# Patient Record
Sex: FEMALE | Race: BLACK OR AFRICAN AMERICAN | NOT HISPANIC OR LATINO | Employment: OTHER | URBAN - METROPOLITAN AREA
[De-identification: names, ages, dates, MRNs, and addresses within clinical notes are randomized per-mention and may not be internally consistent; named-entity substitution may affect disease eponyms.]

---

## 2017-06-12 PROBLEM — M48.10 DISH (DIFFUSE IDIOPATHIC SKELETAL HYPEROSTOSIS): Status: ACTIVE | Noted: 2017-06-12

## 2017-07-09 PROBLEM — I10 HYPERTENSION: Status: ACTIVE | Noted: 2017-07-09

## 2017-07-09 PROBLEM — R16.0 LIVER MASS: Status: ACTIVE | Noted: 2017-07-09

## 2017-07-09 PROBLEM — N20.0 NEPHROLITHIASIS: Status: ACTIVE | Noted: 2017-07-09

## 2017-07-17 PROBLEM — N20.0 NEPHROLITHIASIS: Status: ACTIVE | Noted: 2017-07-17

## 2017-07-17 PROBLEM — R16.0 LIVER MASS: Status: ACTIVE | Noted: 2017-07-17

## 2017-07-25 PROBLEM — N10 ACUTE PYELONEPHRITIS: Status: ACTIVE | Noted: 2017-07-25

## 2017-07-25 PROBLEM — R11.2 NAUSEA AND VOMITING: Status: ACTIVE | Noted: 2017-07-25

## 2017-07-25 PROBLEM — E87.5 HYPERKALEMIA: Status: ACTIVE | Noted: 2017-07-25

## 2017-07-26 PROBLEM — R11.0 NAUSEA: Status: ACTIVE | Noted: 2017-07-26

## 2017-07-26 PROBLEM — C78.7 METASTATIC CANCER TO LIVER: Status: ACTIVE | Noted: 2017-07-26

## 2017-07-27 PROBLEM — R11.2 NAUSEA AND VOMITING: Status: RESOLVED | Noted: 2017-07-25 | Resolved: 2017-07-27

## 2017-07-28 PROBLEM — C78.7 METASTATIC CANCER TO LIVER: Status: ACTIVE | Noted: 2017-07-28

## 2017-07-29 ENCOUNTER — NURSE TRIAGE (OUTPATIENT)
Dept: ADMINISTRATIVE | Facility: CLINIC | Age: 67
End: 2017-07-29

## 2017-07-30 NOTE — TELEPHONE ENCOUNTER
"  Answer Assessment - Initial Assessment Questions  1. APPEARANCE of BLOOD: "What color is it?" "Is it passed separately, on the surface of the stool, or mixed in with the stool?"       Small spot on tissue   2. AMOUNT: "How much blood was passed?"       --  3. FREQUENCY: "How many times has blood been passed with the stools?"       Once   4. ONSET: "When was the blood first seen in the stools?" (Days or weeks)       Today   5. DIARRHEA: "Is there also some diarrhea?" If so, ask: "How many diarrhea stools were passed in past 24 hours?"       No   6. CONSTIPATION: "Do you have constipation?" If so, "How bad is it?"      Firm   7. RECURRENT SYMPTOMS: "Have you had blood in your stools before?" If so, ask: "When was the last time?" and "What happened that time?"       No   8. BLOOD THINNERS: "Do you take any blood thinners?" (e.g., Coumadin/warfarin, Pradaxa/dabigatran, aspirin)      ASA  9. OTHER SYMPTOMS: "Do you have any other symptoms?"  (e.g., abdominal pain, vomiting, dizziness, fever)      No   10. PREGNANCY: "Is there any chance you are pregnant?" "When was your last menstrual period?"        No    Protocols used: ST RECTAL BLEEDING-A-AH    "

## 2017-07-31 ENCOUNTER — PATIENT OUTREACH (OUTPATIENT)
Dept: ADMINISTRATIVE | Facility: CLINIC | Age: 67
End: 2017-07-31

## 2017-07-31 NOTE — PATIENT INSTRUCTIONS
"How to Control Nausea and Vomiting     Taken before meals, medicines can help ease nausea.    Nausea is feeling that you need to throw up. Throwing up occurs when your body forces food that is in your stomach out through your mouth. Nausea and vomiting are symptoms that are caused by many things. They can happen when a condition or disease, medicine, medical treatment, or a poisonous substance affects the area in your brain that controls vomiting. Some conditions or diseases can cause nausea, abdominal pain or cramps, and vomiting. The symptoms can be mild and go away by themselves. Other symptoms can be serious. You will need to see your healthcare provider for these.  Nausea and vomiting are common. They can be caused by many things. These include:  · "Stomach flu" (gastroenteritis)  · Food poisoning  · Stomach pain (gastritis)  · Blockages  They can also be caused by a head injury, an infection in the brain or inside the ear, or migraines. Other common causes of nausea and vomiting include:  · Brain tumor  · Brain bruise  · Motion sickness  · Drugs. These include alcohol, pain medicines such as morphine, and cancer medicines.  · Toxins. These are poisonous things like plants or liquids that are swallowed by accident.  · Advanced types of cancer  · Movement problems (psychogenic problems)  · Extra pressure in the fluid that surrounds the brain and spinal cord (elevated intracranial pressure)     Nausea and vomiting are also common side effects of chemotherapy and radiation therapy. Side effects happen when treatment changes some normal cells as well as cancer cells. In this case, the cells lining your stomach and the part of your brain that controls vomiting are affected. Other more serious causes of vomiting may be hard to find early in the illness.     When to seek medical advice  Call your healthcare provider right away if you have the following:  · Nausea or vomiting that lasts 24 hours or more  · Trouble " keeping fluids down   Medicines can help  Nausea or vomiting can often be prevented or controlled with medicines (antiemetics). Your doctor may give you antiemetics before or after treatment if you are getting chemotherapy or other medical treatments that cause nausea or vomiting.  Eating tips  · If you have medicines to control nausea, take them before meals as directed.  · Avoid fatty or greasy foods while nauseated.  · Eat small meals slowly throughout the day.  · Ask someone to sit with you while you eat to keep you from thinking about feeling nauseated.  · Eat foods at room temperature or colder to avoid strong smells.  · Eat dry foods, such as toast, crackers, or pretzels. Also eat cool, light foods, such as applesauce, and bland foods, such as oatmeal or skinned chicken.   Other ways to feel better  · Get a little fresh air. Take a short walk.  · Talk to a friend, listen to music, or watch TV.  · Take a few deep, slow breaths.  · Eat by candlelight or in surroundings that you find relaxing.  · Use a technique, such as guided imagery, to help you relax. Imagine yourself in a beautiful, restful scene. Or daydream about the place youd most like to be.  Date Last Reviewed: 1/6/2016  © 3927-4328 [employer]. [address] [place] [address], [place] [address]. All rights reserved. This information is not intended as a substitute for professional medical care. Always follow your healthcare professional's instructions.

## 2017-08-07 PROBLEM — C7A.8 NEUROENDOCRINE CANCER: Status: ACTIVE | Noted: 2017-08-07

## 2017-08-29 ENCOUNTER — TELEPHONE (OUTPATIENT)
Dept: NEUROLOGY | Facility: HOSPITAL | Age: 67
End: 2017-08-29

## 2017-08-29 DIAGNOSIS — C7B.02 SECONDARY MALIGNANT CARCINOID TUMOR OF LIVER: ICD-10-CM

## 2017-08-29 DIAGNOSIS — C7A.098 MALIGNANT CARCINOID TUMOR OF PANCREAS: Primary | ICD-10-CM

## 2017-08-29 NOTE — TELEPHONE ENCOUNTER
----- Message from Christine Liu sent at 8/28/2017  2:48 PM CDT -----  Contact: Patients Daughter, Taya  EAW/New patient - Who called: Patients daughter Taya    Referred by: [facility]    Why do you need to be seen? Neuroendocrine- pancreas with mets to the liver.    Which doctor are you requesting?    You may contact patient back to schedule at: Taya, the daughter at [phone]    Instructed patient to gather medical records, Cd's and medication list and fax or mail to us asap.

## 2017-08-29 NOTE — TELEPHONE ENCOUNTER
Ordered octreoscan, MRI, tumor markers per protocol. Appointment made with MD, info sent to patient.  Diagnosed with pancreas and mets to liver intermediate grade already taking Sutent.

## 2017-08-29 NOTE — LETTER
August 29, 2017    Kathy Ames  [address] [place] [address]             [facility]  Tumor Program  [address]  [address]  [place] [address]  Phone: [phone]  Fax: [phone] Dear Ms. Ames:    Thank you for your interest in our program. It was my pleasure speaking with you today about your upcoming appointment.     You have a scheduled appointment with Dr Doyle Dorantes DO on Friday, September 15, 2017 at 2:00 PM. Our office is located at :    [facility]  Medical [facility]  Neuroendocrine Tumor Clinic  [address]  [place], [address]    You are scheduled for a consultation only with the physicians unless otherwise planned. Plan to be here for your visit for 2-3 hrs. If flight arrangements are made, plan to make the return flight after 6 pm if possible. [place] [place] 10 minutes from [facility].    In preparation for your appointment, we ask that you gather the information below and fax them to us ASAP. This will enable us to review all pertinent information at the time of your visit so that recommendations can be made and a plan of care developed for you.     Please return forms along with all paper records via fax asap.    Please fax  1. Insurance cards (front and back)-enlarged if possible  2. Drivers licenses  3. Current medicine list  4. Name, address & phone # of your physician for correspondence  5. Authorization for Release of Information-complete and return to clinic  6. Medical Records-send as soon as you have them together (see guidelines below)    Lab Work: If requested, the special lab markers do require special tubes that have to be ordered by the ordering facility. The lab work should be within 3 months.. The labs take 2-3 weeks to get results so please get labs drawn asap. The name and phone # of the send out lab that does the special labs tests is on the order. You can  have the labs drawn at [employer], a local hospital, or your doctors office (whichever works). If these lab tests need to be done, I will attach an Outpatient Order form. If you are doing your lab work at a facility other than [facility], call our office to notify us the date you have them drawn and the location and phone number of the lab for easy follow up.    Scans: Please mail copies of CD's of your last scans to the office asap. I would recommend sending them overnight with a tracking number in case of any problems. If you need updated scans, I will attach an Outpatient Order form.    Tissue Biopsy/Pathology: If you had a tissue biopsy or surgery, we will request to have your slides and/or tissue blocks sent to us to perform some special testing on them. Please provide us with a pathology report asa. This testing will be billed to your insurance company.     Operative or Procedure reports: All surgery or procedure reports related to your neuroendocrine tumor should be sent to us.    Insurance Company: You should contact your insurance company to inquire about your insurance coverage and benefits. Ask about co-payments and deductibles when seeing a specialist. Ask if this visit will be in Network or Out of Network. We may be able to work with you if this is out of network for you.    Lodging: Attached is lodging information from the [employer] [facility] and a list of local hotels. The [facility] is run by the [employer] and is free of charge. If you would like to stay at the hospitalsge, you must call my office and talk to [facility]. You will need to complete the application and send it to my office for a physician signature. We will forward it to the [facility] and they will check availability. If you wish to stay at the [facility], apply EARLY, they fill up quickly. You may contact the [facility] a week later to confirm your reservation and ask any questions regarding the facility. You  May only stay at the [facility] 24 hrs  prior to your appointment and up to 24 hrs after your appointment. (THIS CAN ONLY BE USED IF YOU LIVE MORE THAN 40 MILES FROM OUR FACILITY).    Call me if you have any questions, email is not the best way to communicate with our office.    We are looking forward to meeting and taking care of you. If you have any questions or concerns, please don't hesitate to call.     Sincerely,        Olivia Hall, RN, BSN  Nurse Manager, Neuroendocrine Tumor Program

## 2017-08-31 ENCOUNTER — TELEPHONE (OUTPATIENT)
Dept: NEUROLOGY | Facility: HOSPITAL | Age: 67
End: 2017-08-31

## 2017-08-31 NOTE — TELEPHONE ENCOUNTER
----- Message from Christine Liu sent at 8/31/2017  8:36 AM CDT -----  Contact: Patients daughterTaya  RR/NEW-Patients Taya vogel called about the patients medicaid insurance expiring today. Taya would like to speak with someone about  financial assistance programs, if any. Taya can be reached at [phone].

## 2017-09-06 ENCOUNTER — TELEPHONE (OUTPATIENT)
Dept: NEUROLOGY | Facility: HOSPITAL | Age: 67
End: 2017-09-06

## 2017-09-06 NOTE — TELEPHONE ENCOUNTER
----- Message from Christine Liu sent at 9/5/2017  3:53 PM CDT -----  Contact: Patient  RR/NEW- Patients daughter Taya called to inform us the patient no longer has insurance and she is trying to apply for financial assistance. Patient will also not qualify for Medicare until November. Taya would like all appointments with the doctor and testing cancelled until she can obtain financial assistance. Taya can be reached at  [phone] for any questions

## 2017-09-06 NOTE — TELEPHONE ENCOUNTER
Cancelled all appts which were not approved.  She is out of medicaid and does not qualify for Medicare until November in which she will reschedule.

## 2017-10-31 ENCOUNTER — TELEPHONE (OUTPATIENT)
Dept: NEUROLOGY | Facility: HOSPITAL | Age: 67
End: 2017-10-31

## 2017-10-31 PROBLEM — Z86.2 HISTORY OF IRON DEFICIENCY ANEMIA: Status: ACTIVE | Noted: 2017-10-31

## 2017-10-31 NOTE — TELEPHONE ENCOUNTER
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  Ref from Dr. Nicholas.  Contact pts sister in law as requested.  Scheduled oscan, tumor markers and [facility]. Marlin scheduled with MD>

## 2017-10-31 NOTE — LETTER
October 31, 2017        Bairon Pizano MD  [address]  [place] [address]             [facility] [address]  [place] [address]  Phone: [phone]  Fax: [phone]   Patient: Lala Ames   MR Number: [id number]   YOB: 1950   Date of Visit: 10/31/2017     Dear Dr. Pizano,     We contacted  regarding setting up an appointment for an evaluation at our center.  We scheduled an octreoscan, blood tumor markers and [facility] over the next couple of weeks.  We also scheduled an appointment with Dr. Doyle Dorantes on 11/21/17  for recommendations.  We will forward you a copy of the clinic note after the visit.      Thank you for considering our program and for referring this patient.  If you have any questions, please do not hesitate to contact us.      Sincerely,      Olivia Hall RN

## 2017-11-06 ENCOUNTER — HOSPITAL ENCOUNTER (OUTPATIENT)
Dept: RADIOLOGY | Facility: HOSPITAL | Age: 67
Discharge: HOME | End: 2017-11-06
Attending: INTERNAL MEDICINE
Payer: MEDICARE

## 2017-11-06 DIAGNOSIS — C7B.02 SECONDARY MALIGNANT CARCINOID TUMOR OF LIVER: ICD-10-CM

## 2017-11-06 DIAGNOSIS — C7A.098 MALIGNANT CARCINOID TUMOR OF PANCREAS: ICD-10-CM

## 2017-11-06 PROCEDURE — 78803 RP LOCLZJ TUM SPECT 1 AREA: CPT | Mod: 26,,, | Performed by: RADIOLOGY

## 2017-11-06 PROCEDURE — 78804 RP LOCLZJ TUM WHBDY 2+D IMG: CPT | Mod: 26,,, | Performed by: RADIOLOGY

## 2017-11-06 PROCEDURE — 78803 RP LOCLZJ TUM SPECT 1 AREA: CPT | Mod: TC

## 2017-11-06 PROCEDURE — 74150 CT ABDOMEN W/O CONTRAST: CPT | Mod: 26,,, | Performed by: RADIOLOGY

## 2017-11-07 ENCOUNTER — HOSPITAL ENCOUNTER (OUTPATIENT)
Dept: RADIOLOGY | Facility: HOSPITAL | Age: 67
Discharge: HOME | End: 2017-11-07
Attending: INTERNAL MEDICINE
Payer: MEDICARE

## 2017-11-07 DIAGNOSIS — C7B.02 SECONDARY MALIGNANT CARCINOID TUMOR OF LIVER: ICD-10-CM

## 2017-11-07 DIAGNOSIS — C7A.098 MALIGNANT CARCINOID TUMOR OF PANCREAS: ICD-10-CM

## 2017-11-07 PROCEDURE — A9572 INDIUM IN-111 PENTETREOTIDE: HCPCS

## 2017-11-07 PROCEDURE — 78803 RP LOCLZJ TUM SPECT 1 AREA: CPT | Mod: 26,,, | Performed by: RADIOLOGY

## 2017-11-07 PROCEDURE — 74150 CT ABDOMEN W/O CONTRAST: CPT | Mod: TC

## 2017-11-07 PROCEDURE — 78803 RP LOCLZJ TUM SPECT 1 AREA: CPT | Mod: TC

## 2017-11-21 ENCOUNTER — OFFICE VISIT (OUTPATIENT)
Dept: NEUROLOGY | Facility: HOSPITAL | Age: 67
End: 2017-11-21
Attending: INTERNAL MEDICINE
Payer: MEDICARE

## 2017-11-21 ENCOUNTER — TELEPHONE (OUTPATIENT)
Dept: NEUROLOGY | Facility: HOSPITAL | Age: 67
End: 2017-11-21

## 2017-11-21 VITALS
BODY MASS INDEX: 33.2 KG/M2 | SYSTOLIC BLOOD PRESSURE: 160 MMHG | TEMPERATURE: 99 F | HEIGHT: 59 IN | WEIGHT: 164.69 LBS | DIASTOLIC BLOOD PRESSURE: 75 MMHG | HEART RATE: 74 BPM

## 2017-11-21 DIAGNOSIS — C7B.8 SECONDARY NEUROENDOCRINE TUMOR OF LIVER: Primary | ICD-10-CM

## 2017-11-21 DIAGNOSIS — C7B.8 SECONDARY NEUROENDOCRINE TUMOR OF LIVER: ICD-10-CM

## 2017-11-21 DIAGNOSIS — C7A.8 NEUROENDOCRINE CARCINOMA, UNKNOWN PRIMARY SITE: Primary | ICD-10-CM

## 2017-11-21 DIAGNOSIS — N18.4 CKD (CHRONIC KIDNEY DISEASE) STAGE 4, GFR 15-29 ML/MIN: ICD-10-CM

## 2017-11-21 DIAGNOSIS — R11.0 NAUSEA: ICD-10-CM

## 2017-11-21 PROCEDURE — 99215 OFFICE O/P EST HI 40 MIN: CPT | Mod: ,,, | Performed by: INTERNAL MEDICINE

## 2017-11-21 PROCEDURE — 99214 OFFICE O/P EST MOD 30 MIN: CPT | Performed by: INTERNAL MEDICINE

## 2017-11-21 RX ORDER — PROMETHAZINE HYDROCHLORIDE 12.5 MG/1
12.5 TABLET ORAL EVERY 6 HOURS PRN
Qty: 60 TABLET | Refills: 0 | Status: SHIPPED | OUTPATIENT
Start: 2017-11-21 | End: 2018-02-07 | Stop reason: SDUPTHER

## 2017-11-21 RX ORDER — PROMETHAZINE HYDROCHLORIDE 12.5 MG/1
12.5 SUPPOSITORY RECTAL EVERY 6 HOURS PRN
Qty: 60 SUPPOSITORY | Refills: 0 | Status: SHIPPED | OUTPATIENT
Start: 2017-11-21

## 2017-11-21 NOTE — PROGRESS NOTES
NOLANETS:  [facility]  A collaboration between [facility] and [facility]    PATIENT: Lala Ames  MRN: [id number]  DATE: 11/21/2017      Diagnosis:   1. Neuroendocrine carcinoma, unknown primary site    2. Secondary neuroendocrine tumor of liver    3. Nausea    4. CKD (chronic kidney disease) stage 4, GFR 15-29 ml/min        Chief Complaint: Consult (unknown primary NET with liver mets/ ref by Dr Nicholas)      Oncologic History:      Oncologic History Neuroendocrine  tumor unknown primary   Metastatic disease to liver diagnosed 7/2017     Oncologic Treatment Sandostatin 8/2017 - 10/2017  Sutent 8/2017 x 8 weeks (discontinue due to side effects)    Pathology Well differentiated neuroendocrine tumor, Ki-67 5%           Subjective:    Interval History: Ms. Ames is a 67 y.o. female who is seen as an initial visit for a neuroendocrine tumor of unknown primary.  Her history dates to 7/2017 when she sought medical attention for abdominal pain she does have a history of chronic kidney disease and a noncontrasted CT of the abdomen was performed as well as an ultrasound which had shown  findings consistent with metastatic disease to the liver.  She underwent a liver biopsy with pathology revealing a well-differentiated neuroendocrine tumor with Ki-67 of 5%.  Subsequent MRI was performed which also did not reveal a primary site but did confirm disease in her liver.  She was initially started on Sandostatin in 8/2017 as well as Sutent.  She took Sutent for approximately 8 weeks but experienced worsening nausea, vomiting, decreased appetite and weight loss and this was discontinued.  At the same time a chromogranin level was measured which had increased since her initial diagnosis.      She is referred for further management of her neuroendocrine tumor.  She states that she has ongoing problems with nausea and  vomiting.  She is lost at least 25 pounds since her initial diagnosis because of this.  She states that she has difficulty swallowing.  She complains of some ongoing intermittent abdominal pain as well.  She is also noted some mild shortness of breath.  Despite this, she states that she has good days and bad days and tries to stay as active as possible.  His no other complaints.          Past Medical History:   Past Medical History:   Diagnosis Date    Anticoagulant long-term use     Arthritis     Blood transfusion     Coronary artery disease     Diabetes mellitus     Diabetes mellitus, type 2     Diabetic retinopathy     Disorder of kidney and ureter     H/O coronary angiogram     Hypertension     Liver cancer     Malignant carcinoid tumor of pancreas     MI (myocardial infarction)     Nausea 11/21/2017    Neuroendocrine carcinoma, 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    PDR (proliferative diabetic retinopathy)     Secondary malignant carcinoid tumor of liver     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       Past Surgical HIstory:   Past Surgical History:   Procedure Laterality Date    ABDOMINAL SURGERY      CARDIAC CATHETERIZATION      COLECTOMY      r/t bowel injury from hysterectomy    COLONOSCOPY      CYST REMOVAL      soft tissue on forehead    EYE SURGERY      HERNIA REPAIR      hiatal hernia    HYSTERECTOMY      LIVER BIOPSY  07/2017       Family History:   Family History   Problem Relation Age of Onset    Mental illness Mother     Diabetes Father     Cancer Father      pancreatic    Diabetes Sister     Stomach cancer Sister     Cancer Sister      gastric    Kidney disease Brother     Diabetes Daughter     No Known Problems Daughter     No Known Problems Son     No Known Problems Son     No Known Problems Brother     No Known Problems Brother     No Known Problems Brother     Cancer Maternal Aunt     Cancer Maternal Aunt        Social History:  reports that  "she has never smoked. She has never used smokeless tobacco. She reports that she does not drink alcohol or use drugs.    Allergies:  Review of patient's allergies indicates:   Allergen Reactions    Epinephrine     Diltiazem Rash and Other (See Comments)     Skin peels    Morphine Rash       Medications:  Current Outpatient Prescriptions   Medication Sig Dispense Refill    amlodipine (NORVASC) 10 MG tablet Take 1 tablet (10 mg total) by mouth once daily. 90 tablet 3    aspirin (ECOTRIN) 81 MG EC tablet Take 1 tablet (81 mg total) by mouth once daily.      blood sugar diagnostic (BLOOD GLUCOSE TEST) Strp 1 strip by Misc.(Non-Drug; Combo Route) route 3 (three) times daily as needed. 300 strip 3    ergocalciferol (ERGOCALCIFEROL) 50,000 unit Cap Take 1 capsule (50,000 Units total) by mouth every 7 days. 12 capsule 0    nitroGLYCERIN (NITROSTAT) 0.4 MG SL tablet Place 1 tablet (0.4 mg total) under the tongue every 5 (five) minutes as needed for Chest pain. 30 tablet 11    pen needle, diabetic (NOVOFINE 30) 30 gauge x 1/3" Ndle 1 Units by Misc.(Non-Drug; Combo Route) route 3 (three) times daily before meals. 400 each 2    insulin aspart (NOVOLOG) 100 unit/mL injection Inject 10 Units into the skin 3 (three) times daily before meals. 36 mL 2    insulin detemir (LEVEMIR FLEXPEN) 100 unit/mL (3 mL) SubQ InPn pen Inject 65 Units into the skin every evening. 80 mL 2    promethazine (PHENERGAN) 12.5 MG Supp Place 1 suppository (12.5 mg total) rectally every 6 (six) hours as needed. 60 suppository 0    promethazine (PHENERGAN) 12.5 MG Tab Take 1 tablet (12.5 mg total) by mouth every 6 (six) hours as needed. 60 tablet 0     No current facility-administered medications for this visit.        Review of Systems   Constitutional: Positive for activity change, appetite change and unexpected weight change. Negative for chills and fever.   HENT: Positive for trouble swallowing. Negative for congestion, hearing loss and " "nosebleeds.    Eyes: Negative for visual disturbance.   Respiratory: Positive for shortness of breath. Negative for cough.    Cardiovascular: Negative for chest pain and palpitations.   Gastrointestinal: Positive for abdominal pain and diarrhea. Negative for blood in stool, constipation, nausea and vomiting.   Genitourinary: Negative for dysuria.   Musculoskeletal: Negative for back pain and gait problem.   Skin: Negative for color change and rash.   Neurological: Negative for dizziness, weakness and headaches.   Hematological: Negative for adenopathy. Does not bruise/bleed easily.   Psychiatric/Behavioral: Negative for confusion.       ECOG Performance Status: 1   Objective:      Vitals:   Vitals:    11/21/17 1306   BP: (!) 160/75   Pulse: 74   Temp: 98.6 °F (37 °C)   TempSrc: Oral   Weight: 74.7 kg (164 lb 10.9 oz)   Height: 4' 11" (1.499 m)     BMI: Body mass index is 33.26 kg/m².    Physical Exam   Constitutional: She is oriented to person, place, and time. She appears well-developed and well-nourished. No distress.   HENT:   Head: Normocephalic.   Mouth/Throat: No oropharyngeal exudate.   Eyes: EOM are normal. No scleral icterus.   Neck: Neck supple. No tracheal deviation present. No thyromegaly present.   Cardiovascular: Normal rate and regular rhythm.    Pulmonary/Chest: Effort normal and breath sounds normal. No respiratory distress. She has no wheezes. She has no rales.   Abdominal: Soft. She exhibits no distension and no mass. There is tenderness. There is no rebound and no guarding.   Musculoskeletal: Normal range of motion. She exhibits no edema.   Lymphadenopathy:     She has no cervical adenopathy.   Neurological: She is alert and oriented to person, place, and time. No cranial nerve deficit.   Skin: Skin is warm and dry.   Psychiatric: She has a normal mood and affect.       Laboratory Data:  Lab Visit on 11/07/2017   Component Date Value Ref Range Status    WBC 11/07/2017 9.92  3.90 - 12.70 K/uL " Final    RBC 11/07/2017 3.99* 4.00 - 5.40 M/uL Final    Hemoglobin 11/07/2017 11.7* 12.0 - 16.0 g/dL Final    Hematocrit 11/07/2017 35.5* 37.0 - 48.5 % Final    MCV 11/07/2017 89  82 - 98 fL Final    MCH 11/07/2017 29.3  27.0 - 31.0 pg Final    MCHC 11/07/2017 33.0  32.0 - 36.0 g/dL Final    RDW 11/07/2017 15.5* 11.5 - 14.5 % Final    Platelets 11/07/2017 245  150 - 350 K/uL Final    MPV 11/07/2017 11.7  9.2 - 12.9 fL Final    Gran # 11/07/2017 7.0  1.8 - 7.7 K/uL Final    Lymph # 11/07/2017 2.0  1.0 - 4.8 K/uL Final    Mono # 11/07/2017 0.7  0.3 - 1.0 K/uL Final    Eos # 11/07/2017 0.1  0.0 - 0.5 K/uL Final    Baso # 11/07/2017 0.02  0.00 - 0.20 K/uL Final    Gran% 11/07/2017 70.9  38.0 - 73.0 % Final    Lymph% 11/07/2017 20.5  18.0 - 48.0 % Final    Mono% 11/07/2017 7.1  4.0 - 15.0 % Final    Eosinophil% 11/07/2017 1.1  0.0 - 8.0 % Final    Basophil% 11/07/2017 0.2  0.0 - 1.9 % Final    Differential Method 11/07/2017 Automated   Final   Lab Visit on 10/31/2017   Component Date Value Ref Range Status    Hemoglobin A1C 10/31/2017 8.9* 4.5 - 6.2 % Final    Comment: According to ADA guidelines, hemoglobin A1C <7.0% represents  optimal control in non-pregnant diabetic patients.  Different  metrics may apply to specific populations.   Standards of Medical Care in Diabetes - 2016.  For the purpose of screening for the presence of diabetes:  <5.7%     Consistent with the absence of diabetes  5.7-6.4%  Consistent with increasing risk for diabetes   (prediabetes)  >or=6.5%  Consistent with diabetes  Currently no consensus exists for use of hemoglobin A1C  for diagnosis of diabetes for children.      Estimated Avg Glucose 10/31/2017 209* 68 - 131 mg/dL Final   Lab Visit on 10/26/2017   Component Date Value Ref Range Status    Glucose 10/26/2017 217* 70 - 110 mg/dL Final    Sodium 10/26/2017 138  136 - 145 mmol/L Final    Potassium 10/26/2017 5.5* 3.5 - 5.1 mmol/L Final    Chloride 10/26/2017 106  95  - 110 mmol/L Final    CO2 10/26/2017 27  23 - 29 mmol/L Final    BUN, Bld 10/26/2017 23  8 - 23 mg/dL Final    Calcium 10/26/2017 9.2  8.7 - 10.5 mg/dL Final    Creatinine 10/26/2017 1.8* 0.5 - 1.4 mg/dL Final    Albumin 10/26/2017 3.7  3.5 - 5.2 g/dL Final    Phosphorus 10/26/2017 3.3  2.7 - 4.5 mg/dL Final    eGFR if  10/26/2017 33.8* >60 mL/min/1.73 m^2 Final    eGFR if non  10/26/2017 29.3* >60 mL/min/1.73 m^2 Final    Comment: Calculation used to obtain the estimated glomerular filtration  rate (eGFR) is the CKD-EPI equation. Since race is unknown   in our information system, the eGFR values for   -American and Non--American patients are given   for each creatinine result.      Anion Gap 10/26/2017 5* 8 - 16 mmol/L Final    WBC 10/26/2017 8.06  3.90 - 12.70 K/uL Final    RBC 10/26/2017 3.75* 4.00 - 5.40 M/uL Final    Hemoglobin 10/26/2017 11.2* 12.0 - 16.0 g/dL Final    Hematocrit 10/26/2017 34.5* 37.0 - 48.5 % Final    MCV 10/26/2017 92  82 - 98 fL Final    MCH 10/26/2017 29.9  27.0 - 31.0 pg Final    MCHC 10/26/2017 32.5  32.0 - 36.0 g/dL Final    RDW 10/26/2017 17.0* 11.5 - 14.5 % Final    Platelets 10/26/2017 222  150 - 350 K/uL Final    MPV 10/26/2017 11.4  9.2 - 12.9 fL Final    Gran # 10/26/2017 5.7  1.8 - 7.7 K/uL Final    Lymph # 10/26/2017 1.7  1.0 - 4.8 K/uL Final    Mono # 10/26/2017 0.6  0.3 - 1.0 K/uL Final    Eos # 10/26/2017 0.1  0.0 - 0.5 K/uL Final    Baso # 10/26/2017 0.02  0.00 - 0.20 K/uL Final    nRBC 10/26/2017 0  0 /100 WBC Final    Gran% 10/26/2017 70.7  38.0 - 73.0 % Final    Lymph% 10/26/2017 21.2  18.0 - 48.0 % Final    Mono% 10/26/2017 6.8  4.0 - 15.0 % Final    Eosinophil% 10/26/2017 1.1  0.0 - 8.0 % Final    Basophil% 10/26/2017 0.2  0.0 - 1.9 % Final    Differential Method 10/26/2017 Automated   Final    PTH, Intact 10/26/2017 397.4* 12.0 - 65.0 pg/mL Final    Protein, Urine Random 10/26/2017 365* 0 -  15 mg/dL Final    Comment: The random urine reference ranges provided were established   for 24 hour urine collections.  No reference ranges exist for  random urine specimens.  Correlate clinically.      Creatinine, Random Ur 10/26/2017 86.2  15.0 - 325.0 mg/dL Final    Comment: The random urine reference ranges provided were established   for 24 hour urine collections.  No reference ranges exist for  random urine specimens.  Correlate clinically.      Prot/Creat Ratio, Ur 10/26/2017 4.23* 0.00 - 0.20 Final    Vit D, 25-Hydroxy 10/26/2017 12* 30 - 96 ng/mL Final    Comment: Vitamin D deficiency.........<10 ng/mL                              Vitamin D insufficiency......10-29 ng/mL       Vitamin D sufficiency........> or equal to 30 ng/mL  Vitamin D toxicity............>100 ng/mL      Cholesterol 10/26/2017 243* 120 - 199 mg/dL Final    Comment: The National Cholesterol Education Program (NCEP) has set the  following guidelines (reference ranges) for Cholesterol:  Optimal.....................<200 mg/dL  Borderline High.............200-239 mg/dL  High........................> or = 240 mg/dL      Triglycerides 10/26/2017 171* 30 - 150 mg/dL Final    Comment: The National Cholesterol Education Program (NCEP) has set the  following guidelines (reference values) for triglycerides:  Normal......................<150 mg/dL  Borderline High.............150-199 mg/dL  High........................200-499 mg/dL      HDL 10/26/2017 58  40 - 75 mg/dL Final    Comment: The National Cholesterol Education Program (NCEP) has set the  following guidelines (reference values) for HDL Cholesterol:  Low...............<40 mg/dL  Optimal...........>60 mg/dL      LDL Cholesterol 10/26/2017 150.8  63.0 - 159.0 mg/dL Final    Comment: The National Cholesterol Education Program (NCEP) has set the  following guidelines (reference values) for LDL Cholesterol:  Optimal.......................<130 mg/dL  Borderline High...............130-159  mg/dL  High..........................160-189 mg/dL  Very High.....................>190 mg/dL      HDL/Chol Ratio 10/26/2017 23.9  20.0 - 50.0 % Final    Total Cholesterol/HDL Ratio 10/26/2017 4.2  2.0 - 5.0 Final    Non-HDL Cholesterol 10/26/2017 185  mg/dL Final    Comment: Risk category and Non-HDL cholesterol goals:  Coronary heart disease (CHD)or equivalent (10-year risk of CHD >20%):  Non-HDL cholesterol goal     <130 mg/dL  Two or more CHD risk factors and 10-year risk of CHD <= 20%:  Non-HDL cholesterol goal     <160 mg/dL  0 to 1 CHD risk factor:  Non-HDL cholesterol goal     <190 mg/dL      Sodium 10/26/2017 138  136 - 145 mmol/L Final    Potassium 10/26/2017 5.5* 3.5 - 5.1 mmol/L Final    Chloride 10/26/2017 106  95 - 110 mmol/L Final    CO2 10/26/2017 27  23 - 29 mmol/L Final    Glucose 10/26/2017 217* 70 - 110 mg/dL Final    BUN, Bld 10/26/2017 23  8 - 23 mg/dL Final    Creatinine 10/26/2017 1.8* 0.5 - 1.4 mg/dL Final    Calcium 10/26/2017 9.2  8.7 - 10.5 mg/dL Final    Total Protein 10/26/2017 6.7  6.0 - 8.4 g/dL Final    Albumin 10/26/2017 3.7  3.5 - 5.2 g/dL Final    Total Bilirubin 10/26/2017 0.8  0.1 - 1.0 mg/dL Final    Comment: For infants and newborns, interpretation of results should be based  on gestational age, weight and in agreement with clinical  observations.  Premature Infant recommended reference ranges:  Up to 24 hours.............<8.0 mg/dL  Up to 48 hours............<12.0 mg/dL  3-5 days..................<15.0 mg/dL  6-29 days.................<15.0 mg/dL      Alkaline Phosphatase 10/26/2017 102  55 - 135 U/L Final    AST 10/26/2017 23  10 - 40 U/L Final    ALT 10/26/2017 14  10 - 44 U/L Final    Anion Gap 10/26/2017 5* 8 - 16 mmol/L Final    eGFR if  10/26/2017 33.8* >60 mL/min/1.73 m^2 Final    eGFR if non  10/26/2017 29.3* >60 mL/min/1.73 m^2 Final    Comment: Calculation used to obtain the estimated glomerular filtration  rate  (eGFR) is the CKD-EPI equation. Since race is unknown   in our information system, the eGFR values for   -American and Non--American patients are given   for each creatinine result.      Chromogranin A 10/30/2017 1659* 0 - 95 ng/mL Final    Comment: INTERPRETIVE INFORMATION:  Chromogranin A  This test is performed using the [facility] OMD-XSCIM-EW kit.   Results obtained with different methods or kits cannot be   used interchangeably.  See Compliance Statement D: Yi De/CS  Performed by [facility],                              [address] [place] [id number] [phone]                    [email or web address], Clarence Wood MD - Lab. Director      Hemoglobin A1C 10/26/2017 8.7* 4.5 - 6.2 % Final    Comment: According to ADA guidelines, hemoglobin A1C <7.0% represents  optimal control in non-pregnant diabetic patients.  Different  metrics may apply to specific populations.   Standards of Medical Care in Diabetes - 2016.  For the purpose of screening for the presence of diabetes:  <5.7%     Consistent with the absence of diabetes  5.7-6.4%  Consistent with increasing risk for diabetes   (prediabetes)  >or=6.5%  Consistent with diabetes  Currently no consensus exists for use of hemoglobin A1C  for diagnosis of diabetes for children.      Estimated Avg Glucose 10/26/2017 203* 68 - 131 mg/dL Final       Supplemental Diagnosis  Supplemental (2):  This case was sent to Lela Chopra MD at [facility] for consultation ([id number]). Her  consultation diagnosis is as follows:  This addendum is issued for the results of ancillary studies.  All immunostains performed and interpreted at [facility].  KI67: Positive, 5%, consistent with intermediate grade tumor. Please correlate with clinical and imaging findings for  primary tumor site.  The stain controls reacted appropriately.  Supplemental (1):  This case was sent to Lela Chopra MD at [facility] for consultation ([id number]).  Her  consultation diagnosis is as follows:  OUTSIDE SLIDE REVIEW  ORIGINAL BIOPSY DATE: 7/19/2017  LIVER MASS (NEEDLE BIOPSY):  -Positive for metastatic well-differentiated neuroendocrine tumor  -No necrosis  -No significant mitotic activity  -Pending Ki-67  -Stains in microscopic section  (Electronically Signed: 2017-07-24 10:18:12 )  Diagnosed by: Dakota Javier II M.D.  FINAL PATHOLOGIC DIAGNOSIS  Liver, biopsy:  -This case is being seen in consultation at [facility]. The consultant's opinion will be reported in  an addendum.      Imaging:   Octreotide scan 11/7/17  OCTREOSCAN    History: 66 year old  female  with a history of carcinoid    Comparison:  MRI and CT from July 2017     Technique:  Following IV administration of 6 mCi of 111In-octreotide, delayed whole body images were acquired at 4 and 24 hours.      SPECT-CT images over the abdomen/thorax were acquired at 24 hours.  .    Findings:  Physiologic activity is present in the liver, spleen, kidneys, bladder, colon, and gallbladder (as well as the thyroid).      No abnormal foci of tracer uptake are seen.   Impression         There is no scintigraphic evidence of somoatostatin receptor positive tumor.   The patient's known hepatic lesions do not show increased uptake of the tracer.              Assessment:       1. Neuroendocrine carcinoma, unknown primary site    2. Secondary neuroendocrine tumor of liver    3. Nausea    4. CKD (chronic kidney disease) stage 4, GFR 15-29 ml/min           Plan:     I've had a long conversation with Mrs. Carpenter and multiple family members today concerning her disease.  She has a intermediate grade neuroendocrine tumor of unknown primary with metastatic disease to the liver.  She has an octreotide scan which did not reveal uptake and therefore would hold off on any further somatostatin analog therapy.  She has been on Sutent in the past and had to discontinue because of side effects and also the fact that her  chromogranin became markedly elevated while on this treatment, however, there has not been any repeat imaging since her initial MRI.  Part of the problem with imaging is her chronic kidney disease where she cannot have contrast.  We did not note a primary tumor on initial imaging.  At this point I will send off gene expression profiling to determine site of origin.  I will also check an MRI of the abdomen and a CT of the chest both without contrast.  Following this I will discuss her case in our multidisciplinary neuroendocrine tumor board and get surgical input as well as input from our interventional radiologist concerning treatment.  I will also send her to see GI given her difficulty swallowing and her persistent nausea. I have written her for Phenergan by mouth and also suppositories as Zofran was not beneficial.  I will plan to see her back in one month.  We're waiting on neuroendocrine specific tumor markers to result.  All questions were answered and she is agreeable with this plan.      Doyle Dorantes DO, FACP  Hematology & Oncology, [facility]  [address], [address]  [place]  [address]  ph. [phone]; [phone]  fax. [phone]

## 2017-11-21 NOTE — TELEPHONE ENCOUNTER
Clinic appt scheduled with pt on Wednesday, November 29, 2017 at 10am.  Pt given clinic address and telephone number.  Pt acknowledged understanding.

## 2017-11-21 NOTE — PATIENT INSTRUCTIONS
Will send your biopsy to [facility]    MRI and CT scan on 11/24    Have an appointment Dr. Murica

## 2017-11-21 NOTE — LETTER
November 21, 2017      Bairon Pizano MD  [address]  [place] [address]           [facility] [address]  [place] [address]  Phone: [phone]  Fax: [phone]          Patient: Lala Ames   MR Number: [id number]   YOB: 1950   Date of Visit: 11/21/2017       Dear Dr. Bairon Pizano:    Thank you for referring Lala Ames to me for evaluation. Attached you will find relevant portions of my assessment and plan of care.    If you have questions, please do not hesitate to call me. I look forward to following Lala Ames along with you.    Sincerely,    Doyle Dorantes, , FAC    Enclosure  CC:  No Recipients    If you would like to receive this communication electronically, please contact [email or web address] or [phone] to request more information on [employer] Link access.    For providers and/or their staff who would like to refer a patient to [facility], please contact us through our one-stop-shop provider referral line, [facility], at [phone].    If you feel you have received this communication in error or would no longer like to receive these types of communications, please e-mail [email or web address]

## 2017-11-21 NOTE — TELEPHONE ENCOUNTER
"----- Message from Bridget Ruffin RN sent at 11/21/2017  3:31 PM CST -----  Dr. Dorantes would like this patient to be seen by Dr. uMrcia for     "see GI given her difficulty swallowing and her persistent nausea"  "

## 2017-11-22 ENCOUNTER — TELEPHONE (OUTPATIENT)
Dept: NEUROLOGY | Facility: HOSPITAL | Age: 67
End: 2017-11-22

## 2017-11-22 NOTE — TELEPHONE ENCOUNTER
----- Message from Magalie Valdez, RT sent at 11/22/2017 10:51 AM CST -----  Pt's GFR is too low to give her contrast for her MRI scan on Fri Nov 25th  Please let me know how to proceed  Thanks  Magalie

## 2017-11-24 ENCOUNTER — HOSPITAL ENCOUNTER (OUTPATIENT)
Dept: RADIOLOGY | Facility: HOSPITAL | Age: 67
Discharge: HOME | End: 2017-11-24
Attending: INTERNAL MEDICINE
Payer: MEDICARE

## 2017-11-24 DIAGNOSIS — C7B.8 SECONDARY NEUROENDOCRINE TUMOR OF LIVER: ICD-10-CM

## 2017-11-24 PROCEDURE — 74181 MRI ABDOMEN W/O CONTRAST: CPT | Mod: TC

## 2017-11-24 PROCEDURE — 71250 CT THORAX DX C-: CPT | Mod: TC

## 2017-11-24 PROCEDURE — 74181 MRI ABDOMEN W/O CONTRAST: CPT | Mod: 26,,, | Performed by: RADIOLOGY

## 2017-11-24 PROCEDURE — 71250 CT THORAX DX C-: CPT | Mod: 26,,, | Performed by: RADIOLOGY

## 2017-11-29 ENCOUNTER — OFFICE VISIT (OUTPATIENT)
Dept: NEUROLOGY | Facility: HOSPITAL | Age: 67
End: 2017-11-29
Attending: INTERNAL MEDICINE
Payer: MEDICARE

## 2017-11-29 ENCOUNTER — TELEPHONE (OUTPATIENT)
Dept: NEUROLOGY | Facility: HOSPITAL | Age: 67
End: 2017-11-29

## 2017-11-29 VITALS
WEIGHT: 162.06 LBS | SYSTOLIC BLOOD PRESSURE: 176 MMHG | BODY MASS INDEX: 32.67 KG/M2 | DIASTOLIC BLOOD PRESSURE: 97 MMHG | HEIGHT: 59 IN | HEART RATE: 77 BPM | RESPIRATION RATE: 20 BRPM | TEMPERATURE: 98 F

## 2017-11-29 DIAGNOSIS — R13.10 DYSPHAGIA, UNSPECIFIED TYPE: Primary | ICD-10-CM

## 2017-11-29 PROCEDURE — 99215 OFFICE O/P EST HI 40 MIN: CPT | Performed by: INTERNAL MEDICINE

## 2017-11-29 RX ORDER — MEGESTROL ACETATE 40 MG/ML
400 SUSPENSION ORAL 2 TIMES DAILY
Qty: 280 ML | Refills: 0 | Status: SHIPPED | OUTPATIENT
Start: 2017-11-29 | End: 2018-02-07

## 2017-11-29 NOTE — PATIENT INSTRUCTIONS
You are scheduled for an EGD on __Thursday, December 14, 2017_______________________________    You should eat light meals the day before the procedure and nothing to eat or drink after midnight the night before your procedure.    You will need to be at the 1st floor admission desk at the hospital on _endoscopy staff will contact with arrival time__________      Pt to contact scheduling at [phone] to scheduled Barium Esophagram

## 2017-11-29 NOTE — TELEPHONE ENCOUNTER
----- Message from Shannan Tang LPN sent at 11/29/2017 12:11 PM CST -----  EGD on 12/14/17.  CPT 57940, [id number].    Thanks

## 2017-11-29 NOTE — PROGRESS NOTES
"[facility]    CC: dysphagia    HPI 67 y.o. 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 She denies any previous history of stroke. She has no complaints of regurgitation or vomiting.     She has been on Sandostatin and Sunitinib since 8/2017, but discontinued Sunitinib for nausea.     Past Medical History:   Hypertension   Type II DM   CKD   CAD   Metastatic NET   Previous hysterectomy complicated by colon injury resulting in partial colectomy     Social History: no DEON abuse   Family History: negative for IBD or CRC     Review of Systems  General ROS: negative for chills, fever; positive for weight loss   Psychological ROS: negative for hallucination; positive for depression   Ophthalmic ROS: negative for blurry vision, photophobia or eye pain  ENT ROS: negative for epistaxis, sore throat or rhinorrhea  Respiratory ROS: no cough, shortness of breath, or wheezing  Cardiovascular ROS: no chest pain or dyspnea on exertion  Gastrointestinal ROS: positive dysphagia and diarrhea   Genito-Urinary ROS: no dysuria, trouble voiding, or hematuria  Musculoskeletal ROS: negative for gait disturbance or muscular weakness  Neurological ROS: no syncope or seizures; no ataxia  Dermatological ROS: negative for pruritis, rash and jaundice    Physical Examination  BP (!) 176/97 (BP Location: Left arm)   Pulse 77   Temp 98.4 °F (36.9 °C) (Oral)   Resp 20   Ht 4' 11" (1.499 m)   Wt 73.5 kg (162 lb 0.6 oz)   LMP 08/19/1998 (Exact Date)   BMI 32.73 kg/m²   General appearance: alert, cooperative, no distress  HENT: Normocephalic, atraumatic, neck symmetrical, no nasal discharge   Eyes: conjunctivae/corneas clear, PERRL, EOM's intact  Lungs: clear to auscultation bilaterally, no dullness to percussion bilaterally  Heart: regular rate and " rhythm without rub; no displacement of the PMI   Abdomen: soft, non-tender; bowel sounds normoactive; no organomegaly  Extremities: extremities symmetric; no clubbing, cyanosis, or edema  Integument: Skin color, texture, turgor normal; no rashes; hair distrubution normal  Neurologic: Alert and oriented X 3, normal strength, normal coordination and gait  Psychiatric: no pressured speech; normal affect; no evidence of impaired cognition     Labs:  Hb 11.7   Plt 245  Ferritin 100    Additional history obtained from family   Previous medical records reviewed       Assessment: 67 y.o. female with metastatic NET complains of a 3 month history of recurrent dysphagia for most liquids and pills associated with anorexia and weight loss. These symptoms are most consistent with a motility disorder including weakness of the skeletal muscle of pharynx or the proximal esophagus as part of generalized weakness overall.     Plan:   Barium swallow study in consultation with ST to evaluate for OP dysphagia as well as evidence of motility disorder   Increase home supplements of Ensure from 1-2 cans per day to 4 cans per day in light of weight loss which is progressive   Trial of megace 400mg bid x 14 days for loss of appetite   EGD on Dec 14th   Note patient is not on her daily PPI due to recent carcinoid diagnosis   Patient of Dr. Jose E Murcia MD   [address], [address]   [place] [address] [phone]

## 2017-12-04 ENCOUNTER — CONFERENCE (OUTPATIENT)
Dept: NEUROLOGY | Facility: HOSPITAL | Age: 67
End: 2017-12-04

## 2017-12-04 DIAGNOSIS — R13.10 DYSPHAGIA, UNSPECIFIED TYPE: Primary | ICD-10-CM

## 2017-12-04 NOTE — TELEPHONE ENCOUNTER
[facility]      NEUROENDOCRINE TUMOR MULTIDISCIPLINARY TUMOR BOARD  _____________________________________________________________________    PRESENTER:   Doyle Dorantes DO    REASON FOR PRESENTATION:  Treatment Plan, Scan Review, Surgical Evaluation and Liver Directed Therapy, no IV contrast due to kidney disease    ATTENDEES:   Surgery:              MD NOHELIA Burnham MD T. Ramcharan, MD  Interventional Radiology - Florencio Madden MD  Pathology - Liss Tavares MD  Oncology - Doyle Dorantes DO, Rafael Neal MD  Gastroenterology - Not present   Nursing  Research    PATIENT STATUS:  Established Patient    TUMOR SITE (Primary & Mets):      PATIENT SUMMARY:  Past Medical History:   Diagnosis Date    Anticoagulant long-term use     Arthritis     Blood transfusion     Coronary artery disease     Diabetes mellitus     Diabetes mellitus, type 2     Diabetic retinopathy     Disorder of kidney and ureter     H/O coronary angiogram     Hypertension     Liver cancer     Malignant carcinoid tumor of pancreas     MI (myocardial infarction)     Nausea 11/21/2017    Neuroendocrine carcinoma, 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    PDR (proliferative diabetic retinopathy)     Secondary malignant carcinoid tumor of liver     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       Past Surgical History:   Procedure Laterality Date    ABDOMINAL SURGERY      CARDIAC CATHETERIZATION      COLECTOMY      r/t bowel injury from hysterectomy    COLONOSCOPY      CYST REMOVAL      soft tissue on forehead    EYE SURGERY      HERNIA REPAIR      hiatal hernia    HYSTERECTOMY      LIVER BIOPSY  07/2017     ________________________________________________________________    DISCUSSION:  Radiology review: O scan was negative. I wonder if Ga68 would be different and worth getting. She as multiple new pulmonary nodules which are too small to characterize or biopsy and  indeterminate. She needs follow up. Her liver disease is worse when compared to MRI 7/2017 with multiple, too many to count, new lesions. If TACE she would need bilobar treatment, staged. Given her burden of disease and aggressiveness (given multiple new lesions), I'd be concerned about liver failure. I presume that given her renal function she's not a candidate for either PRRT or chemo. Not sure she'd be a candidate for surgery either, she's probably going to die from her liver compromise, and don't think resection of larger lesions would be of benefit. Lesions are best seen in DWI sequence. Assuming good ECOG, I'd consider TACE in someone that wants to be super aggressive after a discussion in clinic. Hospice is another consideration, severe side effects on sutent,       BOARD RECOMMENDATIONS:     NO PRRT, MIBG due to kidney failure  Appointment soon with Dr Dorantes to discuss  Could consider dose reduced captem versus afinitor

## 2017-12-05 ENCOUNTER — OFFICE VISIT (OUTPATIENT)
Dept: NEUROLOGY | Facility: HOSPITAL | Age: 67
End: 2017-12-05
Attending: INTERNAL MEDICINE
Payer: MEDICARE

## 2017-12-05 VITALS
HEART RATE: 86 BPM | HEIGHT: 59 IN | TEMPERATURE: 100 F | BODY MASS INDEX: 32.09 KG/M2 | SYSTOLIC BLOOD PRESSURE: 155 MMHG | DIASTOLIC BLOOD PRESSURE: 82 MMHG | WEIGHT: 159.19 LBS

## 2017-12-05 DIAGNOSIS — C78.00 MALIGNANT NEOPLASM METASTATIC TO LUNG, UNSPECIFIED LATERALITY: ICD-10-CM

## 2017-12-05 DIAGNOSIS — C7A.8 NEUROENDOCRINE CARCINOMA, UNKNOWN PRIMARY SITE: Primary | ICD-10-CM

## 2017-12-05 DIAGNOSIS — C7B.8 SECONDARY NEUROENDOCRINE TUMOR OF LIVER: ICD-10-CM

## 2017-12-05 DIAGNOSIS — N18.4 CKD (CHRONIC KIDNEY DISEASE) STAGE 4, GFR 15-29 ML/MIN: ICD-10-CM

## 2017-12-05 PROCEDURE — 99215 OFFICE O/P EST HI 40 MIN: CPT | Mod: ,,, | Performed by: INTERNAL MEDICINE

## 2017-12-05 PROCEDURE — 99214 OFFICE O/P EST MOD 30 MIN: CPT | Performed by: INTERNAL MEDICINE

## 2017-12-05 NOTE — LETTER
December 5, 2017      Bairon Pizano MD  [address]  [place] [address]           [facility] [address]  [place] [address]  Phone: [phone]  Fax: [phone]          Patient: Lala Ames   MR Number: [id number]   YOB: 1950   Date of Visit: 12/5/2017       Dear Dr. Bairon Pizano:    Thank you for referring Lala Ames to me for evaluation. Attached you will find relevant portions of my assessment and plan of care.    If you have questions, please do not hesitate to call me. I look forward to following Lala Ames along with you.    Sincerely,    Doyle Dorantes, , FAC    Enclosure  CC:  No Recipients    If you would like to receive this communication electronically, please contact [email or web address] or [phone] to request more information on [employer] Link access.    For providers and/or their staff who would like to refer a patient to [facility], please contact us through our one-stop-shop provider referral line, [facility], at [phone].    If you feel you have received this communication in error or would no longer like to receive these types of communications, please e-mail [email or web address]

## 2017-12-05 NOTE — PROGRESS NOTES
NOLANETS:  [facility]  A collaboration between [facility] and [facility]    PATIENT: Lala Ames  MRN: [id number]  DATE: 12/5/2017      Diagnosis:   1. Neuroendocrine carcinoma, unknown primary site    2. Secondary neuroendocrine tumor of liver    3. CKD (chronic kidney disease) stage 4, GFR 15-29 ml/min    4. Malignant neoplasm metastatic to lung, unspecified laterality        Chief Complaint: Follow-up (duscyss treatnebt iotuib)      Oncologic History:      Oncologic History Neuroendocrine  tumor unknown primary   Metastatic disease to liver diagnosed 7/2017   Metastatic disease to lung diagnosed 11/2017     Oncologic Treatment Sandostatin 8/2017 - 10/2017  Sutent 8/2017 x 8 weeks (discontinue due to side effects)    Pathology Well differentiated neuroendocrine tumor, Ki-67 5%           Subjective:    Interval History: Ms. Ames is a 67 y.o. female who is seen in follow-up for a neuroendocrine tumor of unknown primary.  She states that she has ongoing difficulty swallowing and planning on having an EGD next week.  She has noted some ongoing abdominal pain and shortness of breath.  No other new complaints.    Her history dates to 7/2017 when she sought medical attention for abdominal pain she does have a history of chronic kidney disease and a noncontrasted CT of the abdomen was performed as well as an ultrasound which had shown  findings consistent with metastatic disease to the liver.  She underwent a liver biopsy with pathology revealing a well-differentiated neuroendocrine tumor with Ki-67 of 5%.  Subsequent MRI was performed which also did not reveal a primary site but did confirm disease in her liver.  She was initially started on Sandostatin in 8/2017 as well as Sutent.  She took Sutent for approximately 8 weeks but experienced worsening nausea, vomiting, decreased appetite and weight loss and  this was discontinued.  At the same time a chromogranin level was measured which had increased since her initial diagnosis.  MRI had revealed substantial disease in the liver and CT of the chest had also revealed the presence of lung metastases.          Past Medical History:   Past Medical History:   Diagnosis Date    Anticoagulant long-term use     Arthritis     Blood transfusion     Coronary artery disease     Diabetes mellitus     Diabetes mellitus, type 2     Diabetic retinopathy     Disorder of kidney and ureter     H/O coronary angiogram     Hypertension     Liver cancer     Lung metastases 12/5/2017    Malignant carcinoid tumor of pancreas     MI (myocardial infarction)     Nausea 11/21/2017    Neuroendocrine carcinoma, 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    PDR (proliferative diabetic retinopathy)     Secondary malignant carcinoid tumor of liver     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       Past Surgical HIstory:   Past Surgical History:   Procedure Laterality Date    ABDOMINAL SURGERY      CARDIAC CATHETERIZATION      COLECTOMY      r/t bowel injury from hysterectomy    COLONOSCOPY      CYST REMOVAL      soft tissue on forehead    EYE SURGERY      HERNIA REPAIR      hiatal hernia    HYSTERECTOMY      LIVER BIOPSY  07/2017       Family History:   Family History   Problem Relation Age of Onset    Mental illness Mother     Diabetes Father     Cancer Father      pancreatic    Diabetes Sister     Stomach cancer Sister     Cancer Sister      gastric    Kidney disease Brother     Diabetes Daughter     No Known Problems Daughter     No Known Problems Son     No Known Problems Son     No Known Problems Brother     No Known Problems Brother     No Known Problems Brother     Cancer Maternal Aunt     Cancer Maternal Aunt        Social History:  reports that she has never smoked. She has never used smokeless tobacco. She reports that she does not drink alcohol or  "use drugs.    Allergies:  Review of patient's allergies indicates:   Allergen Reactions    Epinephrine     Diltiazem Rash and Other (See Comments)     Skin peels    Morphine Rash       Medications:  Current Outpatient Prescriptions   Medication Sig Dispense Refill    amlodipine (NORVASC) 10 MG tablet Take 1 tablet (10 mg total) by mouth once daily. 90 tablet 3    aspirin (ECOTRIN) 81 MG EC tablet Take 1 tablet (81 mg total) by mouth once daily.      blood sugar diagnostic (BLOOD GLUCOSE TEST) Strp 1 strip by Misc.(Non-Drug; Combo Route) route 3 (three) times daily as needed. 300 strip 3    ergocalciferol (ERGOCALCIFEROL) 50,000 unit Cap Take 1 capsule (50,000 Units total) by mouth every 7 days. 12 capsule 0    megestrol (MEGACE) 400 mg/10 mL (40 mg/mL) Susp Take 10 mLs (400 mg total) by mouth 2 (two) times daily. 280 mL 0    nitroGLYCERIN (NITROSTAT) 0.4 MG SL tablet Place 1 tablet (0.4 mg total) under the tongue every 5 (five) minutes as needed for Chest pain. 30 tablet 11    pen needle, diabetic (NOVOFINE 30) 30 gauge x 1/3" Ndle 1 Units by Misc.(Non-Drug; Combo Route) route 3 (three) times daily before meals. 400 each 2    promethazine (PHENERGAN) 12.5 MG Supp Place 1 suppository (12.5 mg total) rectally every 6 (six) hours as needed. 60 suppository 0    promethazine (PHENERGAN) 12.5 MG Tab Take 1 tablet (12.5 mg total) by mouth every 6 (six) hours as needed. 60 tablet 0     No current facility-administered medications for this visit.        Review of Systems   Constitutional: Positive for activity change, appetite change and unexpected weight change. Negative for chills and fever.   HENT: Positive for trouble swallowing. Negative for congestion, hearing loss and nosebleeds.    Eyes: Negative for visual disturbance.   Respiratory: Positive for shortness of breath. Negative for cough.    Cardiovascular: Negative for chest pain and palpitations.   Gastrointestinal: Positive for abdominal pain and " "diarrhea. Negative for blood in stool, constipation, nausea and vomiting.   Genitourinary: Negative for dysuria.   Musculoskeletal: Negative for back pain and gait problem.   Skin: Negative for color change and rash.   Neurological: Negative for dizziness, weakness and headaches.   Hematological: Negative for adenopathy. Does not bruise/bleed easily.   Psychiatric/Behavioral: Negative for confusion.       ECOG Performance Status: 1   Objective:      Vitals:   Vitals:    12/05/17 1439   BP: (!) 155/82   Pulse: 86   Temp: (!) 100.4 °F (38 °C)   TempSrc: Oral   Weight: 72.2 kg (159 lb 2.8 oz)   Height: 4' 11" (1.499 m)     BMI: Body mass index is 32.15 kg/m².    Physical Exam   Constitutional: She is oriented to person, place, and time. She appears well-developed and well-nourished. No distress.   HENT:   Head: Normocephalic.   Mouth/Throat: No oropharyngeal exudate.   Eyes: EOM are normal. No scleral icterus.   Neck: Neck supple. No tracheal deviation present. No thyromegaly present.   Cardiovascular: Normal rate and regular rhythm.    Pulmonary/Chest: Effort normal and breath sounds normal. No respiratory distress. She has no wheezes. She has no rales.   Abdominal: Soft. She exhibits no distension and no mass. There is tenderness. There is no rebound and no guarding.   Musculoskeletal: Normal range of motion. She exhibits no edema.   Lymphadenopathy:     She has no cervical adenopathy.   Neurological: She is alert and oriented to person, place, and time. No cranial nerve deficit.   Skin: Skin is warm and dry.   Psychiatric: She has a normal mood and affect.       Laboratory Data:  Lab Visit on 11/07/2017   Component Date Value Ref Range Status    WBC 11/07/2017 9.92  3.90 - 12.70 K/uL Final    RBC 11/07/2017 3.99* 4.00 - 5.40 M/uL Final    Hemoglobin 11/07/2017 11.7* 12.0 - 16.0 g/dL Final    Hematocrit 11/07/2017 35.5* 37.0 - 48.5 % Final    MCV 11/07/2017 89  82 - 98 fL Final    MCH 11/07/2017 29.3  27.0 - " 31.0 pg Final    MCHC 11/07/2017 33.0  32.0 - 36.0 g/dL Final    RDW 11/07/2017 15.5* 11.5 - 14.5 % Final    Platelets 11/07/2017 245  150 - 350 K/uL Final    MPV 11/07/2017 11.7  9.2 - 12.9 fL Final    Gran # 11/07/2017 7.0  1.8 - 7.7 K/uL Final    Lymph # 11/07/2017 2.0  1.0 - 4.8 K/uL Final    Mono # 11/07/2017 0.7  0.3 - 1.0 K/uL Final    Eos # 11/07/2017 0.1  0.0 - 0.5 K/uL Final    Baso # 11/07/2017 0.02  0.00 - 0.20 K/uL Final    Gran% 11/07/2017 70.9  38.0 - 73.0 % Final    Lymph% 11/07/2017 20.5  18.0 - 48.0 % Final    Mono% 11/07/2017 7.1  4.0 - 15.0 % Final    Eosinophil% 11/07/2017 1.1  0.0 - 8.0 % Final    Basophil% 11/07/2017 0.2  0.0 - 1.9 % Final    Differential Method 11/07/2017 Automated   Final       Supplemental Diagnosis  Supplemental (2):  This case was sent to Lela Chopra MD at [facility] for consultation ([id number]). Her  consultation diagnosis is as follows:  This addendum is issued for the results of ancillary studies.  All immunostains performed and interpreted at [facility].  KI67: Positive, 5%, consistent with intermediate grade tumor. Please correlate with clinical and imaging findings for  primary tumor site.  The stain controls reacted appropriately.  Supplemental (1):  This case was sent to Lela Chopra MD at [facility] for consultation ([id number]). Her  consultation diagnosis is as follows:  OUTSIDE SLIDE REVIEW  ORIGINAL BIOPSY DATE: 7/19/2017  LIVER MASS (NEEDLE BIOPSY):  -Positive for metastatic well-differentiated neuroendocrine tumor  -No necrosis  -No significant mitotic activity  -Pending Ki-67  -Stains in microscopic section  (Electronically Signed: 2017-07-24 10:18:12 )  Diagnosed by: Dakota Javier II M.D.  FINAL PATHOLOGIC DIAGNOSIS  Liver, biopsy:  -This case is being seen in consultation at [facility]. The consultant's opinion will be reported in  an addendum.      Imaging:     MRI 11/24/ 17  Comparison: CT  abdomen pelvis 7/19/17, MRI abdomen 7/10/17    Clinical information: Biopsy of segment VI lesion on 7/13/17 resulting in intermediate grade neuroendocrine tumor, primary site unknown.  Chronic kidney disease.    Technique: Multiplanar multisequence MR images of the abdomen without intravenous contrast.    Findings: Visualized lung bases and heart are unremarkable.  The liver is enlarged measuring approximately 22 cm.  There is no intrahepatic or extrahepatic biliary ductal dilatation.  Portal vein flow void appears patent.  The liver is very heterogeneous and contains multiple areas of signal abnormality, the largest of which is located spanning segments V and VI which measures approximately 7.1 x 13.4 cm and was recently biopsied and shown to be neuroendocrine tumor.  Compared to 7/10/17 this appears to have increased in size particularly in the craniocaudad dimension (see coronal SE1, IM 14).  There are numerous scattered areas of signal abnormality throughout all lobes of the liver mostly measuring 1-2 cm likely also representing metastatic lesions.    The gallbladder shows layering sludge.  The pancreas, adrenal glands, spleen, and visualized bowel show no abnormalities.  Subcentimeter cysts noted in the kidneys.  No hydronephrosis.    Bone marrow signal and vascular structures show no significant abnormalities.   Impression       Multifocal lesions throughout in keeping with metastatic disease in patient with biopsy-proven large intermediate grade neuroendocrine tumor spanning segments V and VI.  This mass has shown interval growth compared to 7/10/17.                  Assessment:       1. Neuroendocrine carcinoma, unknown primary site    2. Secondary neuroendocrine tumor of liver    3. CKD (chronic kidney disease) stage 4, GFR 15-29 ml/min    4. Malignant neoplasm metastatic to lung, unspecified laterality           Plan:     We have discussed her case today in our multidisciplinary neuroendocrine tumor board  and reviewed her images.  She has very advanced disease in the liver and now also in the lung.  She understands that her disease is incurable and that any treatment is directed towards palliation and potential prolongation of life.  Because of her chronic kidney disease radionuclide therapy is not an option.  Additionally because of her very advanced liver disease the use of liver directed therapy may not be the best option as this could send her into liver failure and hasten death.  She may be a candidate for systemic therapy with a close eye on her liver and kidney function and have recommended initiation of Afinitor.  I discussed the rationale, alternatives and potential side effects of treatment with her.  I've given her written information on this.  She is unsure if she wants to proceed with therapy.  I have told her if we do not plan on doing therapy and I would recommend symptomatic treatment alone and would recommend hospice at that point.  I have spoken over the phone with family members concerning this and her family members wanted her to get therapy, however, she is still unsure.  She is going to discuss this further with family members and let us know if she wants to proceed with therapy or symptomatic care alone.  Multiple questions were answered and I will plan to see back for further discussion once she decides on how she wants to proceed.    Doyle Dorantes DO, [facility]  Hematology & Oncology, [facility]  [address], [address]  [place]  [address]  ph. [phone]; [phone]  fax. [phone]    40 minutes were spent in coordination of patient's care, record review and counseling.  More than 50% of the time was face-to-face.

## 2017-12-05 NOTE — PATIENT INSTRUCTIONS
Call me at [phone] to let us know if you want to start Afinitor      Everolimus tablets  What is this medicine?  EVEROLIMUS (marianne DAVID li mus) decreases the activity of your immune system. Afinitor is used to treat certain types of cancer. Zortress is used for kidney and liver transplant rejection prophylaxis.  How should I use this medicine?  Take this medicine by mouth with a full glass of water. Follow the directions on the prescription label. You can take this medicine with or without food, but always take Zortress the same way. Do not cut, crush, or chew this medicine. Do not take with grapefruit juice. Take your medicine at regular intervals. Do not take it more often than directed. Do not stop taking except on your doctor's advice.  Talk to your pediatrician regarding the use of this medicine in children. Special care may be needed.  What side effects may I notice from receiving this medicine?  Side effects that you should report to your doctor or health care professional as soon as possible:  · allergic reactions like skin rash, itching or hives, swelling of the face, lips, or tongue  · breathing problems  · chest pain  · cough  · dark urine  · fever or chills, sore throat  · increased hunger or thirst  · increased urination  · nausea, vomiting  · stomach pain  · swelling of ankles, feet, hands  · trouble passing urine or change in the amount of urine  · unusual bleeding or bruising  · unusually weak or tired  Side effects that usually do not require medical attention (Report these to your doctor or health care professional if they continue or are bothersome.):  · constipation  · diarrhea  · dizziness  · dry mouth or mouth sores  · headache  · nausea, vomiting  What may interact with this medicine?  This medicine may interact with the following medications:  · antiviral medicines for HIV or AIDS  · aprepitant  · carbamazepine  · certain medicines for cholesterol like  simvastatin  · clarithromycin  · cyclosporine  · dexamethasone  · diltiazem  · erythromycin  · fluconazole  · grapefruit juice  · itraconazole  · ketoconazole  · live vaccines  · nefazodone  · nicardipine  · phenobarbital  · phenytoin  · rifabutin  · rifampin  · telithromycin  · verapamil  · voriconazole  What if I miss a dose?  If you miss a dose, take it as soon as you can. If it is almost time for your next dose, take only that dose. Do not take double or extra doses.  Where should I keep my medicine?  Keep out of the reach of children.  Store at room temperature between 15 and 30 degrees C (59 and 86 degrees F). Throw away any unused medicine after the expiration date.  What should I tell my health care provider before I take this medicine?  They need to know if you have any of these conditions:  · diabetes  · heart disease  · high cholesterol  · immune system problems  · infection (especially a virus infection such as chickenpox, cold sores, or herpes)  · kidney disease  · liver disease  · low blood counts, like low white cell, platelet, or red cell counts  · rare hereditary problems of galactose intolerance, the Damon lactase deficiency, or glucose-galactose malabsorption  · an unusual or allergic reaction to everolimus, other medicines, foods, dyes, or preservatives  · pregnant or trying to get pregnant  · breast-feeding  What should I watch for while using this medicine?  This drug may make you feel generally unwell. This is not uncommon, as chemotherapy can affect healthy cells as well as cancer cells. Report any side effects. Continue your course of treatment even though you feel ill unless your doctor tells you to stop. Visit your doctor or health care professional for regular check-ups. You may need regular tests to monitor possible side effects of the drug.  This medicine may affect blood sugar levels. If you have diabetes, check with your doctor or health care professional before you change your diet or  the dose of your diabetic medicine.  Do not become pregnant while taking this medicine or for 8 weeks after stopping it. Women should inform their doctor if they wish to become pregnant or think they might be pregnant. There is a potential for serious side effects to an unborn child. Talk to your health care professional or pharmacist for more information. Do not breast-feed an infant while taking this medicine or for 2 weeks after stopping it.  This medicine has caused ovarian failure in some women. This medicine may interfere with the ability to have a child. You should talk with your doctor or health care professional if you are concerned about your fertility.  This medicine has caused reduced sperm counts in some men. This may interfere with the ability to father a child. You should talk to your doctor or health care professional if you are concerned about your fertility.  Call your doctor or health care professional for advice if you get a fever, chills or sore throat, or other symptoms of a cold or flu. Do not treat yourself. This drug decreases your body's ability to fight infections. Try to avoid being around people who are sick.  This medicine may increase your risk to bruise or bleed. Call your doctor or health care professional if you notice any unusual bleeding.  If you have had a kidney transplant, immediately tell your doctor if your incision site is red, warm, or painful. Also, tell your doctor if your incision site opens up or swells or if contains blood, fluid, or pus.  Keep out of the sun. If you cannot avoid being in the sun, wear protective clothing and use sunscreen. Do not use sun lamps or tanning beds/booths.  NOTE:This sheet is a summary. It may not cover all possible information. If you have questions about this medicine, talk to your doctor, pharmacist, or health care provider. Copyright© 2017 [employer]

## 2017-12-05 NOTE — TELEPHONE ENCOUNTER
Spoke to patient.  Appointment made in 2 weeks, she will try to come today for tumor board recommendations.

## 2017-12-06 ENCOUNTER — TELEPHONE (OUTPATIENT)
Dept: NEUROLOGY | Facility: HOSPITAL | Age: 67
End: 2017-12-06

## 2017-12-06 NOTE — TELEPHONE ENCOUNTER
Spoke to patient she would like to start the Afinitor.  Her daughter asked about something for Kidneys. Will ask Dr. Dorantes.

## 2017-12-06 NOTE — TELEPHONE ENCOUNTER
----- Message from Annabella Gagnon sent at 12/6/2017  8:46 AM CST -----  Contact: Patient  RR- Patient called to speak to nurse regarding medication. Call back number [phone]

## 2017-12-07 DIAGNOSIS — C7B.8 SECONDARY NEUROENDOCRINE TUMOR OF LIVER: ICD-10-CM

## 2017-12-07 DIAGNOSIS — C78.00 MALIGNANT NEOPLASM METASTATIC TO LUNG, UNSPECIFIED LATERALITY: ICD-10-CM

## 2017-12-07 DIAGNOSIS — C7A.8 NEUROENDOCRINE CARCINOMA, UNKNOWN PRIMARY SITE: Primary | ICD-10-CM

## 2017-12-07 RX ORDER — EVEROLIMUS 10 MG/1
10 TABLET ORAL DAILY
Qty: 30 TABLET | Refills: 6 | Status: SHIPPED | OUTPATIENT
Start: 2017-12-07 | End: 2018-05-31 | Stop reason: ALTCHOICE

## 2017-12-07 RX ORDER — DEXAMETHASONE 0.5 MG/5ML
1 SOLUTION ORAL 4 TIMES DAILY
Qty: 500 ML | Refills: 3 | Status: ON HOLD | OUTPATIENT
Start: 2017-12-07 | End: 2017-12-14 | Stop reason: HOSPADM

## 2017-12-08 ENCOUNTER — TELEPHONE (OUTPATIENT)
Dept: PHARMACY | Facility: CLINIC | Age: 67
End: 2017-12-08

## 2017-12-08 NOTE — TELEPHONE ENCOUNTER
DOCUMENTATION ONLY   PA was submitted to the insurance  12/11/2017  PA approved until 12/10/2018  Copay 8.25

## 2017-12-11 ENCOUNTER — TELEPHONE (OUTPATIENT)
Dept: PHARMACY | Facility: CLINIC | Age: 67
End: 2017-12-11

## 2017-12-11 ENCOUNTER — TELEPHONE (OUTPATIENT)
Dept: NEUROLOGY | Facility: HOSPITAL | Age: 67
End: 2017-12-11

## 2017-12-11 NOTE — TELEPHONE ENCOUNTER
----- Message from Yeni Mercer sent at 12/11/2017 12:06 PM CST -----  Contact: Patient  GI:  Patient is confused as to why she is still having testing done, is it necessary?  She is concerned about the amount she may have to pay.  Patient can be reached at [phone].  Thanks  abd

## 2017-12-11 NOTE — TELEPHONE ENCOUNTER
Spoke to patient and let her know that the medication would be $8.25.  She would like someone from the pharmacy to call her to discuss ways for her to get the medication. Message sent to the pharmacy.

## 2017-12-11 NOTE — TELEPHONE ENCOUNTER
----- Message from Annabella Gagnon sent at 12/11/2017 10:59 AM CST -----  Contact: patient  RR- Patient called to let us know the medication has been approved thru her insurance. Patient would like toknow how much is the prescription. Call back number [phone]

## 2017-12-11 NOTE — TELEPHONE ENCOUNTER
Initial Afinitor consult completed on *** . Medications will be shipped on *** to arrive at patient's home on *** via [employer]. $*** copay. Patient plans to start Afinitor ***. Address confirmed. Confirmed 2 patient identifiers - name and . Therapy Appropriate.     Patient was counseled on the administration directions for Afinitor:  - Take 1 tablet (10mg) by mouth once daily with or without food, at the same time of the day.    - Do not chew, crush, or break the tablets.   - If possible, patient was instructed tip the tablets from the RX bottle to the cap, and take directly from the cap to the mouth.  Patient may handle the medication with their hands if they wear with a latex or nitrile glove and wash their hands before and after handling the tablets.    Patient was counseled on the following possible side effects, which include, but are not limited to: Hypertension, nausea, vomiting, diarrhea, decreased appetite, skin rash, hand foot syndrome, swelling, fatigue, headache, hyperglycemia, mouth sores, increased risk for infection and bleeding. Patient given hydrocortisone cream for dermatitis and Eucerin for hand-foot syndrome.     DDIs: Medication list reviewed, ***    Patient was given 2 patient education handouts on how to handle oral chemotherapy and specific recommendations- do's and don'ts. Instructed the patient that if they have any remaining oral chemotherapy, not to flush down the toilet or throw away in the trash; the patient or caregiver should return the unused oral chemotherapy to either the clinic or to myself in the Pharmacy where the oral chemotherapy can be disposed of properly.     Patient confirmed understanding. Patient did not have additional questions.  Patient plans to start Afinitor on ***. Consultation included: indication; goals of treatment; administration; storage and handling; side effects; how to handle side effects; the importance of compliance; how to handle missed doses; the  importance of laboratory monitoring; the importance of keeping all follow up appointments.  Patient understands to report any medication changes to [facility] and provider. All questions answered and addressed to patients satisfaction. I will f/u with patient in 1 week from start, [facility] to contact patient in 3 weeks for refills.

## 2017-12-12 ENCOUNTER — HOSPITAL ENCOUNTER (OUTPATIENT)
Dept: RADIOLOGY | Facility: HOSPITAL | Age: 67
Discharge: HOME | End: 2017-12-12
Attending: INTERNAL MEDICINE
Payer: MEDICARE

## 2017-12-12 ENCOUNTER — CLINICAL SUPPORT (OUTPATIENT)
Dept: SPEECH THERAPY | Facility: HOSPITAL | Age: 67
End: 2017-12-12
Attending: INTERNAL MEDICINE
Payer: MEDICARE

## 2017-12-12 DIAGNOSIS — R13.10 DYSPHAGIA, UNSPECIFIED TYPE: ICD-10-CM

## 2017-12-12 PROCEDURE — G8996 SWALLOW CURRENT STATUS: HCPCS | Mod: CH

## 2017-12-12 PROCEDURE — G8997 SWALLOW GOAL STATUS: HCPCS | Mod: CH

## 2017-12-12 PROCEDURE — 74230 X-RAY XM SWLNG FUNCJ C+: CPT | Mod: 26,,, | Performed by: RADIOLOGY

## 2017-12-12 PROCEDURE — 92611 MOTION FLUOROSCOPY/SWALLOW: CPT

## 2017-12-12 PROCEDURE — 74220 X-RAY XM ESOPHAGUS 1CNTRST: CPT | Mod: TC

## 2017-12-12 PROCEDURE — 74230 X-RAY XM SWLNG FUNCJ C+: CPT | Mod: TC

## 2017-12-12 PROCEDURE — G8998 SWALLOW D/C STATUS: HCPCS | Mod: CH

## 2017-12-12 NOTE — PROGRESS NOTES
Modified Barium Swallow    Patient Name:  Lala Ames   MRN:  [id number]      Recommendations:           Diet recommendations:    regular diet and thin liquids     Aspiration Precautions: 1 bite/sip at a time, Alternating bites/sips, HOB to 90 degrees, Meds whole 1 at a time, Remain upright 30 minutes post meal, Small bites/sips and Standard aspiration precautions  Pt may continue with regular diet and thin liquids, boost or liquid supplements for increased caloric support  General Precautions: Standard,    Communication strategies:  none    Referral     Reason for Referral  Patient was referred for a Modified Barium Swallow Study to assess the efficiency of his/her swallow function, rule out aspiration and make recommendations regarding safe dietary consistencies, effective compensatory strategies, and safe eating environment.     Diagnosis: dysphagia, r/o aspiration  Interval History: Ms. Ames is a 67 y.o. female who is seen in follow-up for a neuroendocrine tumor of unknown primary.  She states that she has ongoing difficulty swallowing and planning on having an EGD next week.  She has noted some ongoing abdominal pain and shortness of breath.  No other new complaints.     Her history dates to 7/2017 when she sought medical attention for abdominal pain she does have a history of chronic kidney disease and a noncontrasted CT of the abdomen was performed as well as an ultrasound which had shown  findings consistent with metastatic disease to the liver.  She underwent a liver biopsy with pathology revealing a well-differentiated neuroendocrine tumor with Ki-67 of 5%.  Subsequent MRI was performed which also did not reveal a primary site but did confirm disease in her liver.  She was initially started on Sandostatin in 8/2017 as well as Sutent.  She took Sutent for approximately 8 weeks but experienced worsening nausea, vomiting, decreased appetite and weight loss and this was discontinued.  At the  "same time a chromogranin level was measured which had increased since her initial diagnosis.  MRI had revealed substantial disease in the liver and CT of the chest had also revealed the presence of lung metastases.       History:     Past Medical History:   Diagnosis Date    Anticoagulant long-term use     Arthritis     Blood transfusion     Coronary artery disease     Diabetes mellitus     Diabetes mellitus, type 2     Diabetic retinopathy     Disorder of kidney and ureter     H/O coronary angiogram     Hypertension     Liver cancer     Lung metastases 12/5/2017    Malignant carcinoid tumor of pancreas     MI (myocardial infarction)     Nausea 11/21/2017    Neuroendocrine carcinoma, 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    PDR (proliferative diabetic retinopathy)     Secondary malignant carcinoid tumor of liver     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       Objective:     Current Respiratory Status:      Alert/communicative  Pt is oriented x4, able to follow directions and answer questions.      Cooperative: yes    Follows Directions: yes    Visualization  · Patient was seen in the lateral view    Oral Peripheral Examination  · No facial droop, symmetrical smile, dentition in place, functional lingual and labial movement noted.     Consistencies Administered:  5cc, 10cc, 20cc, and self regulated sips of thin liquid barium via cup, straw; tsp bites of barium pudding; tsp bites of diced peaches (drained) coated in barium powder; 1/2"  lalo cracker with barium paste frosting, and 1 barium tablet.  MBSS completed in lateral view.     Oral Preparation/Oral Phase  · WFL- Pt with adequate bolus acceptance, containment, control and timely A-P transfer across consistencies     Pharyngeal Phase  Pt demonstrated timely pharyngeal trigger of the swallow, adequate base of tongue retraction noted, timely laryngeal lift/excursion, adequate glottic closure noted and good sensation of material in " pharynx when swallow triggered.   No oral or pharyngeal residuals present during or post swallow. No evidence of aspiration or penetration noted with any consistencies.   Pt with throat clear noted post swallow and fluoro was turned on but no evidence of stasis noted.      Cervical Esophageal Phase  No evidence of backflow from upper cervical esophagus into the pharynx.   Esophagram completed by Radiologist, see report for those details.       Impressions  Pt demonstrated adequate/timely oral and pharyngeal phase of the swallow, no evidence of aspiration or penetration noted with any consistencies.      Prognosis/Plan/Education  Fair at this time. Pt educated on results of MBS and swallow precautions. She verbalized understanding of swallow precautions.     Swallow Precautions:  1. UPRIGHT for all meals and for all PO solid intake   2. Regular diet Tray/ THIN Liquids  3. Cup swallows, single straw sips   4. Alternate sips/bites  5. Eat slowly, try 5-6 small meals during the day  6. Whole meds with water, one at a time  7. Recommend boost or liquid supplements for increased caloric support    Plan:    1. SLP to send report via [employer] system to MD  2. No further ST needs.          Time Tracking:   SLP Treatment Date:  12/12/2017      Speech Start Time:    11:05  Speech Stop Time:      11:22  Speech Total Time (min):    17 minutes       VERONICA Collado, KATALINA-SLP  12/12/2017

## 2017-12-14 ENCOUNTER — SURGERY (OUTPATIENT)
Age: 67
End: 2017-12-14

## 2017-12-14 ENCOUNTER — ANESTHESIA EVENT (OUTPATIENT)
Dept: ENDOSCOPY | Facility: HOSPITAL | Age: 67
End: 2017-12-14
Payer: MEDICARE

## 2017-12-14 ENCOUNTER — HOSPITAL ENCOUNTER (OUTPATIENT)
Facility: HOSPITAL | Age: 67
Discharge: HOME | End: 2017-12-14
Attending: INTERNAL MEDICINE | Admitting: INTERNAL MEDICINE
Payer: MEDICARE

## 2017-12-14 ENCOUNTER — ANESTHESIA (OUTPATIENT)
Dept: ENDOSCOPY | Facility: HOSPITAL | Age: 67
End: 2017-12-14
Payer: MEDICARE

## 2017-12-14 DIAGNOSIS — K12.1 STOMATITIS: Primary | ICD-10-CM

## 2017-12-14 DIAGNOSIS — R13.10 DYSPHAGIA, UNSPECIFIED TYPE: Primary | ICD-10-CM

## 2017-12-14 DIAGNOSIS — K29.70 GASTRITIS, PRESENCE OF BLEEDING UNSPECIFIED, UNSPECIFIED CHRONICITY, UNSPECIFIED GASTRITIS TYPE: ICD-10-CM

## 2017-12-14 DIAGNOSIS — R13.10 DYSPHAGIA: ICD-10-CM

## 2017-12-14 LAB
GLUCOSE SERPL-MCNC: 153 MG/DL (ref 70–110)
POCT GLUCOSE: 156 MG/DL (ref 70–110)

## 2017-12-14 PROCEDURE — 88305 TISSUE EXAM BY PATHOLOGIST: CPT | Mod: 26,,, | Performed by: PATHOLOGY

## 2017-12-14 PROCEDURE — 25000003 PHARM REV CODE 250: Performed by: INTERNAL MEDICINE

## 2017-12-14 PROCEDURE — 27201012 HC FORCEPS, HOT/COLD, DISP: Performed by: INTERNAL MEDICINE

## 2017-12-14 PROCEDURE — 37000008 HC ANESTHESIA 1ST 15 MINUTES: Performed by: INTERNAL MEDICINE

## 2017-12-14 PROCEDURE — 43239 EGD BIOPSY SINGLE/MULTIPLE: CPT | Performed by: INTERNAL MEDICINE

## 2017-12-14 PROCEDURE — 37000009 HC ANESTHESIA EA ADD 15 MINS: Performed by: INTERNAL MEDICINE

## 2017-12-14 PROCEDURE — 88305 TISSUE EXAM BY PATHOLOGIST: CPT | Performed by: PATHOLOGY

## 2017-12-14 PROCEDURE — 63600175 PHARM REV CODE 636 W HCPCS: Performed by: INTERNAL MEDICINE

## 2017-12-14 PROCEDURE — 63600175 PHARM REV CODE 636 W HCPCS: Performed by: NURSE ANESTHETIST, CERTIFIED REGISTERED

## 2017-12-14 RX ORDER — DEXAMETHASONE 0.5 MG/5ML
1 SOLUTION ORAL 4 TIMES DAILY
Qty: 500 ML | Refills: 3 | Status: SHIPPED | OUTPATIENT
Start: 2017-12-14 | End: 2017-12-24

## 2017-12-14 RX ORDER — PROPOFOL 10 MG/ML
VIAL (ML) INTRAVENOUS CONTINUOUS PRN
Status: DISCONTINUED | OUTPATIENT
Start: 2017-12-14 | End: 2017-12-14

## 2017-12-14 RX ORDER — PROPOFOL 10 MG/ML
VIAL (ML) INTRAVENOUS
Status: DISCONTINUED | OUTPATIENT
Start: 2017-12-14 | End: 2017-12-14

## 2017-12-14 RX ORDER — FENTANYL CITRATE 50 UG/ML
INJECTION, SOLUTION INTRAMUSCULAR; INTRAVENOUS
Status: DISCONTINUED | OUTPATIENT
Start: 2017-12-14 | End: 2017-12-14

## 2017-12-14 RX ORDER — ONDANSETRON 2 MG/ML
8 INJECTION INTRAMUSCULAR; INTRAVENOUS ONCE
Status: COMPLETED | OUTPATIENT
Start: 2017-12-14 | End: 2017-12-14

## 2017-12-14 RX ORDER — SODIUM CHLORIDE 9 MG/ML
INJECTION, SOLUTION INTRAVENOUS CONTINUOUS
Status: DISCONTINUED | OUTPATIENT
Start: 2017-12-14 | End: 2017-12-14 | Stop reason: HOSPADM

## 2017-12-14 RX ORDER — LIDOCAINE HCL/PF 100 MG/5ML
SYRINGE (ML) INTRAVENOUS
Status: DISCONTINUED | OUTPATIENT
Start: 2017-12-14 | End: 2017-12-14

## 2017-12-14 RX ADMIN — OCTREOTIDE ACETATE 500 MCG/HR: 500 INJECTION, SOLUTION INTRAVENOUS; SUBCUTANEOUS at 07:12

## 2017-12-14 RX ADMIN — PROPOFOL 100 MCG/KG/MIN: 10 INJECTION, EMULSION INTRAVENOUS at 09:12

## 2017-12-14 RX ADMIN — PROPOFOL 20 MG: 10 INJECTION, EMULSION INTRAVENOUS at 09:12

## 2017-12-14 RX ADMIN — PROPOFOL 40 MG: 10 INJECTION, EMULSION INTRAVENOUS at 09:12

## 2017-12-14 RX ADMIN — FENTANYL CITRATE 100 MCG: 50 INJECTION, SOLUTION INTRAMUSCULAR; INTRAVENOUS at 09:12

## 2017-12-14 RX ADMIN — SODIUM CHLORIDE: 0.9 INJECTION, SOLUTION INTRAVENOUS at 07:12

## 2017-12-14 RX ADMIN — LIDOCAINE HYDROCHLORIDE 75 MG: 20 INJECTION, SOLUTION INTRAVENOUS at 09:12

## 2017-12-14 RX ADMIN — ONDANSETRON 8 MG: 2 INJECTION INTRAMUSCULAR; INTRAVENOUS at 07:12

## 2017-12-14 NOTE — H&P
"[facility]     CC: dysphagia     HPI 67 y.o. 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 She denies any previous history of stroke. She has no complaints of regurgitation or vomiting.      She has been on Sandostatin and Sunitinib since 8/2017, but discontinued Sunitinib for nausea.      Past Medical History:   Hypertension   Type II DM   CKD   CAD   Metastatic NET   Previous hysterectomy complicated by colon injury resulting in partial colectomy      Social History: no DEON abuse   Family History: negative for IBD or CRC      Review of Systems  General ROS: negative for chills, fever; positive for weight loss   Psychological ROS: negative for hallucination; positive for depression   Ophthalmic ROS: negative for blurry vision, photophobia or eye pain  ENT ROS: negative for epistaxis, sore throat or rhinorrhea  Respiratory ROS: no cough, shortness of breath, or wheezing  Cardiovascular ROS: no chest pain or dyspnea on exertion  Gastrointestinal ROS: positive dysphagia and diarrhea   Genito-Urinary ROS: no dysuria, trouble voiding, or hematuria  Musculoskeletal ROS: negative for gait disturbance or muscular weakness  Neurological ROS: no syncope or seizures; no ataxia  Dermatological ROS: negative for pruritis, rash and jaundice     Physical Examination  BP (!) 176/97 (BP Location: Left arm)   Pulse 77   Temp 98.4 °F (36.9 °C) (Oral)   Resp 20   Ht 4' 11" (1.499 m)   Wt 73.5 kg (162 lb 0.6 oz)   LMP 08/19/1998 (Exact Date)   BMI 32.73 kg/m²   General appearance: alert, cooperative, no distress  HENT: Normocephalic, atraumatic, neck symmetrical, no nasal discharge   Eyes: conjunctivae/corneas clear, PERRL, EOM's intact  Lungs: clear to auscultation bilaterally, no dullness to percussion bilaterally  Heart: regular rate " and rhythm without rub; no displacement of the PMI   Abdomen: soft, non-tender; bowel sounds normoactive; no organomegaly  Extremities: extremities symmetric; no clubbing, cyanosis, or edema  Integument: Skin color, texture, turgor normal; no rashes; hair distrubution normal  Neurologic: Alert and oriented X 3, normal strength, normal coordination and gait  Psychiatric: no pressured speech; normal affect; no evidence of impaired cognition      Labs:  Hb 11.7   Plt 245  Ferritin 100     Additional history obtained from family   Previous medical records reviewed         Assessment: 67 y.o. female with metastatic NET complains of a 3 month history of recurrent dysphagia for most liquids and pills associated with anorexia and weight loss. These symptoms are most consistent with a motility disorder including weakness of the skeletal muscle of pharynx or the proximal esophagus as part of generalized weakness overall.      Plan:   Barium swallow study in consultation with ST showed mild esophageal dysmotility with tertiary contractions and stasis of contrast in the thoracic esophagus.    Trial of megace 400mg bid x 14 days for loss of appetite     EGD today    Note patient is not on her daily PPI due to recent carcinoid diagnosis     Patient of Dr. Jose E Murcia MD   [address], [address]   [place] [address] [phone]

## 2017-12-14 NOTE — ANESTHESIA POSTPROCEDURE EVALUATION
"Anesthesia Post Evaluation    Patient: Lala Ames    Procedure(s) Performed: Procedure(s) (LRB):  EGD. Needs sandostatin (N/A)    Final Anesthesia Type: MAC  Patient location during evaluation: GI PACU  Patient participation: Yes- Able to Participate  Level of consciousness: awake and alert  Post-procedure vital signs: reviewed and stable  Pain management: adequate  Airway patency: patent  PONV status at discharge: No PONV  Anesthetic complications: no      Cardiovascular status: blood pressure returned to baseline and hemodynamically stable  Respiratory status: unassisted, spontaneous ventilation and room air  Hydration status: euvolemic  Follow-up not needed.        Visit Vitals  BP (!) 112/52   Pulse 75   Temp 37.2 °C (99 °F) (Oral)   Resp 12   Ht 4' 11" (1.499 m)   Wt 71.7 kg (158 lb)   LMP 08/19/1998 (Exact Date)   SpO2 99%   Breastfeeding? No   BMI 31.91 kg/m²       Pain/Del Score: Pain Assessment Performed: Yes (12/14/2017  7:07 AM)  Presence of Pain: complains of pain/discomfort (12/14/2017  7:07 AM)      "

## 2017-12-14 NOTE — OR NURSING
Pt tolerating arleth gtt, no complaints, warm blankets applied and patient positioned for comfort

## 2017-12-14 NOTE — TELEPHONE ENCOUNTER
Initial Afinitor consult completed on  . Medications will be shipped on 17 to arrive at patient's home on 17 via [employer]. $8.25 copay. Patient plans to start Afinitor 17. Address confirmed. Confirmed 2 patient identifiers - name and . Therapy Appropriate.     Patient was counseled on the administration directions for Afinitor:  - Take 1 tablet (10mg) by mouth once daily with or without food, at the same time of the day.    - Do not chew, crush, or break the tablets. If possible, patient was instructed tip the tablets from the RX bottle to the cap, and take directly from the cap to the mouth.  Patient may handle the medication with their hands if they wear with a latex or nitrile glove and wash their hands before and after handling the tablets.    Patient was counseled on the following possible side effects, which include, but are not limited to: Hypertension (patient has a way to check BP at home and will do so daily), nausea/vomiting (discussed use of anti-nausea meds, eating smaller meals more frequently and use of ginger ale, etc), diarrhea, decreased appetite (discussed importance of eating even when patient may not feel like it and discussed meal replacement shakes if food not appealing), skin rash, hand foot syndrome (discussed how to manage and to inform MD if any blistering or peeling skin), swelling, fatigue, headache, hyperglycemia, mouth sores (discussed use of dexamethasone mouthwash), increased risk for infection and bleeding. Patient given hydrocortisone cream and Eucerin for dermatitis.     DDIs: Medication list reviewed, increased serum concentration of amlodipine when given with Afinitor - patient to monitor BP daily.  Norvasc may enhance the adverse effects of Afinitor - provided patient with a thorough side effect profile.      Patient was given 2 patient education handouts on how to handle oral chemotherapy and specific recommendations- do's and don'ts. Instructed the  patient that if they have any remaining oral chemotherapy, not to flush down the toilet or throw away in the trash; the patient or caregiver should return the unused oral chemotherapy to either the clinic or to myself in the Pharmacy where the oral chemotherapy can be disposed of properly.     Patient confirmed understanding. Patient did not have additional questions.  Patient plans to start Afinitor on 12/19/17. Consultation included: indication; goals of treatment; administration; storage and handling; side effects; how to handle side effects; the importance of compliance; how to handle missed doses; the importance of laboratory monitoring; the importance of keeping all follow up appointments.  Patient understands to report any medication changes to [facility] and provider. All questions answered and addressed to patients satisfaction. I will f/u with patient in 1 week from start, [facility] to contact patient in 3 weeks for refills.

## 2017-12-14 NOTE — TRANSFER OF CARE
"Anesthesia Transfer of Care Note    Patient: Lala Ames    Procedure(s) Performed: Procedure(s) (LRB):  EGD. Needs sandostatin (N/A)    Patient location: GI    Anesthesia Type: MAC    Transport from OR: Transported from OR on room air with adequate spontaneous ventilation    Post pain: adequate analgesia    Post assessment: no apparent anesthetic complications    Post vital signs: stable    Level of consciousness: awake    Nausea/Vomiting: no nausea/vomiting    Complications: none    Transfer of care protocol was followed      Last vitals:   Visit Vitals  BP (!) 194/86   Pulse 84   Temp 37.2 °C (99 °F) (Oral)   Resp 16   Ht 4' 11" (1.499 m)   Wt 71.7 kg (158 lb)   LMP 08/19/1998 (Exact Date)   SpO2 100%   Breastfeeding? No   BMI 31.91 kg/m²     "

## 2017-12-14 NOTE — ANESTHESIA PREPROCEDURE EVALUATION
12/14/2017  Lala Ames is a 67 y.o., female.    Pre-op Assessment    I have reviewed the Patient Summary Reports.     I have reviewed the Nursing Notes.   I have reviewed the Medications.     Review of Systems  Anesthesia Hx:  No problems with previous Anesthesia  History of prior surgery of interest to airway management or planning: Previous anesthesia: General 2011 with general anesthesia.  Denies Family Hx of Anesthesia complications.   Denies Personal Hx of Anesthesia complications.   Social:  Non-Smoker, Alcohol Use    Hematology/Oncology:  Hematology Normal   Oncology Normal   Hematology Comments: Sickle Cell Trait    EENT/Dental:   Cataract to left eye, tx with Laser and injections   Cardiovascular:   Exercise tolerance: good Hypertension, poorly controlled Past MI (last 2013, has not needed NTG) CAD   ECG has been reviewed.   ECG 9/20/17  Normal sinus rhythm  Leftward axis  Nonspecific T wave abnormality  When compared with ECG of 25-JUL-2017 10:32,  T wave inversion more evident in Lateral leads    TTE 7/25/17  CONCLUSIONS     1 - Normal left ventricular systolic function (EF 55-60%).     2 - Normal right ventricular systolic function .     3 - Normal left ventricular diastolic function.    Pulmonary:  Pulmonary Normal    Renal/:   Chronic Renal Disease, CRI    Hepatic/GI:   Liver Disease, (carcinoid)    Neurological:   Neuropathy in feet  Peripheral Neuropathy    Endocrine:   Diabetes, poorly controlled, type 2        Physical Exam  General:  Obesity    Airway/Jaw/Neck:  Airway Findings: Mouth Opening: Normal Tongue: Normal  General Airway Assessment: Adult  Mallampati: II  TM Distance: 4 - 6 cm  Jaw/Neck Findings:  Neck ROM: Normal ROM      Dental:  Dental Findings: In tact   Chest/Lungs:  Chest/Lungs Findings: Normal Respiratory Rate, Clear to auscultation      Heart/Vascular:  Heart Findings: Rate: Normal  Rhythm: Regular Rhythm     Abdomen:  Abdomen Findings:  Normal, Soft, Nontender       Mental Status:  Mental Status Findings:  Cooperative, Alert and Oriented         Anesthesia Plan  Type of Anesthesia, risks & benefits discussed:  Anesthesia Type:  MAC  Patient's Preference:   Intra-op Monitoring Plan: standard ASA monitors  Intra-op Monitoring Plan Comments:   Post Op Pain Control Plan: multimodal analgesia  Post Op Pain Control Plan Comments:   Induction:   IV  Beta Blocker:  Patient is not currently on a Beta-Blocker (No further documentation required).       Informed Consent: Patient understands risks and agrees with Anesthesia plan.  Questions answered. Anesthesia consent signed with patient.  ASA Score: 3     Day of Surgery Review of History & Physical: I have interviewed and examined the patient. I have reviewed the patient's H&P dated: 5-25-15. There are no significant changes.          Ready For Surgery From Anesthesia Perspective.

## 2017-12-14 NOTE — DISCHARGE INSTRUCTIONS
Post EGD Discharge Instruction    Lala Ames  12/14/2017  Tadeo Murcia MD    RESTRICTIONS ON ACTIVITY:    -DO NOT drive a car, operate machinery or make critical decisions, or do activities that require coordination or balance for 24 hours.  -Following Day: Return to full activities including work.  -Diet: Eat and drink normally unless instructed otherwise.    TREATMENT FOR COMMON SIDE EFFECTS:  *Sore Throat - treat with throat lozenges, gargle with warm salt water.  *Mild abdominal pain & bloating- rest and take liquids only.    SYMPTOMS TO WATCH FOR AND REPORT TO YOUR PHYSICIAN:  1. Chills or fever occurring 24 hours after procedure.  2. Pain in chest.  3. SEVERE abdominal pain or bloating.  4. Rectal bleeding which could be maroon or black.    If you have any questions or problems, please call your Physician:    Tadeo Murcia MD     If a complication or emergency situation arises and you are unable to reach your Physician - GO TO THE EMERGENCY ROOM.

## 2017-12-15 VITALS
HEIGHT: 59 IN | TEMPERATURE: 99 F | WEIGHT: 158 LBS | OXYGEN SATURATION: 99 % | DIASTOLIC BLOOD PRESSURE: 60 MMHG | HEART RATE: 72 BPM | SYSTOLIC BLOOD PRESSURE: 132 MMHG | RESPIRATION RATE: 18 BRPM | BODY MASS INDEX: 31.85 KG/M2

## 2017-12-20 ENCOUNTER — TELEPHONE (OUTPATIENT)
Dept: NEUROLOGY | Facility: HOSPITAL | Age: 67
End: 2017-12-20

## 2017-12-20 DIAGNOSIS — A04.8 H. PYLORI INFECTION: Primary | ICD-10-CM

## 2017-12-20 RX ORDER — CLARITHROMYCIN 500 MG/1
500 TABLET, FILM COATED ORAL 2 TIMES DAILY
Qty: 28 TABLET | Refills: 0 | Status: SHIPPED | OUTPATIENT
Start: 2017-12-20 | End: 2018-01-03

## 2017-12-20 RX ORDER — AMOXICILLIN 500 MG/1
1000 TABLET, FILM COATED ORAL EVERY 12 HOURS
Qty: 56 TABLET | Refills: 0 | Status: SHIPPED | OUTPATIENT
Start: 2017-12-20 | End: 2018-01-04 | Stop reason: SDUPTHER

## 2017-12-20 RX ORDER — OMEPRAZOLE 20 MG/1
20 CAPSULE, DELAYED RELEASE ORAL 2 TIMES DAILY
Qty: 28 CAPSULE | Refills: 0 | Status: SHIPPED | OUTPATIENT
Start: 2017-12-20 | End: 2018-01-04 | Stop reason: SDUPTHER

## 2017-12-20 NOTE — TREATMENT PLAN
[facility] Gastroenterology Note    I have reviewed the patient's pathology results and discussed them with her.  Gastric biopsies were positive for H.  Pylori.  I have sent prescriptions for 14 days of triple therapy to her pharmacy.

## 2017-12-21 NOTE — TELEPHONE ENCOUNTER
"Patient states she spoke to someone yesterday and told them all of the medications she is on and they told her it was OK to take her triple therapy for H.Pylori and afinitor.  Per [facility] and Dr. Dorantes, the afinitor should be held until the triple therapy is complete. I see a note that she spoke to Dr. Murcia but ptArnold Sheffield that is not who she talked to that she was a lady and she told them EVERYTHING she was taking and they told her she could take it all together.     Patient was very upset because she states that she was not told of this and feels like she was given "bad information"  I apologized and told her that I was just trying to help.  She agreed to hold the Afinitor and call us when she started it to make an appt.     "

## 2017-12-26 ENCOUNTER — TELEPHONE (OUTPATIENT)
Dept: NEUROLOGY | Facility: HOSPITAL | Age: 67
End: 2017-12-26

## 2017-12-26 NOTE — TELEPHONE ENCOUNTER
Pt having nausea and vomiting.  Pt started taking new medications on Thursday, 12/21/17, and starting vomiting on Sunday.  Pt stop taking medication due to n/v. Pt has Phenergan previously prescribed, but it also causes n/v.  Pt  Pt would like to have other medications prescribed.  Pt instructed to stay hydrated. Pt notified she will be contacted with Dr. Twin moya.

## 2017-12-26 NOTE — TELEPHONE ENCOUNTER
----- Message from Annabella Gagnon sent at 12/26/2017  8:33 AM CST -----  Contact: Patient   GI- Patient is having side effects from the meds. Patient is throwing up and nausea. Call back number [phone]

## 2017-12-28 ENCOUNTER — TELEPHONE (OUTPATIENT)
Dept: NEUROLOGY | Facility: HOSPITAL | Age: 67
End: 2017-12-28

## 2017-12-28 NOTE — TELEPHONE ENCOUNTER
----- Message from Annabella Gagnon sent at 12/28/2017  3:32 PM CST -----  Contact: Patient  GI- Patient was calling back because she does not know what to do because she can no longer take the meds that Dr Murcia prescribed. She would like direction. Patients call back number is [phone]

## 2017-12-28 NOTE — TELEPHONE ENCOUNTER
Pt n/w with triple therapy meds prescribed for positive H. Pylori.  Pt notified Dr. Murcia will be notified of status of therapy and she will be contacted.  Dr. Dorantes notified pt has not started Affinitor and has scheduled follow up appt on tomorrow, 12/29/17.  Per Dr. Dorantes follow up appt rescheduled to 1/29/18 at 11am.  This gives pt time for 14 day revised triple therapy and 2 weeks Affinitor treatments.  Pt acknowledged understanding.

## 2018-01-04 DIAGNOSIS — A04.8 H. PYLORI INFECTION: ICD-10-CM

## 2018-01-04 RX ORDER — AMOXICILLIN 500 MG/1
1000 TABLET, FILM COATED ORAL EVERY 12 HOURS
Qty: 56 TABLET | Refills: 0 | Status: SHIPPED | OUTPATIENT
Start: 2018-01-04 | End: 2018-01-11 | Stop reason: SINTOL

## 2018-01-04 RX ORDER — LEVOFLOXACIN 500 MG/1
500 TABLET, FILM COATED ORAL DAILY
Qty: 14 TABLET | Refills: 0 | Status: SHIPPED | OUTPATIENT
Start: 2018-01-04 | End: 2018-01-11 | Stop reason: SINTOL

## 2018-01-04 RX ORDER — OMEPRAZOLE 20 MG/1
20 CAPSULE, DELAYED RELEASE ORAL 2 TIMES DAILY
Qty: 28 CAPSULE | Refills: 0 | Status: SHIPPED | OUTPATIENT
Start: 2018-01-04 | End: 2018-03-22

## 2018-01-04 NOTE — PROGRESS NOTES
Patient unable to tolerate standard triple therapy regimen for treatment of H.pylori. Prescribed alternative Levaquin based triple therapy for treatment. Discussed plan over telephone with patient and she verbalized understanding.

## 2018-01-09 ENCOUNTER — TELEPHONE (OUTPATIENT)
Dept: NEUROLOGY | Facility: HOSPITAL | Age: 68
End: 2018-01-09

## 2018-01-09 NOTE — TELEPHONE ENCOUNTER
"Pt unable to swallow medications prescribed on last week.  Per pt "I swallow them and it comes right up".  Pt has tried placing pills on back of tongue, placing in applesauce.  Pt is not having success keeping anything down, just a little water. Pt instructed to hydrate as much as possible, but stated "I can't keep anything down, little ensure".  Pt to be notified with Dr. Murcia recommendations. Pt feels like stomach is upset.  Pt instructed to go to ER for hydration.   "

## 2018-01-09 NOTE — TELEPHONE ENCOUNTER
----- Message from Yeni Mercer sent at 1/9/2018 11:25 AM CST -----  Contact: Patient  GI:  Patient states she cannot take her medication, the pill gets stuck in her throat.  She would like a call back at [phone].  Thank you  abd

## 2018-01-11 ENCOUNTER — TELEPHONE (OUTPATIENT)
Dept: NEUROLOGY | Facility: HOSPITAL | Age: 68
End: 2018-01-11

## 2018-01-11 ENCOUNTER — HOSPITAL ENCOUNTER (EMERGENCY)
Facility: HOSPITAL | Age: 68
Discharge: HOME | End: 2018-01-12
Attending: EMERGENCY MEDICINE
Payer: MEDICARE

## 2018-01-11 VITALS
HEIGHT: 59 IN | WEIGHT: 155 LBS | DIASTOLIC BLOOD PRESSURE: 72 MMHG | RESPIRATION RATE: 18 BRPM | TEMPERATURE: 98 F | OXYGEN SATURATION: 97 % | BODY MASS INDEX: 31.25 KG/M2 | SYSTOLIC BLOOD PRESSURE: 133 MMHG | HEART RATE: 89 BPM

## 2018-01-11 DIAGNOSIS — C78.7 METASTATIC CANCER TO LIVER: Primary | ICD-10-CM

## 2018-01-11 DIAGNOSIS — K22.4 MOTILITY DISORDER, ESOPHAGEAL: ICD-10-CM

## 2018-01-11 DIAGNOSIS — E86.0 DEHYDRATION, MODERATE: ICD-10-CM

## 2018-01-11 DIAGNOSIS — R11.10 VOMITING: Primary | ICD-10-CM

## 2018-01-11 LAB
ALBUMIN SERPL BCP-MCNC: 3.3 G/DL
ALP SERPL-CCNC: 154 U/L
ALT SERPL W/O P-5'-P-CCNC: 28 U/L
ANION GAP SERPL CALC-SCNC: 9 MMOL/L
AST SERPL-CCNC: 42 U/L
BACTERIA #/AREA URNS HPF: NORMAL /HPF
BASOPHILS # BLD AUTO: 0.03 K/UL
BASOPHILS NFR BLD: 0.3 %
BILIRUB SERPL-MCNC: 0.5 MG/DL
BILIRUB UR QL STRIP: NEGATIVE
BUN SERPL-MCNC: 26 MG/DL
CALCIUM SERPL-MCNC: 9.6 MG/DL
CHLORIDE SERPL-SCNC: 110 MMOL/L
CLARITY UR: CLEAR
CO2 SERPL-SCNC: 20 MMOL/L
COLOR UR: YELLOW
CREAT SERPL-MCNC: 1.6 MG/DL
DIFFERENTIAL METHOD: ABNORMAL
EOSINOPHIL # BLD AUTO: 0.1 K/UL
EOSINOPHIL NFR BLD: 1.3 %
ERYTHROCYTE [DISTWIDTH] IN BLOOD BY AUTOMATED COUNT: 12.4 %
EST. GFR  (AFRICAN AMERICAN): 39 ML/MIN/1.73 M^2
EST. GFR  (NON AFRICAN AMERICAN): 34 ML/MIN/1.73 M^2
GLUCOSE SERPL-MCNC: 93 MG/DL
GLUCOSE UR QL STRIP: NEGATIVE
HCT VFR BLD AUTO: 35.5 %
HGB BLD-MCNC: 11.6 G/DL
HGB UR QL STRIP: NEGATIVE
HYALINE CASTS #/AREA URNS LPF: 0 /LPF
KETONES UR QL STRIP: NEGATIVE
LACTATE SERPL-SCNC: 0.6 MMOL/L
LEUKOCYTE ESTERASE UR QL STRIP: NEGATIVE
LYMPHOCYTES # BLD AUTO: 1.8 K/UL
LYMPHOCYTES NFR BLD: 17.8 %
MCH RBC QN AUTO: 28.7 PG
MCHC RBC AUTO-ENTMCNC: 32.7 G/DL
MCV RBC AUTO: 88 FL
MICROSCOPIC COMMENT: NORMAL
MONOCYTES # BLD AUTO: 0.7 K/UL
MONOCYTES NFR BLD: 6.9 %
NEUTROPHILS # BLD AUTO: 7.4 K/UL
NEUTROPHILS NFR BLD: 73.4 %
NITRITE UR QL STRIP: NEGATIVE
PH UR STRIP: 6 [PH] (ref 5–8)
PLATELET # BLD AUTO: 227 K/UL
PMV BLD AUTO: 12.2 FL
POTASSIUM SERPL-SCNC: 4.7 MMOL/L
PROT SERPL-MCNC: 7.2 G/DL
PROT UR QL STRIP: ABNORMAL
RBC # BLD AUTO: 4.04 M/UL
RBC #/AREA URNS HPF: 0 /HPF (ref 0–4)
SODIUM SERPL-SCNC: 139 MMOL/L
SP GR UR STRIP: 1.02 (ref 1–1.03)
URN SPEC COLLECT METH UR: ABNORMAL
UROBILINOGEN UR STRIP-ACNC: NEGATIVE EU/DL
WBC # BLD AUTO: 10.02 K/UL
WBC #/AREA URNS HPF: 1 /HPF (ref 0–5)

## 2018-01-11 PROCEDURE — 85025 COMPLETE CBC W/AUTO DIFF WBC: CPT

## 2018-01-11 PROCEDURE — 80053 COMPREHEN METABOLIC PANEL: CPT

## 2018-01-11 PROCEDURE — 83605 ASSAY OF LACTIC ACID: CPT

## 2018-01-11 PROCEDURE — 25000003 PHARM REV CODE 250: Performed by: EMERGENCY MEDICINE

## 2018-01-11 PROCEDURE — 96361 HYDRATE IV INFUSION ADD-ON: CPT

## 2018-01-11 PROCEDURE — 81000 URINALYSIS NONAUTO W/SCOPE: CPT

## 2018-01-11 PROCEDURE — 96374 THER/PROPH/DIAG INJ IV PUSH: CPT

## 2018-01-11 PROCEDURE — 63600175 PHARM REV CODE 636 W HCPCS: Performed by: EMERGENCY MEDICINE

## 2018-01-11 PROCEDURE — 99284 EMERGENCY DEPT VISIT MOD MDM: CPT | Mod: 25

## 2018-01-11 RX ORDER — AMOXICILLIN 400 MG/5ML
400 POWDER, FOR SUSPENSION ORAL 3 TIMES DAILY
Qty: 150 ML | Refills: 0 | Status: SHIPPED | OUTPATIENT
Start: 2018-01-11 | End: 2018-01-21

## 2018-01-11 RX ORDER — ONDANSETRON 4 MG/1
4 TABLET, FILM COATED ORAL EVERY 6 HOURS PRN
Qty: 20 TABLET | Refills: 2 | Status: SHIPPED | OUTPATIENT
Start: 2018-01-11

## 2018-01-11 RX ORDER — METOCLOPRAMIDE HYDROCHLORIDE 5 MG/ML
10 INJECTION INTRAMUSCULAR; INTRAVENOUS
Status: COMPLETED | OUTPATIENT
Start: 2018-01-11 | End: 2018-01-11

## 2018-01-11 RX ORDER — TRAMADOL HYDROCHLORIDE AND ACETAMINOPHEN 37.5; 325 MG/1; MG/1
1 TABLET, FILM COATED ORAL EVERY 4 HOURS PRN
Qty: 18 TABLET | Refills: 0 | Status: SHIPPED | OUTPATIENT
Start: 2018-01-11 | End: 2018-01-21

## 2018-01-11 RX ORDER — ONDANSETRON 4 MG/1
8 TABLET, ORALLY DISINTEGRATING ORAL
Status: DISCONTINUED | OUTPATIENT
Start: 2018-01-11 | End: 2018-01-11

## 2018-01-11 RX ORDER — METOCLOPRAMIDE 10 MG/1
10 TABLET ORAL
Qty: 30 TABLET | Refills: 2 | Status: SHIPPED | OUTPATIENT
Start: 2018-01-11 | End: 2018-04-23

## 2018-01-11 RX ORDER — CLARITHROMYCIN 250 MG/5ML
250 FOR SUSPENSION ORAL 2 TIMES DAILY
Qty: 140 ML | Refills: 2 | Status: SHIPPED | OUTPATIENT
Start: 2018-01-11 | End: 2018-01-25

## 2018-01-11 RX ORDER — ONDANSETRON 4 MG/1
4 TABLET, ORALLY DISINTEGRATING ORAL
Status: COMPLETED | OUTPATIENT
Start: 2018-01-11 | End: 2018-01-11

## 2018-01-11 RX ADMIN — METOCLOPRAMIDE 10 MG: 5 INJECTION, SOLUTION INTRAMUSCULAR; INTRAVENOUS at 09:01

## 2018-01-11 RX ADMIN — SODIUM CHLORIDE, PRESERVATIVE FREE 1000 ML: 5 INJECTION INTRAVENOUS at 08:01

## 2018-01-11 RX ADMIN — ONDANSETRON 4 MG: 4 TABLET, ORALLY DISINTEGRATING ORAL at 09:01

## 2018-01-11 RX ADMIN — SODIUM CHLORIDE, SODIUM LACTATE, POTASSIUM CHLORIDE, AND CALCIUM CHLORIDE 500 ML: 600; 310; 30; 20 INJECTION, SOLUTION INTRAVENOUS at 09:01

## 2018-01-11 NOTE — TELEPHONE ENCOUNTER
----- Message from Mylene Rizvi RN sent at 1/11/2018  3:57 PM CST -----  Contact: patients daughterTaya   Dr Dorantes,  Just an FYI, I phoned this patient to see how she was doing and she is not doing well at all, very weak and dehydrated and unable to take any medication for the H. Pylori that Dr Murcia prescribed for her and in pain.  I instructed her to go to the ED at [place] to be checked out.  She said that she will and I called her daughter to tell her as well.  Thanks,  Lurdes    ----- Message -----  From: Christine Liu  Sent: 1/11/2018   3:02 PM  To: [facility]- Patients daughterTaya called stating the patient is in pain and would like pain medication called into [facility] in [place]  [phone]/ Taya can be reached at [phone].

## 2018-01-11 NOTE — TELEPHONE ENCOUNTER
Taya, daughter, states pt is in pain and requesting pain medications.  Taya notified Dr. Dorantes will be notified of request.  Pt also instructed to go to Emergency Department for pain.  Taya acknowledged understanding.

## 2018-01-11 NOTE — TELEPHONE ENCOUNTER
Phoned patient and discussed her pain and other issues.  See message below forwardde to Dr Dorantes to inform him of being advised to go to the ED for a workup.

## 2018-01-11 NOTE — TELEPHONE ENCOUNTER
----- Message from Christine Liu sent at 1/11/2018  3:02 PM CST -----  Contact: patients Taya vogel  RR- Patients daughterTaya called stating the patient is in pain and would like pain medication called into [facility] in [place]  [phone]/ Taya can be reached at [phone].

## 2018-01-11 NOTE — TELEPHONE ENCOUNTER
----- Message from Christine Liu sent at 1/11/2018  3:02 PM CST -----  Contact: patients Taya vogel  RR- Patients daughterTaya called stating the patient is in pain and would like pain medication called into [facility] in [place]  [phone]/ Taya can be reached at [phone].

## 2018-01-12 NOTE — PROVIDER PROGRESS NOTES - EMERGENCY DEPT.
Encounter Date: 1/11/2018    ED Physician Progress Notes           I initially evaluated this patient and ordered a workup while in triage.  The patient will receive a full evaluation in the ED when space is available.  All results from tests ordered in triage will not be followed by the intake team, including myself. All results will be followed by the ED Main or [place] provider.    This is a 67 y.o. female who presents with decreased PO intake, unable to take antibiotics for peptic ulcer due to the pills getting stuck.  Daughter is concerned the patient may be dehydrated.  On exam, patient has mildly dry mucous membranes, mildly tired/week, but otherwise appears in no distress.  No significant abdominal tenderness on exam.    Workup ordered includes cbc, cmp, lactate, UA. NS 1L bolus ordered.    Care turned over to Dr. Davison pending labs and disposition.

## 2018-01-12 NOTE — ED PROVIDER NOTES
Encounter Date: 1/11/2018       History     Chief Complaint   Patient presents with    Emesis     States vomits constantly since being diagnosed with carcinoid in May, 2017. Today, unable to hold meds down. When questioned further, patient states that she has been able to tolerate some Ensure. Presents awake, alert, oriented with c/o abdominal and chest pain.      HPI  Review of patient's allergies indicates:   Allergen Reactions    Epinephrine     Diltiazem Rash and Other (See Comments)     Skin peels    Morphine Rash     Past Medical History:   Diagnosis Date    Anticoagulant long-term use     Arthritis     Blood transfusion     Coronary artery disease     Diabetes mellitus     Diabetes mellitus, type 2     Diabetic retinopathy     Disorder of kidney and ureter     H/O coronary angiogram     Hypertension     Liver cancer     Lung metastases 12/5/2017    Malignant carcinoid tumor of pancreas     MI (myocardial infarction)     Nausea 11/21/2017    Neuroendocrine carcinoma, 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    PDR (proliferative diabetic retinopathy)     Secondary malignant carcinoid tumor of liver     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     Past Surgical History:   Procedure Laterality Date    ABDOMINAL SURGERY      CARDIAC CATHETERIZATION      COLECTOMY      r/t bowel injury from hysterectomy    COLONOSCOPY      CYST REMOVAL      soft tissue on forehead    EYE SURGERY      HERNIA REPAIR      hiatal hernia    HYSTERECTOMY      LIVER BIOPSY  07/2017     Family History   Problem Relation Age of Onset    Mental illness Mother     Diabetes Father     Cancer Father      pancreatic    Diabetes Sister     Stomach cancer Sister     Cancer Sister      gastric    Kidney disease Brother     Diabetes Daughter     No Known Problems Daughter     No Known Problems Son     No Known Problems Son     No Known Problems Brother     No Known Problems Brother     No Known  "Problems Brother     Cancer Maternal Aunt     Cancer Maternal Aunt      Social History   Substance Use Topics    Smoking status: Never Smoker    Smokeless tobacco: Never Used    Alcohol use No      Comment: Occasionally     Review of Systems    Physical Exam     Initial Vitals [01/11/18 1835]   BP Pulse Resp Temp SpO2   (!) 196/91 88 16 97.6 °F (36.4 °C) 100 %      MAP       126         Physical Exam    ED Course   Procedures  Labs Reviewed   CBC W/ AUTO DIFFERENTIAL - Abnormal; Notable for the following:        Result Value    Hemoglobin 11.6 (*)     Hematocrit 35.5 (*)     Gran% 73.4 (*)     Lymph% 17.8 (*)     All other components within normal limits   URINALYSIS - Abnormal; Notable for the following:     Protein, UA 2+ (*)     All other components within normal limits   COMPREHENSIVE METABOLIC PANEL - Abnormal; Notable for the following:     CO2 20 (*)     BUN, Bld 26 (*)     Creatinine 1.6 (*)     Albumin 3.3 (*)     Alkaline Phosphatase 154 (*)     AST 42 (*)     eGFR if  39 (*)     eGFR if non  34 (*)     All other components within normal limits   LACTIC ACID, PLASMA   URINALYSIS MICROSCOPIC             Medical Decision Making:   Differential Diagnosis:   Bowel obstruction; esophageal obstruction; esophageal motility disorder.  ED Management:  The patient was hydrated here in the department.  Radiographs show no evidence of disruption with a nonobstructive gas pattern.  Antiemetics as well as bowel motility agent (regular) was administered in the Department: After these interventions, the patient said that she felt "much better".                       ED Course     Antiemetics as well as bowel motility agent (regular) was administered in the Department: After these interventions, the patient said that she felt "much better".    I discussed the case with Dr. Tirado, neuroendocrine service, as per the protocol for this facility.  I recommended a re-consideration of the " form of the Abx regimen to allow for liquid      Clinical Impression:   The primary encounter diagnosis was Vomiting. Diagnoses of Motility disorder, esophageal and Dehydration, moderate were also pertinent to this visit.                           Buck Davison MD  01/11/18 [id number]

## 2018-01-12 NOTE — ED NOTES
Pt presents to ED c/o abd pain X a few weeks. Pt reports nausea, denies vomiting and diarrhea. Pt reports having a heaviness in chest X 1 month. Reports pain to chest is intermittent. Pt reports hx of carcinoid. Pt states she has been able to drink ensure and eat oranges at home.

## 2018-01-15 ENCOUNTER — TELEPHONE (OUTPATIENT)
Dept: PHARMACY | Facility: CLINIC | Age: 68
End: 2018-01-15

## 2018-01-15 NOTE — TELEPHONE ENCOUNTER
Phone answered by patient's .  He was on the way home and would have patient call [facility] for follow up of Afinitor.

## 2018-01-15 NOTE — TELEPHONE ENCOUNTER
Spoke with patient regarding her Afinitor.  She states that she has not started the medication yet as she is being treated for H. Pylori with Amox/Clarithromycin/Prilosec.  She was instructed to complete course, then get re-evaluated prior to initiating Afinitor.  Next MD appointment is scheduled for 1/30/18.  Will follow up after this appointment to determine therapy status.

## 2018-01-30 ENCOUNTER — OFFICE VISIT (OUTPATIENT)
Dept: NEUROLOGY | Facility: HOSPITAL | Age: 68
End: 2018-01-30
Attending: INTERNAL MEDICINE
Payer: MEDICARE

## 2018-01-30 VITALS
WEIGHT: 155.31 LBS | DIASTOLIC BLOOD PRESSURE: 81 MMHG | TEMPERATURE: 99 F | SYSTOLIC BLOOD PRESSURE: 159 MMHG | HEART RATE: 87 BPM | HEIGHT: 59 IN | BODY MASS INDEX: 31.31 KG/M2

## 2018-01-30 DIAGNOSIS — C78.00 MALIGNANT NEOPLASM METASTATIC TO LUNG, UNSPECIFIED LATERALITY: ICD-10-CM

## 2018-01-30 DIAGNOSIS — C7B.8 SECONDARY NEUROENDOCRINE TUMOR OF LIVER: ICD-10-CM

## 2018-01-30 DIAGNOSIS — C7A.8 NEUROENDOCRINE CARCINOMA, UNKNOWN PRIMARY SITE: Primary | ICD-10-CM

## 2018-01-30 PROCEDURE — 99213 OFFICE O/P EST LOW 20 MIN: CPT | Performed by: INTERNAL MEDICINE

## 2018-01-30 PROCEDURE — 99214 OFFICE O/P EST MOD 30 MIN: CPT | Mod: ,,, | Performed by: INTERNAL MEDICINE

## 2018-01-30 NOTE — PROGRESS NOTES
NOLANETS:  [facility]  A collaboration between [facility] and [facility]    PATIENT: Lala Ames  MRN: [id number]  DATE: 1/30/2018      Diagnosis:   1. Neuroendocrine carcinoma, unknown primary site    2. Secondary neuroendocrine tumor of liver    3. Malignant neoplasm metastatic to lung, unspecified laterality        Chief Complaint: Follow-up (pt follow up on recent new medication used)      Oncologic History:      Oncologic History Neuroendocrine  tumor unknown primary   Metastatic disease to liver diagnosed 7/2017   Metastatic disease to lung diagnosed 11/2017     Oncologic Treatment Sandostatin 8/2017 - 10/2017  Sutent 8/2017 x 8 weeks (discontinue due to side effects)    Pathology Well differentiated neuroendocrine tumor, Ki-67 5%           Subjective:    Interval History: Ms. Ames is a 67 y.o. female who is seen in follow-up for a neuroendocrine tumor of unknown primary.  Since I had seen her last she is met with Dr. Murcia and underwent endoscopy.  This did reveal H. pylori and she was placed on treatment, however, has been unable to tolerate treatment.  She still has ongoing difficulty swallowing along with nausea and vomiting.  She has not started on Afinitor because of this.    Her history dates to 7/2017 when she sought medical attention for abdominal pain she does have a history of chronic kidney disease and a noncontrasted CT of the abdomen was performed as well as an ultrasound which had shown  findings consistent with metastatic disease to the liver.  She underwent a liver biopsy with pathology revealing a well-differentiated neuroendocrine tumor with Ki-67 of 5%.  Subsequent MRI was performed which also did not reveal a primary site but did confirm disease in her liver.  She was initially started on Sandostatin in 8/2017 as well as Sutent.  She took Sutent for approximately 8 weeks but  experienced worsening nausea, vomiting, decreased appetite and weight loss and this was discontinued.  At the same time a chromogranin level was measured which had increased since her initial diagnosis.  MRI had revealed substantial disease in the liver and CT of the chest had also revealed the presence of lung metastases.          Past Medical History:   Past Medical History:   Diagnosis Date    Anticoagulant long-term use     Arthritis     Blood transfusion     Coronary artery disease     Diabetes mellitus     Diabetes mellitus, type 2     Diabetic retinopathy     Disorder of kidney and ureter     H/O coronary angiogram     Hypertension     Liver cancer     Lung metastases 12/5/2017    Malignant carcinoid tumor of pancreas     MI (myocardial infarction)     Nausea 11/21/2017    Neuroendocrine carcinoma, 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    PDR (proliferative diabetic retinopathy)     Secondary malignant carcinoid tumor of liver     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       Past Surgical HIstory:   Past Surgical History:   Procedure Laterality Date    ABDOMINAL SURGERY      CARDIAC CATHETERIZATION      COLECTOMY      r/t bowel injury from hysterectomy    COLONOSCOPY      CYST REMOVAL      soft tissue on forehead    EYE SURGERY      HERNIA REPAIR      hiatal hernia    HYSTERECTOMY      LIVER BIOPSY  07/2017       Family History:   Family History   Problem Relation Age of Onset    Mental illness Mother     Diabetes Father     Cancer Father      pancreatic    Diabetes Sister     Stomach cancer Sister     Cancer Sister      gastric    Kidney disease Brother     Diabetes Daughter     No Known Problems Daughter     No Known Problems Son     No Known Problems Son     No Known Problems Brother     No Known Problems Brother     No Known Problems Brother     Cancer Maternal Aunt     Cancer Maternal Aunt        Social History:  reports that she has never smoked. She has  "never used smokeless tobacco. She reports that she does not drink alcohol or use drugs.    Allergies:  Review of patient's allergies indicates:   Allergen Reactions    Epinephrine     Diltiazem Rash and Other (See Comments)     Skin peels    Morphine Rash       Medications:  Current Outpatient Prescriptions   Medication Sig Dispense Refill    amlodipine (NORVASC) 10 MG tablet Take 1 tablet (10 mg total) by mouth once daily. 90 tablet 3    aspirin (ECOTRIN) 81 MG EC tablet Take 1 tablet (81 mg total) by mouth once daily.      blood sugar diagnostic (BLOOD GLUCOSE TEST) Strp 1 strip by Misc.(Non-Drug; Combo Route) route 3 (three) times daily as needed. 300 strip 3    ergocalciferol (ERGOCALCIFEROL) 50,000 unit Cap Take 1 capsule (50,000 Units total) by mouth every 7 days. 12 capsule 0    nitroGLYCERIN (NITROSTAT) 0.4 MG SL tablet Place 1 tablet (0.4 mg total) under the tongue every 5 (five) minutes as needed for Chest pain. 30 tablet 11    ondansetron (ZOFRAN) 4 MG tablet Take 1 tablet (4 mg total) by mouth every 6 (six) hours as needed for Nausea. 20 tablet 2    pen needle, diabetic (NOVOFINE 30) 30 gauge x 1/3" Ndle 1 Units by Misc.(Non-Drug; Combo Route) route 3 (three) times daily before meals. 400 each 2    promethazine (PHENERGAN) 12.5 MG Supp Place 1 suppository (12.5 mg total) rectally every 6 (six) hours as needed. 60 suppository 0    promethazine (PHENERGAN) 12.5 MG Tab Take 1 tablet (12.5 mg total) by mouth every 6 (six) hours as needed. 60 tablet 0    everolimus 10 mg Tab Take 1 tablet (10 mg total) by mouth once daily. 30 tablet 6    megestrol (MEGACE) 400 mg/10 mL (40 mg/mL) Susp Take 10 mLs (400 mg total) by mouth 2 (two) times daily. 280 mL 0    metoclopramide HCl (REGLAN) 10 MG tablet Take 1 tablet (10 mg total) by mouth 3 (three) times daily before meals. 30 tablet 2    omeprazole (PRILOSEC) 20 MG capsule Take 1 capsule (20 mg total) by mouth 2 (two) times daily. 28 capsule 0     No " "current facility-administered medications for this visit.        Review of Systems   Constitutional: Positive for activity change, appetite change and unexpected weight change. Negative for chills and fever.   HENT: Positive for trouble swallowing. Negative for congestion, hearing loss and nosebleeds.    Eyes: Negative for visual disturbance.   Respiratory: Positive for shortness of breath. Negative for cough.    Cardiovascular: Negative for chest pain and palpitations.   Gastrointestinal: Positive for abdominal pain, diarrhea, nausea and vomiting. Negative for blood in stool and constipation.   Genitourinary: Negative for dysuria.   Musculoskeletal: Negative for back pain and gait problem.   Skin: Negative for color change and rash.   Neurological: Negative for dizziness, weakness and headaches.   Hematological: Negative for adenopathy. Does not bruise/bleed easily.   Psychiatric/Behavioral: Negative for confusion.       ECOG Performance Status: 1   Objective:      Vitals:   Vitals:    01/30/18 1043   BP: (!) 159/81   Pulse: 87   Temp: 99.1 °F (37.3 °C)   TempSrc: Oral   Weight: 70.5 kg (155 lb 5 oz)   Height: 4' 11" (1.499 m)     BMI: Body mass index is 31.37 kg/m².    Physical Exam   Constitutional: She is oriented to person, place, and time. She appears well-developed and well-nourished. No distress.   HENT:   Head: Normocephalic.   Mouth/Throat: No oropharyngeal exudate.   Eyes: EOM are normal. No scleral icterus.   Neck: Neck supple. No tracheal deviation present. No thyromegaly present.   Cardiovascular: Normal rate and regular rhythm.    Pulmonary/Chest: Effort normal and breath sounds normal. No respiratory distress. She has no wheezes. She has no rales.   Abdominal: Soft. She exhibits no distension and no mass. There is tenderness. There is no rebound and no guarding.   Musculoskeletal: Normal range of motion. She exhibits no edema.   Lymphadenopathy:     She has no cervical adenopathy.   Neurological: She " is alert and oriented to person, place, and time. No cranial nerve deficit.   Skin: Skin is warm and dry.   Psychiatric: She has a normal mood and affect.       Laboratory Data:  Admission on 01/11/2018, Discharged on 01/12/2018   Component Date Value Ref Range Status    WBC 01/11/2018 10.02  3.90 - 12.70 K/uL Final    RBC 01/11/2018 4.04  4.00 - 5.40 M/uL Final    Hemoglobin 01/11/2018 11.6* 12.0 - 16.0 g/dL Final    Hematocrit 01/11/2018 35.5* 37.0 - 48.5 % Final    MCV 01/11/2018 88  82 - 98 fL Final    MCH 01/11/2018 28.7  27.0 - 31.0 pg Final    MCHC 01/11/2018 32.7  32.0 - 36.0 g/dL Final    RDW 01/11/2018 12.4  11.5 - 14.5 % Final    Platelets 01/11/2018 227  150 - 350 K/uL Final    MPV 01/11/2018 12.2  9.2 - 12.9 fL Final    Gran # (ANC) 01/11/2018 7.4  1.8 - 7.7 K/uL Final    Lymph # 01/11/2018 1.8  1.0 - 4.8 K/uL Final    Mono # 01/11/2018 0.7  0.3 - 1.0 K/uL Final    Eos # 01/11/2018 0.1  0.0 - 0.5 K/uL Final    Baso # 01/11/2018 0.03  0.00 - 0.20 K/uL Final    Gran% 01/11/2018 73.4* 38.0 - 73.0 % Final    Lymph% 01/11/2018 17.8* 18.0 - 48.0 % Final    Mono% 01/11/2018 6.9  4.0 - 15.0 % Final    Eosinophil% 01/11/2018 1.3  0.0 - 8.0 % Final    Basophil% 01/11/2018 0.3  0.0 - 1.9 % Final    Differential Method 01/11/2018 Automated   Final    Specimen UA 01/11/2018 Urine, Clean Catch   Final    Color, UA 01/11/2018 Yellow  Yellow, Straw, Yeni Final    Appearance, UA 01/11/2018 Clear  Clear Final    pH, UA 01/11/2018 6.0  5.0 - 8.0 Final    Specific Gravity, UA 01/11/2018 1.020  1.005 - 1.030 Final    Protein, UA 01/11/2018 2+* Negative Final    Comment: Recommend a 24 hour urine protein or a urine   protein/creatinine ratio if globulin induced proteinuria is  clinically suspected.      Glucose, UA 01/11/2018 Negative  Negative Final    Ketones, UA 01/11/2018 Negative  Negative Final    Bilirubin (UA) 01/11/2018 Negative  Negative Final    Occult Blood UA 01/11/2018 Negative   Negative Final    Nitrite, UA 01/11/2018 Negative  Negative Final    Urobilinogen, UA 01/11/2018 Negative  <2.0 EU/dL Final    Leukocytes, UA 01/11/2018 Negative  Negative Final    Lactate (Lactic Acid) 01/11/2018 0.6  0.5 - 2.2 mmol/L Final    Comment: Falsely low lactic acid results can be found in samples   containing >=13.0 mg/dL total bilirubin and/or >=3.5 mg/dL   direct bilirubin.      Sodium 01/11/2018 139  136 - 145 mmol/L Final    Potassium 01/11/2018 4.7  3.5 - 5.1 mmol/L Final    Chloride 01/11/2018 110  95 - 110 mmol/L Final    CO2 01/11/2018 20* 23 - 29 mmol/L Final    Glucose 01/11/2018 93  70 - 110 mg/dL Final    BUN, Bld 01/11/2018 26* 8 - 23 mg/dL Final    Creatinine 01/11/2018 1.6* 0.5 - 1.4 mg/dL Final    Calcium 01/11/2018 9.6  8.7 - 10.5 mg/dL Final    Total Protein 01/11/2018 7.2  6.0 - 8.4 g/dL Final    Albumin 01/11/2018 3.3* 3.5 - 5.2 g/dL Final    Total Bilirubin 01/11/2018 0.5  0.1 - 1.0 mg/dL Final    Comment: For infants and newborns, interpretation of results should be based  on gestational age, weight and in agreement with clinical  observations.  Premature Infant recommended reference ranges:  Up to 24 hours.............<8.0 mg/dL  Up to 48 hours............<12.0 mg/dL  3-5 days..................<15.0 mg/dL  6-29 days.................<15.0 mg/dL      Alkaline Phosphatase 01/11/2018 154* 55 - 135 U/L Final    AST 01/11/2018 42* 10 - 40 U/L Final    ALT 01/11/2018 28  10 - 44 U/L Final    Anion Gap 01/11/2018 9  8 - 16 mmol/L Final    eGFR if  01/11/2018 39* >60 mL/min/1.73 m^2 Final    eGFR if non  01/11/2018 34* >60 mL/min/1.73 m^2 Final    Comment: Calculation used to obtain the estimated glomerular filtration  rate (eGFR) is the CKD-EPI equation.       RBC, UA 01/11/2018 0  0 - 4 /hpf Final    WBC, UA 01/11/2018 1  0 - 5 /hpf Final    Bacteria, UA 01/11/2018 Occasional  None-Occ /hpf Final    Hyaline Casts, UA 01/11/2018 0   0-1/lpf /lpf Final    Microscopic Comment 01/11/2018 SEE COMMENT   Final    Comment: Other formed elements not mentioned in the report are not   present in the microscopic examination.          Supplemental Diagnosis  Supplemental (2):  This case was sent to Lela Chopra MD at [facility] for consultation ([id number]). Her  consultation diagnosis is as follows:  This addendum is issued for the results of ancillary studies.  All immunostains performed and interpreted at [facility].  KI67: Positive, 5%, consistent with intermediate grade tumor. Please correlate with clinical and imaging findings for  primary tumor site.  The stain controls reacted appropriately.  Supplemental (1):  This case was sent to Lela Chopra MD at [facility] for consultation ([id number]). Her  consultation diagnosis is as follows:  OUTSIDE SLIDE REVIEW  ORIGINAL BIOPSY DATE: 7/19/2017  LIVER MASS (NEEDLE BIOPSY):  -Positive for metastatic well-differentiated neuroendocrine tumor  -No necrosis  -No significant mitotic activity  -Pending Ki-67  -Stains in microscopic section  (Electronically Signed: 2017-07-24 10:18:12 )  Diagnosed by: Dakota Javier II M.D.  FINAL PATHOLOGIC DIAGNOSIS  Liver, biopsy:  -This case is being seen in consultation at [facility]. The consultant's opinion will be reported in  an addendum.      Imaging:     MRI 11/24/ 17  Comparison: CT abdomen pelvis 7/19/17, MRI abdomen 7/10/17    Clinical information: Biopsy of segment VI lesion on 7/13/17 resulting in intermediate grade neuroendocrine tumor, primary site unknown.  Chronic kidney disease.    Technique: Multiplanar multisequence MR images of the abdomen without intravenous contrast.    Findings: Visualized lung bases and heart are unremarkable.  The liver is enlarged measuring approximately 22 cm.  There is no intrahepatic or extrahepatic biliary ductal dilatation.  Portal vein flow void appears patent.  The liver is very  heterogeneous and contains multiple areas of signal abnormality, the largest of which is located spanning segments V and VI which measures approximately 7.1 x 13.4 cm and was recently biopsied and shown to be neuroendocrine tumor.  Compared to 7/10/17 this appears to have increased in size particularly in the craniocaudad dimension (see coronal SE1, IM 14).  There are numerous scattered areas of signal abnormality throughout all lobes of the liver mostly measuring 1-2 cm likely also representing metastatic lesions.    The gallbladder shows layering sludge.  The pancreas, adrenal glands, spleen, and visualized bowel show no abnormalities.  Subcentimeter cysts noted in the kidneys.  No hydronephrosis.    Bone marrow signal and vascular structures show no significant abnormalities.   Impression       Multifocal lesions throughout in keeping with metastatic disease in patient with biopsy-proven large intermediate grade neuroendocrine tumor spanning segments V and VI.  This mass has shown interval growth compared to 7/10/17.                  Assessment:       1. Neuroendocrine carcinoma, unknown primary site    2. Secondary neuroendocrine tumor of liver    3. Malignant neoplasm metastatic to lung, unspecified laterality           Plan:     Mrs. Ames has yet to start on treatment for her neuroendocrine tumor that she's been dealing with H. pylori treatment and complications.  She will need to follow back up with Dr. Murcia for further management prior to starting on Afinitor.  I will plan to see her back after she's been on treatment for a month to assess tolerability.    Doyle Dorantes DO, FACP  Hematology & Oncology, [facility]  [address], [address]  [place]  [address]  ph. [phone]; [phone]  fax. [phone]    25 minutes were spent in coordination of patient's care, record review and counseling.  More than 50% of the time was face-to-face.

## 2018-02-02 ENCOUNTER — TELEPHONE (OUTPATIENT)
Dept: NEUROLOGY | Facility: HOSPITAL | Age: 68
End: 2018-02-02

## 2018-02-02 NOTE — TELEPHONE ENCOUNTER
----- Message from Nidia Thomson PharmD sent at 2/2/2018 11:36 AM CST -----  Dr. Dorantes and Staff:    Just to confirm, it appears Ms. Ames still has not started Afinitor and will probably not do so until after the next appointment on 3/7/18 due to further treatment for H.pylori?    Regards,  Nidia Thomson, Pharm D.   [facility]  [phone]

## 2018-02-02 NOTE — TELEPHONE ENCOUNTER
"Call made to pt to request if she completed antibiotic therapy for H. Pylori.  Pt stated "I couldn't swallow the pill, it was too big".  Pt reminded of follow up appt with Dr. Murcia on Wednesday, February 7, 2018, at 1030am.  Pt acknowledged date and time of follow up appt.  "

## 2018-02-07 ENCOUNTER — OFFICE VISIT (OUTPATIENT)
Dept: NEUROLOGY | Facility: HOSPITAL | Age: 68
End: 2018-02-07
Attending: INTERNAL MEDICINE
Payer: MEDICARE

## 2018-02-07 VITALS
WEIGHT: 151.25 LBS | RESPIRATION RATE: 16 BRPM | DIASTOLIC BLOOD PRESSURE: 84 MMHG | SYSTOLIC BLOOD PRESSURE: 180 MMHG | BODY MASS INDEX: 30.49 KG/M2 | HEIGHT: 59 IN | HEART RATE: 90 BPM

## 2018-02-07 DIAGNOSIS — R13.10 DYSPHAGIA, UNSPECIFIED TYPE: Primary | ICD-10-CM

## 2018-02-07 PROCEDURE — 99214 OFFICE O/P EST MOD 30 MIN: CPT | Performed by: INTERNAL MEDICINE

## 2018-02-07 RX ORDER — PREDNISONE 20 MG/1
20 TABLET ORAL DAILY
Qty: 7 TABLET | Refills: 0 | Status: SHIPPED | OUTPATIENT
Start: 2018-02-07 | End: 2018-02-07

## 2018-02-07 RX ORDER — AMOXICILLIN 400 MG/5ML
400 POWDER, FOR SUSPENSION ORAL 2 TIMES DAILY
Qty: 70 ML | Refills: 0 | Status: SHIPPED | OUTPATIENT
Start: 2018-02-07 | End: 2018-02-07

## 2018-02-07 NOTE — PATIENT INSTRUCTIONS
Continue liquid reglan    Stop megace  New appetite stimulant to be prescribed    Antiobiotics to be sent to pharmacy.

## 2018-02-07 NOTE — PROGRESS NOTES
"[facility]    CC: dysphagia     HPI 67 y.o. 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 She denies any previous history of stroke. She has no complaints of regurgitation or vomiting.      She has been on Sandostatin and Sunitinib since 8/2017, but discontinued Sunitinib for nausea.      Past Medical History:   Hypertension   Type II DM   CKD   CAD   Metastatic NET   Previous hysterectomy complicated by colon injury resulting in partial colectomy     Review of Systems  General ROS: negative for chills, fever; positive weight loss   Cardiovascular ROS: no chest pain or dyspnea on exertion  Gastrointestinal ROS: positive abdominal bloating and nausea     Physical Examination  BP (!) 180/84 (BP Location: Left arm)   Pulse 90   Resp 16   Ht 4' 11" (1.499 m)   Wt 68.6 kg (151 lb 3.8 oz)   LMP 08/19/1998 (Exact Date)   BMI 30.55 kg/m²   General appearance: alert, cooperative, no distress  HENT: Normocephalic, atraumatic, neck symmetrical, no nasal discharge   Lungs: clear to auscultation bilaterally, no dullness to percussion bilaterally  Heart: regular rate and rhythm without rub; no displacement of the PMI   Abdomen: soft, non-tender; bowel sounds normoactive; no organomegaly  Extremities: extremities symmetric; no clubbing, cyanosis, or edema  Neurologic: Alert and oriented X 3, normal strength, normal coordination and gait    Assessment:   Metastatic NET of unknown primary site   History of difficulty eating manifested primarily as gagging with foods/liquids/pills and poor appetite but not with food sticking in the esophagus and not with frequent vomiting. Evaluation for a swallowing disorder by ST is negative and so is endoscopic exam by EGD. Her symptoms are unlikely to be related to the finding of H pylor infection by " EGD (likely only an incidental finding). She has been intolerant of most medications and can only tolerate liquid forms. She is currently drinking 3-4 cans of ensure per day which has stabilized her weight. She does not want a PEG tube     Plan:  Stop megace  Trial of prednisone 20mg x 7 days as an appetite stimulant accepting that this therapy may result in a rise in blood glucose during the trial   Proceed with high dose Amoxicillin therapy as a rescue therapy for H pylori as she has been intolerant of two previous regimens     Continue ensure 3-4 cans per day. If she is able to maintain her weight with supplements, I do foresee any additional interventions which may benefit her from a GI standpoint other than a PEG tube         Tadeo Murcia MD   [address], [address]   [place] [address] [phone]

## 2018-02-14 ENCOUNTER — TELEPHONE (OUTPATIENT)
Dept: NEUROLOGY | Facility: HOSPITAL | Age: 68
End: 2018-02-14

## 2018-02-14 NOTE — TELEPHONE ENCOUNTER
----- Message from Alejandro Mustafa sent at 2/12/2018  1:17 PM CST -----  Contact: Pt      ----- Message -----  From: April Almazan  Sent: 2/12/2018   1:08 PM  To: Alejandro Mustafa    Pt calling to move appt from 3/7 to any day after 3/20      Pt call back number [phone]

## 2018-02-14 NOTE — TELEPHONE ENCOUNTER
Phoned patient to clarify appointments.  Made and appointment with Dr Murcia for Monday, February 26, at 2 PM, will cancel the appointment with Dr Dorantes until after she sees Dr Murcia about the H pylori and starts the afinitor for 4 weeks.  She will call us the day she starts the afinitor so we can make her an appointment with Dr Dorantes.

## 2018-03-07 ENCOUNTER — TELEPHONE (OUTPATIENT)
Dept: PHARMACY | Facility: CLINIC | Age: 68
End: 2018-03-07

## 2018-03-07 ENCOUNTER — TELEPHONE (OUTPATIENT)
Dept: NEUROLOGY | Facility: HOSPITAL | Age: 68
End: 2018-03-07

## 2018-03-07 NOTE — TELEPHONE ENCOUNTER
----- Message from Huma Quintanilla sent at 3/7/2018 10:16 AM CST -----  Contact: self  Patient states need to speak with nurse.  Pt need follow up appointment.   Please call pt at [phone]

## 2018-03-07 NOTE — TELEPHONE ENCOUNTER
Patient rescheduled H Pylori follow up for 3/21 with Dr Murcia.  Afinitor will not be initiated until after that follow up.  Patient states she has not yet decided if she wants to start Afinitor.  Agreed to follow up with patient last week of March to determine Afinitor status

## 2018-04-09 ENCOUNTER — TELEPHONE (OUTPATIENT)
Dept: NEUROLOGY | Facility: HOSPITAL | Age: 68
End: 2018-04-09

## 2018-04-09 NOTE — TELEPHONE ENCOUNTER
----- Message from Ovidio Mcguire sent at 4/9/2018  9:08 AM CDT -----  Contact: Pt   Will like a call from office regarding her having severe diarrhea     Contact::[phone]

## 2018-04-09 NOTE — TELEPHONE ENCOUNTER
Pt has diarrhea and feels drive to appt is uncomfortable.  Pt wants to notify Dr. Dorantes she does not want to take Affinitor.  Pt not sure if appt with Dr. Dorantes is needed due to not taking Affinitor.  Pt to be notified with Dr. Dorantes' recommendations.

## 2018-04-12 ENCOUNTER — TELEPHONE (OUTPATIENT)
Dept: NEUROLOGY | Facility: HOSPITAL | Age: 68
End: 2018-04-12

## 2018-04-12 NOTE — TELEPHONE ENCOUNTER
----- Message from Annabella Gagnon sent at 4/12/2018  3:35 PM CDT -----  Contact: Patient  RR- Patient needs to speak to nurse. Call back number is [phone]

## 2018-04-19 ENCOUNTER — TELEPHONE (OUTPATIENT)
Dept: HEMATOLOGY/ONCOLOGY | Facility: CLINIC | Age: 68
End: 2018-04-19

## 2018-04-19 DIAGNOSIS — C7A.8 NEUROENDOCRINE CARCINOMA, UNKNOWN PRIMARY SITE: Primary | ICD-10-CM

## 2018-04-23 ENCOUNTER — OFFICE VISIT (OUTPATIENT)
Dept: NEUROLOGY | Facility: HOSPITAL | Age: 68
End: 2018-04-23
Attending: INTERNAL MEDICINE
Payer: MEDICARE

## 2018-04-23 VITALS
BODY MASS INDEX: 29.27 KG/M2 | HEIGHT: 59 IN | HEART RATE: 89 BPM | DIASTOLIC BLOOD PRESSURE: 90 MMHG | TEMPERATURE: 98 F | SYSTOLIC BLOOD PRESSURE: 187 MMHG | WEIGHT: 145.19 LBS

## 2018-04-23 DIAGNOSIS — R13.10 DYSPHAGIA, UNSPECIFIED TYPE: Primary | ICD-10-CM

## 2018-04-23 PROCEDURE — 99214 OFFICE O/P EST MOD 30 MIN: CPT | Performed by: INTERNAL MEDICINE

## 2018-04-23 NOTE — PROGRESS NOTES
[facility]    CC: dysphagia    HPI 67 y.o. 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 Patient reports weight stable over the past month, but continues to avoid meat, rice and bread. She no longer drinks ensure as she is tired of drinking them. She did not complete steroids, reglan or amoxicillin due to feeling nauseated or gagging when swallowing. She is concerned about a chronic cough as well, but she denies fevers and chills. No black stool or hematochezia. Produces mucous emesis occasionally without blood.    She has been on Sandostatin and Sunitinib since 8/2017, but discontinued Sunitinib for nausea. 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    Additional history obtained from family member in exam room.    Past Medical History:   Diagnosis Date    Anticoagulant long-term use     Arthritis     Blood transfusion     Coronary artery disease     Diabetes mellitus     Diabetes mellitus, type 2     Diabetic retinopathy     Disorder of kidney and ureter     H/O coronary angiogram     Hypertension     Liver cancer     Lung metastases 12/5/2017    Malignant carcinoid tumor of pancreas     MI (myocardial infarction)     Nausea 11/21/2017    Neuroendocrine carcinoma, 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    PDR (proliferative diabetic retinopathy)     Secondary malignant carcinoid tumor of liver     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         Review of Systems  General ROS: negative for chills, fever or weight loss  Cardiovascular ROS: no chest pain or dyspnea on exertion  Gastrointestinal ROS: right sided abdominal pain, no change in bowel habits, or black/ bloody stools    Physical Examination  BP (!) 187/90   Pulse 89   Temp 98.1 °F (36.7 °C)  "(Oral)   Ht 4' 11" (1.499 m)   Wt 65.9 kg (145 lb 2.8 oz)   LMP 08/19/1998 (Exact Date)   BMI 29.32 kg/m²   General appearance: alert, cooperative, no distress  HENT: Normocephalic, atraumatic, neck symmetrical, no nasal discharge   Lungs: clear to auscultation bilaterally, no dullness to percussion bilaterally  Heart: regular rate and rhythm without rub; no displacement of the PMI   Abdomen: soft, epigastric tenderness; bowel sounds normoactive; no organomegaly  Extremities: extremities symmetric; no clubbing, cyanosis, or edema  Neurologic: Alert and oriented X 3, normal strength, normal coordination and gait  Psych: flat affect    Labs:  Reviewed    Imaging:  MBS 11/17 -- normal  Esophagram 11/17 -- mild thoracic esophageal dysmotility    Endoscopy  EGD 12/17-- moderate gastritis, positive for H Pylori. Normal esophagus and duodenum.    Reviewed medical records.    Assessment:   67 yoF with pmh Metastatic NET of unknown primary site presents for difficulty eating manifested primarily as gagging with foods/liquids/pills and poor appetite but not with food sticking in the esophagus and not with frequent vomiting. Evaluation for a swallowing disorder by ST and esophagram is negative and so is endoscopic exam by EGD. Her symptoms are unlikely to be related to the finding of H pylor infection by EGD (likely only an incidental finding). She has been intolerant of most medications. Weight is relatively stable for past 2 months and she does not want a PEG tube.    Plan:  -continue PO as tolerated  -if weight decreases, would recommend PEG placement as next step but no need to follow up with me unless she is asking about PEG placement       Tadeo Murcia MD   [address] [facility], [address]   [place] [address] [phone]    "

## 2018-04-30 ENCOUNTER — TELEPHONE (OUTPATIENT)
Dept: NEUROLOGY | Facility: HOSPITAL | Age: 68
End: 2018-04-30

## 2018-04-30 DIAGNOSIS — C78.00 MALIGNANT NEOPLASM METASTATIC TO LUNG, UNSPECIFIED LATERALITY: Primary | ICD-10-CM

## 2018-04-30 NOTE — TELEPHONE ENCOUNTER
Spoke with Nii at radiology at [facility].  I explained that it should be up to the radiologist if she gets contrast for the CT.     I then let her know we needed a non contrasted CT of the chest.  She will see if it can be merged into today's CT and they can add the scan.  She will call me if she needs anything else.

## 2018-04-30 NOTE — TELEPHONE ENCOUNTER
----- Message from Toño Ford sent at 4/30/2018  8:50 AM CDT -----  Contact: Nii with [facility]            Name of Who is Calling: Nii      What is the request in detail: Nii called is requesting a call back from Colette Chan the clinic reply by [facility]: no      What [facility]: [phone]

## 2018-04-30 NOTE — TELEPHONE ENCOUNTER
----- Message from April Almazan sent at 4/30/2018  8:03 AM CDT -----  Contact: Nii with [facility]   Nii calling regarding CT with contrast. She states GFR is 38.7. Pt is there waiting to find out if she can have contrast or not.      Nii call back number [phone]

## 2018-04-30 NOTE — TELEPHONE ENCOUNTER
Spoke to Nii with radiology at [facility].  The CT of the chest was completed.

## 2018-05-01 ENCOUNTER — TELEPHONE (OUTPATIENT)
Dept: PHARMACY | Facility: CLINIC | Age: 68
End: 2018-05-01

## 2018-05-01 NOTE — TELEPHONE ENCOUNTER
Patient confirms that she still has Afinitor on hand.  Advised patient to turn medication into provider or pharmacy for proper disposal.

## 2018-05-02 ENCOUNTER — OFFICE VISIT (OUTPATIENT)
Dept: HEMATOLOGY/ONCOLOGY | Facility: CLINIC | Age: 68
End: 2018-05-02
Payer: MEDICARE

## 2018-05-02 VITALS
SYSTOLIC BLOOD PRESSURE: 181 MMHG | WEIGHT: 141.75 LBS | OXYGEN SATURATION: 99 % | HEART RATE: 81 BPM | HEIGHT: 59 IN | DIASTOLIC BLOOD PRESSURE: 86 MMHG | BODY MASS INDEX: 28.58 KG/M2 | RESPIRATION RATE: 16 BRPM | TEMPERATURE: 98 F

## 2018-05-02 DIAGNOSIS — C78.00 MALIGNANT NEOPLASM METASTATIC TO LUNG, UNSPECIFIED LATERALITY: ICD-10-CM

## 2018-05-02 DIAGNOSIS — C7A.8 NEUROENDOCRINE CARCINOMA, UNKNOWN PRIMARY SITE: Primary | ICD-10-CM

## 2018-05-02 DIAGNOSIS — C7B.8 SECONDARY NEUROENDOCRINE TUMOR OF LIVER: ICD-10-CM

## 2018-05-02 PROCEDURE — 99999 PR PBB SHADOW E&M-EST. PATIENT-LVL IV: CPT | Mod: PBBFAC,,, | Performed by: INTERNAL MEDICINE

## 2018-05-02 PROCEDURE — 99215 OFFICE O/P EST HI 40 MIN: CPT | Mod: S$GLB,,, | Performed by: INTERNAL MEDICINE

## 2018-05-02 PROCEDURE — 3079F DIAST BP 80-89 MM HG: CPT | Mod: CPTII,S$GLB,, | Performed by: INTERNAL MEDICINE

## 2018-05-02 PROCEDURE — 3077F SYST BP >= 140 MM HG: CPT | Mod: CPTII,S$GLB,, | Performed by: INTERNAL MEDICINE

## 2018-05-03 ENCOUNTER — TELEPHONE (OUTPATIENT)
Dept: HEMATOLOGY/ONCOLOGY | Facility: CLINIC | Age: 68
End: 2018-05-03

## 2018-05-03 NOTE — LETTER
[facility]  [address]  [facility] [address]  Phone: [phone] May 3, 2018    Lala Ames  [facility] [facility] [address]      To Whom It May Concern:    Laal Ames is unable to participate in jury duty due to the following diagnosis.   1.  Neuroendocrine carcinoma, unknown primary site    2. Secondary neuroendocrine tumor of liver    3. Malignant neoplasm metastatic to lung, unspecified laterality    She will possibly be receiving chemotherapy.     If you have any questions or concerns, please feel free to call my office.    Sincerely,        Crispin Donnelly NP

## 2018-05-03 NOTE — TELEPHONE ENCOUNTER
----- Message from Ovidio Mcguire sent at 5/3/2018 12:34 PM CDT -----  Contact: Hoda- Daughter  Hoda is requesting a letter from dr turner concerning pt diagnoses for jury duty     Contact::[phone]

## 2018-05-03 NOTE — TELEPHONE ENCOUNTER
Patient's daughter asked for a letter excusing her mother from jury duty.  She was seen yesterday, will ask Dr. Dorantes or BOB Donnelly for a letter.      Also let the her know that I was making sure, before we schedule a 2 day MIBG scan, that with her kidney function she would be a candidate.  She understood and agreed.

## 2018-05-07 NOTE — TELEPHONE ENCOUNTER
[facility]      NEUROENDOCRINE TUMOR MULTIDISCIPLINARY TUMOR BOARD  _____________________________________________________________________    PRESENTER:   Doyle Dorantes DO    REASON FOR PRESENTATION:  Discuss radioembolization       ATTENDEES:   Surgery:              MD NOHELIA Burnham MD T. Ramcharan, MD  Interventional Radiology - Florencio Madden MD  Pathology - Liss Tavares MD  Oncology - Doyle Dorantes DO, Rafael Neal MD  Gastroenterology - Not present   Nursing  Research    PATIENT STATUS:  Established Patient    TUMOR SITE (Primary & Mets):      PATIENT SUMMARY:  Past Medical History:   Diagnosis Date    Anticoagulant long-term use     Arthritis     Blood transfusion     Coronary artery disease     Diabetes mellitus     Diabetes mellitus, type 2     Diabetic retinopathy     Disorder of kidney and ureter     H/O coronary angiogram     Hypertension     Liver cancer     Lung metastases 12/5/2017    Malignant carcinoid tumor of pancreas     MI (myocardial infarction)     Nausea 11/21/2017    Neuroendocrine carcinoma, 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    PDR (proliferative diabetic retinopathy)     Secondary malignant carcinoid tumor of liver     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       Past Surgical History:   Procedure Laterality Date    ABDOMINAL SURGERY      CARDIAC CATHETERIZATION      COLECTOMY      r/t bowel injury from hysterectomy    COLONOSCOPY      CYST REMOVAL      soft tissue on forehead    EYE SURGERY      HERNIA REPAIR      hiatal hernia    HYSTERECTOMY      LIVER BIOPSY  07/2017     ________________________________________________________________    DISCUSSION:  Pt has extensive metastatic disease in the liver. He appears to have significantly progressed in the liver since 7/2017 and also since 11/2017. He also has CKD with borderline GFR. We can either TACE or Y90. The results from Y90 may  take longer to show and she will require more contrast/procedures. My recommendation would be to move forward with TACE (bilobar treatments  4-6 weeks apart) as we will be able to  response quicker and would require less contrast/procedures. Can he also get chemo?    Previously on Sutent at [facility]  Patient decided against Afinitor/oral chemo  Not a candidate for PRRT   Can consider MIBG if can be done in patients with CKD-- Borderline GFR      BOARD RECOMMENDATIONS:   TACE   Consider MIBG scan and therapy pending recommendation from Dr. Rowan

## 2018-05-08 ENCOUNTER — CONFERENCE (OUTPATIENT)
Dept: NEUROLOGY | Facility: HOSPITAL | Age: 68
End: 2018-05-08

## 2018-05-10 NOTE — TELEPHONE ENCOUNTER
Patient would like to speak to Dr. Madden to discuss TACE.  She wants to hear more about it.  She is pretty adamant that she does not what a chemoembo, but I told her there was also bland embolizations that can be considered.  I told her we weren't sure if she would be a candidate for MIBG at this point, she does not wish to proceed with the scan if the treatment was not guaranteed.

## 2018-05-23 ENCOUNTER — HOSPITAL ENCOUNTER (EMERGENCY)
Facility: HOSPITAL | Age: 68
Discharge: HOME | End: 2018-05-23
Attending: EMERGENCY MEDICINE
Payer: MEDICARE

## 2018-05-23 VITALS
BODY MASS INDEX: 28.22 KG/M2 | WEIGHT: 140 LBS | HEART RATE: 88 BPM | HEIGHT: 59 IN | TEMPERATURE: 99 F | RESPIRATION RATE: 18 BRPM | SYSTOLIC BLOOD PRESSURE: 174 MMHG | DIASTOLIC BLOOD PRESSURE: 79 MMHG | OXYGEN SATURATION: 97 %

## 2018-05-23 DIAGNOSIS — B07.0 PLANTAR WART, LEFT FOOT: Primary | ICD-10-CM

## 2018-05-23 PROCEDURE — 99283 EMERGENCY DEPT VISIT LOW MDM: CPT

## 2018-05-23 RX ORDER — ACETAMINOPHEN 325 MG/1
650 TABLET ORAL
Status: DISCONTINUED | OUTPATIENT
Start: 2018-05-23 | End: 2018-05-23 | Stop reason: HOSPADM

## 2018-05-23 RX ORDER — LIDOCAINE 40 MG/G
CREAM TOPICAL
Qty: 5 G | Refills: 0 | Status: SHIPPED | OUTPATIENT
Start: 2018-05-23 | End: 2018-05-31

## 2018-05-23 NOTE — ED NOTES
"Comes to the ED with c/o left foot pain.  Pt does not know what medications she takes at home"I just take what I have".    LOC: The patient is awake, alert, and oriented to place, time, situation. Affect is appropriate.  Speech is appropriate and clear.     APPEARANCE: Patient resting comfortably in no acute distress.  Patient is clean and well groomed.    SKIN: The skin is warm and dry; color consistent with ethnicity.  Patient has normal skin turgor and moist mucus membranes.  Skin intact; no breakdown or bruising noted.     MUSCULOSKELETAL: Patient moving upper and lower extremities without difficulty.  Denies weakness. Unable to weight bear on left foot.     RESPIRATORY: Airway is open and patent. Respirations spontaneous, even, easy, and non-labored.  Patient has a normal effort and rate.  No accessory muscle use noted. Denies cough.     CARDIAC:   No peripheral edema noted. No complaints of chest pain.      ABDOMEN: Soft and non tender to palpation.  No distention noted.     NEUROLOGIC: Eyes open spontaneously.  Behavior appropriate to situation.  Follows commands; facial expression symmetrical.  Purposeful motor response noted; normal sensation in all extremities.        "

## 2018-05-23 NOTE — ED PROVIDER NOTES
"Encounter Date: 5/23/2018    SCRIBE #1 NOTE: I, River Herring, am scribing for, and in the presence of,  Dr. Metcalf. I have scribed the following portions of the note -       History     Chief Complaint   Patient presents with    Foot Swelling     states began about 2 weeks ago. Reports "white spot" at the bottom of left foot. Denies stepping on anything.      Time patient was seen by the provider: 2:18 PM      The patient is a 67 y.o. female with co-morbidities including: CAD, HTN, DM II, diabetic neuropathy  who presents to the ED with a complaint of L foot pain x 2 weeks. Pt reports acute on chronic L sided heel pain. She denies fever or chills but notes mild nausea.          Review of patient's allergies indicates:   Allergen Reactions    Epinephrine     Diltiazem Rash and Other (See Comments)     Skin peels    Morphine Rash     Past Medical History:   Diagnosis Date    Anticoagulant long-term use     Arthritis     Blood transfusion     Coronary artery disease     Diabetes mellitus     Diabetes mellitus, type 2     Diabetic retinopathy     Disorder of kidney and ureter     H/O coronary angiogram     Hypertension     Liver cancer     Lung metastases 12/5/2017    MI (myocardial infarction)     Nausea 11/21/2017    Neuroendocrine carcinoma, 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    PDR (proliferative diabetic retinopathy)     Secondary malignant carcinoid tumor of liver     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     Past Surgical History:   Procedure Laterality Date    ABDOMINAL SURGERY      CARDIAC CATHETERIZATION      COLECTOMY      r/t bowel injury from hysterectomy    COLONOSCOPY      CYST REMOVAL      soft tissue on forehead    EYE SURGERY      HERNIA REPAIR      hiatal hernia    HYSTERECTOMY      LIVER BIOPSY  07/2017     Family History   Problem Relation Age of Onset    Mental illness Mother     Diabetes Father     Cancer Father         pancreatic    Diabetes Sister "     Stomach cancer Sister     Cancer Sister         gastric    Kidney disease Brother     Diabetes Daughter     No Known Problems Daughter     No Known Problems Son     No Known Problems Son     No Known Problems Brother     No Known Problems Brother     No Known Problems Brother     Cancer Maternal Aunt     Cancer Maternal Aunt      Social History   Substance Use Topics    Smoking status: Never Smoker    Smokeless tobacco: Never Used    Alcohol use No      Comment: Occasionally     Review of Systems   Constitutional: Negative for fever.   Musculoskeletal:        L sided foot pain   Skin:        Possible plantar wart       Physical Exam     Initial Vitals [05/23/18 1327]   BP Pulse Resp Temp SpO2   (!) 174/79 88 18 98.6 °F (37 °C) 97 %      MAP       110.67         Physical Exam    Nursing note and vitals reviewed.  Constitutional: She appears well-developed and well-nourished. She is not diaphoretic. No distress.   HENT:   Head: Normocephalic and atraumatic.   Mouth/Throat: Oropharynx is clear and moist.   Cardiovascular:   Pulses:       Dorsalis pedis pulses are 2+ on the left side.   Musculoskeletal: She exhibits tenderness. She exhibits no edema.   L plantar wart w/ some mild tenderness surrounding the area.  No erythema, no fluctuance, or induration   Neurological: She is alert and oriented to person, place, and time. She has normal strength. No cranial nerve deficit or sensory deficit.   Skin: Skin is dry. No rash noted. No erythema.         ED Course   Procedures  Labs Reviewed - No data to display          Medical Decision Making:   History:   Old Medical Records: I decided to obtain old medical records.  Initial Assessment:   Urgent evaluation of 66 yo female w/ L foot pain. There appears to be a plantar wart w/ mild tenderness of the surrounding area. No evidence of underlying infection. DP pulses intact. I do not appreciate either. Pt stable for DC to follow up with podiatry              Scribe Attestation:   Scribe #1: I performed the above scribed service and the documentation accurately describes the services I performed. I attest to the accuracy of the note.    Attending Attestation:           Physician Attestation for Scribe:      Comments: I, Dr. Lisa Metcalf, personally performed the services described in this documentation. All medical record entries made by the scribe were at my direction and in my presence.  I have reviewed the chart and agree that the record reflects my personal performance and is accurate and complete. Lisa Metcalf MD.                 Clinical Impression:   The encounter diagnosis was Plantar wart, left foot.    Disposition:   Disposition: Discharged  Condition: Stable                        Lisa Metcalf MD  05/24/18 0612

## 2018-05-31 ENCOUNTER — INITIAL CONSULT (OUTPATIENT)
Dept: INTERVENTIONAL RADIOLOGY/VASCULAR | Facility: CLINIC | Age: 68
End: 2018-05-31
Payer: MEDICARE

## 2018-05-31 VITALS
DIASTOLIC BLOOD PRESSURE: 91 MMHG | SYSTOLIC BLOOD PRESSURE: 189 MMHG | HEIGHT: 59 IN | BODY MASS INDEX: 29.03 KG/M2 | HEART RATE: 92 BPM | WEIGHT: 144 LBS

## 2018-05-31 DIAGNOSIS — C7B.8 SECONDARY NEUROENDOCRINE TUMOR OF LIVER: Primary | ICD-10-CM

## 2018-05-31 PROCEDURE — 3080F DIAST BP >= 90 MM HG: CPT | Mod: CPTII,S$GLB,, | Performed by: FAMILY MEDICINE

## 2018-05-31 PROCEDURE — 99999 PR PBB SHADOW E&M-EST. PATIENT-LVL III: CPT | Mod: PBBFAC,,,

## 2018-05-31 PROCEDURE — 3008F BODY MASS INDEX DOCD: CPT | Mod: CPTII,S$GLB,, | Performed by: FAMILY MEDICINE

## 2018-05-31 PROCEDURE — 99214 OFFICE O/P EST MOD 30 MIN: CPT | Mod: S$GLB,,, | Performed by: FAMILY MEDICINE

## 2018-05-31 PROCEDURE — 3077F SYST BP >= 140 MM HG: CPT | Mod: CPTII,S$GLB,, | Performed by: FAMILY MEDICINE

## 2018-05-31 NOTE — PROGRESS NOTES
"Subjective:       Patient ID: Lala Ames is a 67 y.o. female.    Chief Complaint: Cancer    Patient here to discuss treatment of a secondary neuroendocrine tumor of the liver. She reports feeling "fine." She was first diagnosed in 7/2017. She tells me she started, but did not complete chemotherapy because of the way it made her feel. Her  tells me that she would vomit almost immediately after taking her medication. She has been reviewed in the neuroendocrine tumor multidisciplinary tumor board.      Review of Systems   Constitutional: Negative for activity change, appetite change, chills, fatigue and fever.   HENT: Positive for trouble swallowing. Negative for congestion, drooling, ear discharge, postnasal drip and sneezing.    Eyes: Negative for pain, discharge, redness and itching.   Respiratory: Positive for shortness of breath (sometimes i have a heaviness in my chest). Negative for cough, wheezing and stridor.    Cardiovascular: Negative for chest pain, palpitations and leg swelling.   Gastrointestinal: Positive for constipation (occasional) and nausea (in the morning). Negative for abdominal distention, abdominal pain, rectal pain and vomiting.   Genitourinary: Negative for difficulty urinating, dysuria, frequency and urgency.   Musculoskeletal: Negative for arthralgias, back pain, gait problem, joint swelling and myalgias.        Plantar fasciitis; shot alleviated   Skin: Negative for color change, pallor and rash.   Neurological: Negative for dizziness, weakness and headaches.       Objective:      Physical Exam   Constitutional: She is oriented to person, place, and time. She appears well-developed and well-nourished. No distress.   HENT:   Head: Normocephalic and atraumatic.   Right Ear: External ear normal.   Left Ear: External ear normal.   Nose: Nose normal.   Mouth/Throat: Oropharynx is clear and moist. No oropharyngeal exudate.   Eyes: Conjunctivae are normal. Pupils are equal, " round, and reactive to light. Right eye exhibits no discharge. Left eye exhibits no discharge. No scleral icterus.   Neck: Neck supple. No tracheal deviation present. No thyromegaly present.   Cardiovascular: Normal rate, regular rhythm, normal heart sounds and intact distal pulses.  Exam reveals no gallop and no friction rub.    No murmur heard.  Pulmonary/Chest: Effort normal and breath sounds normal. No stridor. No respiratory distress. She has no wheezes. She has no rales.   Abdominal: Soft. Bowel sounds are normal. She exhibits no distension and no mass. There is no hepatosplenomegaly. There is tenderness in the right upper quadrant and epigastric area. There is no guarding.   Musculoskeletal: She exhibits no edema.   Lymphadenopathy:     She has no cervical adenopathy.   Neurological: She is alert and oriented to person, place, and time. Gait normal.   Skin: Skin is warm and dry. She is not diaphoretic. No cyanosis. Nails show no clubbing.   Psychiatric: She has a normal mood and affect.   Vitals reviewed.      Assessment:       1. Secondary neuroendocrine tumor of liver        Plan:         Explained recommendation of tumor board is to treat with chemoembolization. Chemoembolization procedure discussed in detail with the patient including risks, benefits, potential complications, usual pre and post procedure course, as well as potential to develop post-embolization syndrome. Utilized images from the internet and illustrations to help explain procedure. Patient verbalized understanding and agreement. Dr. Madden in room. All questions answered. Patient tells us she does not want to proceed with treatment. Clinic phone number provided should she change her mind or have any questions. Patient also tells me her children (Kathy Ragsdale, Yves Ragsdale, Taya Ames) may call to ask questions, and it is ok for me to disclose what we have discussed today. I am happy to answer any questions her children  may have.

## 2018-06-01 ENCOUNTER — TELEPHONE (OUTPATIENT)
Dept: INTERVENTIONAL RADIOLOGY/VASCULAR | Facility: HOSPITAL | Age: 68
End: 2018-06-01

## 2018-06-01 NOTE — TELEPHONE ENCOUNTER
Returned phone call from patient's daughter-in-law, Kathy Ragsdale. She wanted to know more information regarding locoregional treatment option. Chemoembolization procedure discussed in detail including risks, benefits, potential complications, usual pre and post procedure course, as well as potential to develop post-embolization syndrome. Verbalized understanding.

## 2018-06-01 NOTE — TELEPHONE ENCOUNTER
Spoke with patient's nephew, Yves Ragsdale. H wanted to know more information regarding locoregional treatment option. Chemoembolization procedure discussed in detail including risks, benefits, potential complications, usual pre and post procedure course, as well as potential to develop post-embolization syndrome. Verbalized understanding

## 2018-06-05 ENCOUNTER — TELEPHONE (OUTPATIENT)
Dept: INTERVENTIONAL RADIOLOGY/VASCULAR | Facility: CLINIC | Age: 68
End: 2018-06-05

## 2018-06-05 NOTE — TELEPHONE ENCOUNTER
Returned patient's phone call. She tells me after discussing with her family, she has changed her mind and would like to go through with the chemoembolization. We will call her with availability. She asks that the procedure not be scheduled until after June 20th.

## 2018-06-07 DIAGNOSIS — C7B.8 SECONDARY NEUROENDOCRINE TUMOR OF LIVER: Primary | ICD-10-CM

## 2018-06-28 DIAGNOSIS — C7B.8 SECONDARY NEUROENDOCRINE TUMOR OF LIVER: Primary | ICD-10-CM

## 2018-06-29 ENCOUNTER — HOSPITAL ENCOUNTER (OUTPATIENT)
Facility: HOSPITAL | Age: 68
Discharge: HOME | End: 2018-06-30
Attending: RADIOLOGY | Admitting: RADIOLOGY
Payer: MEDICARE

## 2018-06-29 ENCOUNTER — SURGERY (OUTPATIENT)
Age: 68
End: 2018-06-29

## 2018-06-29 DIAGNOSIS — R07.9 CHEST PAIN: ICD-10-CM

## 2018-06-29 DIAGNOSIS — C7B.8 SECONDARY NEUROENDOCRINE TUMOR OF LIVER: ICD-10-CM

## 2018-06-29 DIAGNOSIS — D3A.8 NEUROENDOCRINE TUMOR: ICD-10-CM

## 2018-06-29 LAB
ESTIMATED AVG GLUCOSE: 105 MG/DL
HBA1C MFR BLD HPLC: 5.3 %
POCT GLUCOSE: 165 MG/DL (ref 70–110)
POCT GLUCOSE: 69 MG/DL (ref 70–110)
POCT GLUCOSE: 79 MG/DL (ref 70–110)
TROPONIN I SERPL DL<=0.01 NG/ML-MCNC: <0.006 NG/ML

## 2018-06-29 PROCEDURE — 84484 ASSAY OF TROPONIN QUANT: CPT

## 2018-06-29 PROCEDURE — 25000003 PHARM REV CODE 250: Performed by: STUDENT IN AN ORGANIZED HEALTH CARE EDUCATION/TRAINING PROGRAM

## 2018-06-29 PROCEDURE — 25000003 PHARM REV CODE 250: Performed by: FAMILY MEDICINE

## 2018-06-29 PROCEDURE — 93010 ELECTROCARDIOGRAM REPORT: CPT | Mod: ,,, | Performed by: INTERNAL MEDICINE

## 2018-06-29 PROCEDURE — 82962 GLUCOSE BLOOD TEST: CPT

## 2018-06-29 PROCEDURE — G0378 HOSPITAL OBSERVATION PER HR: HCPCS

## 2018-06-29 PROCEDURE — 83036 HEMOGLOBIN GLYCOSYLATED A1C: CPT

## 2018-06-29 PROCEDURE — 63600175 PHARM REV CODE 636 W HCPCS: Performed by: FAMILY MEDICINE

## 2018-06-29 PROCEDURE — 63600175 PHARM REV CODE 636 W HCPCS: Performed by: RADIOLOGY

## 2018-06-29 PROCEDURE — 93005 ELECTROCARDIOGRAM TRACING: CPT

## 2018-06-29 PROCEDURE — 63600175 PHARM REV CODE 636 W HCPCS: Performed by: STUDENT IN AN ORGANIZED HEALTH CARE EDUCATION/TRAINING PROGRAM

## 2018-06-29 PROCEDURE — 36415 COLL VENOUS BLD VENIPUNCTURE: CPT

## 2018-06-29 PROCEDURE — 25500020 PHARM REV CODE 255: Performed by: RADIOLOGY

## 2018-06-29 PROCEDURE — 99219 PR INITIAL OBSERVATION CARE,LEVL II: CPT | Mod: ,,, | Performed by: INTERNAL MEDICINE

## 2018-06-29 RX ORDER — ONDANSETRON 2 MG/ML
INJECTION INTRAMUSCULAR; INTRAVENOUS CODE/TRAUMA/SEDATION MEDICATION
Status: COMPLETED | OUTPATIENT
Start: 2018-06-29 | End: 2018-06-29

## 2018-06-29 RX ORDER — HYDRALAZINE HYDROCHLORIDE 20 MG/ML
10 INJECTION INTRAMUSCULAR; INTRAVENOUS EVERY 6 HOURS PRN
Status: DISCONTINUED | OUTPATIENT
Start: 2018-06-29 | End: 2018-06-29

## 2018-06-29 RX ORDER — DIPHENHYDRAMINE HYDROCHLORIDE 50 MG/ML
50 INJECTION INTRAMUSCULAR; INTRAVENOUS ONCE
Status: COMPLETED | OUTPATIENT
Start: 2018-06-29 | End: 2018-06-29

## 2018-06-29 RX ORDER — RAMELTEON 8 MG/1
8 TABLET ORAL NIGHTLY PRN
Status: DISCONTINUED | OUTPATIENT
Start: 2018-06-29 | End: 2018-06-30 | Stop reason: HOSPADM

## 2018-06-29 RX ORDER — ONDANSETRON 4 MG/1
4 TABLET, FILM COATED ORAL EVERY 6 HOURS PRN
Status: DISCONTINUED | OUTPATIENT
Start: 2018-06-29 | End: 2018-06-29

## 2018-06-29 RX ORDER — FENTANYL CITRATE 50 UG/ML
50 INJECTION, SOLUTION INTRAMUSCULAR; INTRAVENOUS
Status: DISCONTINUED | OUTPATIENT
Start: 2018-06-29 | End: 2018-06-29 | Stop reason: HOSPADM

## 2018-06-29 RX ORDER — OXYCODONE HYDROCHLORIDE 5 MG/1
10 TABLET ORAL
Status: DISCONTINUED | OUTPATIENT
Start: 2018-06-29 | End: 2018-06-30 | Stop reason: HOSPADM

## 2018-06-29 RX ORDER — SODIUM CHLORIDE 0.9 % (FLUSH) 0.9 %
5 SYRINGE (ML) INJECTION
Status: DISCONTINUED | OUTPATIENT
Start: 2018-06-29 | End: 2018-06-30 | Stop reason: HOSPADM

## 2018-06-29 RX ORDER — SODIUM CHLORIDE 9 MG/ML
INJECTION, SOLUTION INTRAVENOUS CONTINUOUS
Status: DISCONTINUED | OUTPATIENT
Start: 2018-06-29 | End: 2018-06-30 | Stop reason: HOSPADM

## 2018-06-29 RX ORDER — OXYCODONE HYDROCHLORIDE 5 MG/1
5 TABLET ORAL EVERY 6 HOURS PRN
Qty: 20 TABLET | Refills: 0 | Status: SHIPPED | OUTPATIENT
Start: 2018-06-29

## 2018-06-29 RX ORDER — AMOXICILLIN AND CLAVULANATE POTASSIUM 500; 125 MG/1; MG/1
1 TABLET, FILM COATED ORAL 2 TIMES DAILY
Qty: 14 TABLET | Refills: 0 | Status: SHIPPED | OUTPATIENT
Start: 2018-06-29 | End: 2018-07-03 | Stop reason: ALTCHOICE

## 2018-06-29 RX ORDER — ONDANSETRON 4 MG/1
4 TABLET, ORALLY DISINTEGRATING ORAL EVERY 6 HOURS PRN
Qty: 30 TABLET | Refills: 0 | Status: SHIPPED | OUTPATIENT
Start: 2018-06-29

## 2018-06-29 RX ORDER — HYDRALAZINE HYDROCHLORIDE 20 MG/ML
10 INJECTION INTRAMUSCULAR; INTRAVENOUS EVERY 8 HOURS PRN
Status: DISCONTINUED | OUTPATIENT
Start: 2018-06-29 | End: 2018-06-30

## 2018-06-29 RX ORDER — LABETALOL HYDROCHLORIDE 5 MG/ML
10 INJECTION, SOLUTION INTRAVENOUS EVERY 4 HOURS PRN
Status: DISCONTINUED | OUTPATIENT
Start: 2018-06-29 | End: 2018-06-29

## 2018-06-29 RX ORDER — PROMETHAZINE HYDROCHLORIDE 12.5 MG/1
12.5 TABLET ORAL EVERY 6 HOURS PRN
Status: DISCONTINUED | OUTPATIENT
Start: 2018-06-29 | End: 2018-06-30 | Stop reason: HOSPADM

## 2018-06-29 RX ORDER — GLUCAGON 1 MG
1 KIT INJECTION
Status: DISCONTINUED | OUTPATIENT
Start: 2018-06-29 | End: 2018-06-30 | Stop reason: HOSPADM

## 2018-06-29 RX ORDER — INSULIN ASPART 100 [IU]/ML
0-5 INJECTION, SOLUTION INTRAVENOUS; SUBCUTANEOUS
Status: DISCONTINUED | OUTPATIENT
Start: 2018-06-29 | End: 2018-06-30 | Stop reason: HOSPADM

## 2018-06-29 RX ORDER — LABETALOL HYDROCHLORIDE 5 MG/ML
10 INJECTION, SOLUTION INTRAVENOUS EVERY 6 HOURS PRN
Status: DISCONTINUED | OUTPATIENT
Start: 2018-06-29 | End: 2018-06-30 | Stop reason: HOSPADM

## 2018-06-29 RX ORDER — HEPARIN SODIUM 200 [USP'U]/100ML
500 INJECTION, SOLUTION INTRAVENOUS CONTINUOUS
Status: DISCONTINUED | OUTPATIENT
Start: 2018-06-29 | End: 2018-06-30 | Stop reason: HOSPADM

## 2018-06-29 RX ORDER — FENTANYL CITRATE 50 UG/ML
50 INJECTION, SOLUTION INTRAMUSCULAR; INTRAVENOUS ONCE
Status: COMPLETED | OUTPATIENT
Start: 2018-06-29 | End: 2018-06-29

## 2018-06-29 RX ORDER — MIDAZOLAM HYDROCHLORIDE 1 MG/ML
1 INJECTION INTRAMUSCULAR; INTRAVENOUS
Status: DISCONTINUED | OUTPATIENT
Start: 2018-06-29 | End: 2018-06-29 | Stop reason: HOSPADM

## 2018-06-29 RX ORDER — FENTANYL CITRATE 50 UG/ML
INJECTION, SOLUTION INTRAMUSCULAR; INTRAVENOUS CODE/TRAUMA/SEDATION MEDICATION
Status: COMPLETED | OUTPATIENT
Start: 2018-06-29 | End: 2018-06-29

## 2018-06-29 RX ORDER — SODIUM CHLORIDE 9 MG/ML
INJECTION, SOLUTION INTRAVENOUS CONTINUOUS
Status: ACTIVE | OUTPATIENT
Start: 2018-06-29 | End: 2018-06-29

## 2018-06-29 RX ORDER — ONDANSETRON 8 MG/1
8 TABLET, ORALLY DISINTEGRATING ORAL EVERY 8 HOURS PRN
Status: DISCONTINUED | OUTPATIENT
Start: 2018-06-29 | End: 2018-06-29

## 2018-06-29 RX ORDER — AMLODIPINE BESYLATE 10 MG/1
10 TABLET ORAL DAILY
Status: DISCONTINUED | OUTPATIENT
Start: 2018-06-29 | End: 2018-06-30 | Stop reason: HOSPADM

## 2018-06-29 RX ORDER — ONDANSETRON 8 MG/1
8 TABLET, ORALLY DISINTEGRATING ORAL EVERY 6 HOURS PRN
Status: DISCONTINUED | OUTPATIENT
Start: 2018-06-29 | End: 2018-06-30 | Stop reason: HOSPADM

## 2018-06-29 RX ORDER — LIDOCAINE HYDROCHLORIDE 10 MG/ML
1 INJECTION, SOLUTION EPIDURAL; INFILTRATION; INTRACAUDAL; PERINEURAL ONCE AS NEEDED
Status: COMPLETED | OUTPATIENT
Start: 2018-06-29 | End: 2018-06-29

## 2018-06-29 RX ORDER — HYDROMORPHONE HYDROCHLORIDE 1 MG/ML
1 INJECTION, SOLUTION INTRAMUSCULAR; INTRAVENOUS; SUBCUTANEOUS EVERY 6 HOURS PRN
Status: DISCONTINUED | OUTPATIENT
Start: 2018-06-29 | End: 2018-06-30 | Stop reason: HOSPADM

## 2018-06-29 RX ORDER — IBUPROFEN 200 MG
16 TABLET ORAL
Status: DISCONTINUED | OUTPATIENT
Start: 2018-06-29 | End: 2018-06-30 | Stop reason: HOSPADM

## 2018-06-29 RX ORDER — AMOXICILLIN AND CLAVULANATE POTASSIUM 500; 125 MG/1; MG/1
1 TABLET, FILM COATED ORAL 2 TIMES DAILY
Status: DISCONTINUED | OUTPATIENT
Start: 2018-06-29 | End: 2018-06-30 | Stop reason: HOSPADM

## 2018-06-29 RX ORDER — IBUPROFEN 200 MG
24 TABLET ORAL
Status: DISCONTINUED | OUTPATIENT
Start: 2018-06-29 | End: 2018-06-30 | Stop reason: HOSPADM

## 2018-06-29 RX ORDER — MIDAZOLAM HYDROCHLORIDE 1 MG/ML
INJECTION INTRAMUSCULAR; INTRAVENOUS CODE/TRAUMA/SEDATION MEDICATION
Status: COMPLETED | OUTPATIENT
Start: 2018-06-29 | End: 2018-06-29

## 2018-06-29 RX ORDER — HYDRALAZINE HYDROCHLORIDE 20 MG/ML
10 INJECTION INTRAMUSCULAR; INTRAVENOUS ONCE
Status: COMPLETED | OUTPATIENT
Start: 2018-06-29 | End: 2018-06-29

## 2018-06-29 RX ORDER — HYDROMORPHONE HYDROCHLORIDE 1 MG/ML
0.5 INJECTION, SOLUTION INTRAMUSCULAR; INTRAVENOUS; SUBCUTANEOUS EVERY 6 HOURS PRN
Status: DISCONTINUED | OUTPATIENT
Start: 2018-06-29 | End: 2018-06-29

## 2018-06-29 RX ORDER — ONDANSETRON 2 MG/ML
4 INJECTION INTRAMUSCULAR; INTRAVENOUS ONCE
Status: COMPLETED | OUTPATIENT
Start: 2018-06-29 | End: 2018-06-29

## 2018-06-29 RX ADMIN — HYDROMORPHONE HYDROCHLORIDE 0.5 MG: 1 INJECTION, SOLUTION INTRAMUSCULAR; INTRAVENOUS; SUBCUTANEOUS at 04:06

## 2018-06-29 RX ADMIN — LIDOCAINE HYDROCHLORIDE 0.1 MG: 10 INJECTION, SOLUTION EPIDURAL; INFILTRATION; INTRACAUDAL; PERINEURAL at 09:06

## 2018-06-29 RX ADMIN — Medication 1 MG: at 09:06

## 2018-06-29 RX ADMIN — ONDANSETRON 4 MG: 2 INJECTION INTRAMUSCULAR; INTRAVENOUS at 12:06

## 2018-06-29 RX ADMIN — FENTANYL CITRATE 50 MCG: 50 INJECTION, SOLUTION INTRAMUSCULAR; INTRAVENOUS at 01:06

## 2018-06-29 RX ADMIN — ONDANSETRON 8 MG: 8 TABLET, ORALLY DISINTEGRATING ORAL at 02:06

## 2018-06-29 RX ADMIN — OCTREOTIDE ACETATE 500 MCG/HR: 1000 INJECTION, SOLUTION INTRAVENOUS; SUBCUTANEOUS at 10:06

## 2018-06-29 RX ADMIN — MIDAZOLAM HYDROCHLORIDE 0.5 MG: 1 INJECTION, SOLUTION INTRAMUSCULAR; INTRAVENOUS at 11:06

## 2018-06-29 RX ADMIN — OXYCODONE HYDROCHLORIDE 10 MG: 5 TABLET ORAL at 02:06

## 2018-06-29 RX ADMIN — SODIUM CHLORIDE: 0.9 INJECTION, SOLUTION INTRAVENOUS at 10:06

## 2018-06-29 RX ADMIN — FENTANYL CITRATE 50 MCG: 50 INJECTION, SOLUTION INTRAMUSCULAR; INTRAVENOUS at 12:06

## 2018-06-29 RX ADMIN — HYDROCORTISONE SODIUM SUCCINATE 200 MG: 100 INJECTION, POWDER, FOR SOLUTION INTRAMUSCULAR; INTRAVENOUS at 10:06

## 2018-06-29 RX ADMIN — DIPHENHYDRAMINE HYDROCHLORIDE 50 MG: 50 INJECTION, SOLUTION INTRAMUSCULAR; INTRAVENOUS at 10:06

## 2018-06-29 RX ADMIN — IOHEXOL 55 ML: 300 INJECTION, SOLUTION INTRAVENOUS at 12:06

## 2018-06-29 RX ADMIN — ONDANSETRON 4 MG: 2 INJECTION, SOLUTION INTRAMUSCULAR; INTRAVENOUS at 10:06

## 2018-06-29 RX ADMIN — LABETALOL HYDROCHLORIDE 10 MG: 5 INJECTION, SOLUTION INTRAVENOUS at 08:06

## 2018-06-29 RX ADMIN — DOXORUBICIN HYDROCHLORIDE 50 MG: 2 INJECTION, SOLUTION INTRAVENOUS at 12:06

## 2018-06-29 RX ADMIN — SODIUM CHLORIDE 3 G: 9 INJECTION, SOLUTION INTRAVENOUS at 10:06

## 2018-06-29 RX ADMIN — FENTANYL CITRATE 25 MCG: 50 INJECTION, SOLUTION INTRAMUSCULAR; INTRAVENOUS at 11:06

## 2018-06-29 RX ADMIN — AMOXICILLIN AND CLAVULANATE POTASSIUM 500 MG: 500; 125 TABLET, FILM COATED ORAL at 09:06

## 2018-06-29 RX ADMIN — MIDAZOLAM HYDROCHLORIDE 1 MG: 1 INJECTION, SOLUTION INTRAMUSCULAR; INTRAVENOUS at 12:06

## 2018-06-29 RX ADMIN — ALUMINUM HYDROXIDE, MAGNESIUM HYDROXIDE, AND SIMETHICONE 50 ML: 200; 200; 20 SUSPENSION ORAL at 07:06

## 2018-06-29 RX ADMIN — OCTREOTIDE ACETATE 125 MCG/HR: 1000 INJECTION, SOLUTION INTRAVENOUS; SUBCUTANEOUS at 06:06

## 2018-06-29 RX ADMIN — HYDRALAZINE HYDROCHLORIDE 10 MG: 20 INJECTION INTRAMUSCULAR; INTRAVENOUS at 01:06

## 2018-06-29 NOTE — PROGRESS NOTES
Spoke to Resident with IR and informed him of pt c/o severe pain to abdomen radiating to back and chest. Said they will come and see pt.

## 2018-06-29 NOTE — PROGRESS NOTES
Pt arrived to [facility], bay 3. Report received from JAMAR Graves. Dressing to groin dry and intact. Family at bedside.

## 2018-06-29 NOTE — PROGRESS NOTES
Went to check on patient and she said was having persistent epigastric pain. Patient had moderate pain while pushing on her abdomen, an often expected finding after embolization procedure. She did describe that having some pain radiating to her lower chest so I ordered stat EKG and troponin and consulted IM consult team T, Dr. Robbins. Will await their recommendations.     Went back to check on patient an hour later and she described still having pain and after quick discussion with family patient had fallen asleep.     Tadeo Woods MD  PGY - 2  Department of Radiology

## 2018-06-29 NOTE — PROCEDURES
Radiology Post-Procedure Note    Pre Op Diagnosis: Metastatic Neuroendocrine tumor    Post Op Diagnosis: Same    Procedure: Chemoembolization with doxorubicin to right hepatic lobe    Procedure Performed by: Florencio Madden MD    Written Informed Consent Obtained: Yes    Specimen Removed: None    Estimated Blood Loss: Minimal    Findings:     After placement of a right femoral artery sheath, a 5-Bulgarian catheter was inserted and angiography of the celiac artery for anatomic evaluation and localization of hepatic tumor.  A microcatheter was introduced into feeding arteries of a right hepatic lobe tumor and LC beads coated with doxorubicin were injected until 50 mg were administered.  Post procedural angiography revealed no complications.    Right femoral artery angiogram was performed and the sheath was removed.  Hemostasis was achieved using exoseal 5Fr technique.  There was no hematoma at the time of hemostasis.    The patient tolerated procedure well.  Please see Imaging report for further details.    Tadeo Woods MD  PGY - 2  Department of Radiology

## 2018-06-29 NOTE — PROGRESS NOTES
TACE completed, pt tolerated well. No apparent distress noted. Exoseal deployed, hemostasis achieved, HOB to be elevated at 14:23. Dressing applied CDI. Pt to be transferred to [facility], report to be given at bedside.

## 2018-06-29 NOTE — H&P
Radiology History & Physical      SUBJECTIVE:     Chief Complaint: NET with diffuse mets    History of Present Illness:  Lala Ames is a 67 y.o. 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 Tumor board decided to treat with TACE to the liver. Patient has poor renal function. 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       Past Medical History:   Diagnosis Date    Anticoagulant long-term use     Arthritis     Blood transfusion     Coronary artery disease     Diabetes mellitus     Diabetes mellitus, type 2     Diabetic retinopathy     Disorder of kidney and ureter     H/O coronary angiogram     Hypertension     Liver cancer     Lung metastases 12/5/2017    MI (myocardial infarction)     x3    Nausea 11/21/2017    Neuroendocrine carcinoma, 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    PDR (proliferative diabetic retinopathy)     Secondary malignant carcinoid tumor of liver     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     Past Surgical History:   Procedure Laterality Date    ABDOMINAL SURGERY      bowel had to be repaired after hysterectomy    CARDIAC CATHETERIZATION      COLECTOMY      r/t bowel injury from hysterectomy    COLONOSCOPY      CYST REMOVAL      soft tissue on forehead    EYE SURGERY      HERNIA REPAIR      x2    HYSTERECTOMY      LIVER BIOPSY  07/2017       Home Meds:   Prior to Admission medications    Medication Sig Start Date End Date Taking? Authorizing Provider   amlodipine (NORVASC) 10 MG tablet Take 1 tablet (10 mg total) by mouth once daily. 7/27/17 7/27/18 Yes Moo Elizabeth MD   aspirin (ECOTRIN) 81 MG EC tablet Take 1 tablet (81 mg total) by mouth once daily. 11/9/16  Yes Víctor Sheets MD   blood sugar diagnostic (BLOOD GLUCOSE TEST) Strp 1 strip by Misc.(Non-Drug; Combo Route) route 3 (three) times daily as needed. 4/26/16  Yes Nidia Melendez,  NP   blood-glucose meter (FREESTYLE SYSTEM KIT) kit 1 each by Other route 2 (two) times daily. Use as instructed 2/7/18  Yes Ndiia Melendez NP   ergocalciferol (ERGOCALCIFEROL) 50,000 unit Cap Take 1 capsule (50,000 Units total) by mouth every 7 days. 11/9/16  Yes Víctor Sheets MD   lancets (ONETOUCH ULTRASOFT LANCETS) Misc 1 each by Misc.(Non-Drug; Combo Route) route 2 (two) times daily as needed. 2/7/18  Yes Nidia Melendez NP   nitroGLYCERIN (NITROSTAT) 0.4 MG SL tablet Place 1 tablet (0.4 mg total) under the tongue every 5 (five) minutes as needed for Chest pain. 5/11/17 5/11/18  Víctor hSeets MD   ondansetron (ZOFRAN) 4 MG tablet Take 1 tablet (4 mg total) by mouth every 6 (six) hours as needed for Nausea. 1/11/18   Buck Davison MD   promethazine (PHENERGAN) 12.5 MG Supp Place 1 suppository (12.5 mg total) rectally every 6 (six) hours as needed. 11/21/17   Doyle Dorantes DO, FACP   ranolazine (RANEXA) 500 MG Tb12 Take 1 tablet (500 mg total) by mouth 2 (two) times daily. 6/13/18 6/13/19  Yang Child NP     Anticoagulants/Antiplatelets: ASA 81mg last dose.    Allergies:   Review of patient's allergies indicates:   Allergen Reactions    Epinephrine     Diltiazem Rash and Other (See Comments)     Skin peels    Morphine Rash     Sedation History:  no adverse reactions    Review of Systems:   Hematological: no known coagulopathies  Respiratory: no shortness of breath  Cardiovascular: no chest pain  Gastrointestinal: no abdominal pain  Genito-Urinary: no dysuria  Musculoskeletal: negative  Neurological: no TIA or stroke symptoms         OBJECTIVE:     Vital Signs (Most Recent)  Temp: 98.6 °F (37 °C) (06/29/18 0942)  Pulse: 86 (06/29/18 0942)  Resp: 18 (06/29/18 0942)  BP: (!) 157/64 (06/29/18 0942)  SpO2: 99 % (06/29/18 0942)    Physical Exam:  ASA: 3  Mallampati: 2    General: no acute distress  Mental Status: alert and oriented to person, place and time  HEENT: normocephalic,  atraumatic  Chest: unlabored breathing  Abdomen: nondistended  Extremity: moves all extremities    Laboratory  Lab Results   Component Value Date    INR 1.0 06/29/2018       Lab Results   Component Value Date    WBC 9.85 06/29/2018    HGB 12.4 06/29/2018    HCT 37.9 06/29/2018    MCV 88 06/29/2018     06/29/2018      Lab Results   Component Value Date    GLU 82 06/29/2018     06/29/2018    K 4.8 06/29/2018     (H) 06/29/2018    CO2 23 06/29/2018    BUN 31 (H) 06/29/2018    CREATININE 1.9 (H) 06/29/2018    CALCIUM 9.5 06/29/2018    MG 1.8 07/27/2017    ALT 31 06/29/2018    AST 40 06/29/2018    ALBUMIN 3.5 06/29/2018    BILITOT 0.8 06/29/2018    BILIDIR 0.4 (H) 06/29/2018       ASSESSMENT/PLAN:     Sedation Plan: Moderate  Patient will undergo TACE to several areas of metastatic disease of NET in the liver.     Patient is on ASA 81mg and last dose was yesterday.     Tadeo Woods MD  PGY - 2  Department of Radiology

## 2018-06-29 NOTE — PLAN OF CARE
Problem: Diabetes, Type 2 (Adult)  Goal: Signs and Symptoms of Listed Potential Problems Will be Absent, Minimized or Managed (Diabetes, Type 2)  Signs and symptoms of listed potential problems will be absent, minimized or managed by discharge/transition of care (reference Diabetes, Type 2 (Adult) CPG).   Outcome: Ongoing (interventions implemented as appropriate)  POC reviewed with pt. Safety precautions maintained. Bed in low position wheels locked. Side rails up x 2. Call light within reach. Pt free of falls.  Pt voids using bedpan. Bg monitored achs.

## 2018-06-30 VITALS
BODY MASS INDEX: 27.21 KG/M2 | TEMPERATURE: 96 F | DIASTOLIC BLOOD PRESSURE: 79 MMHG | WEIGHT: 135 LBS | HEART RATE: 66 BPM | RESPIRATION RATE: 16 BRPM | SYSTOLIC BLOOD PRESSURE: 184 MMHG | OXYGEN SATURATION: 95 % | HEIGHT: 59 IN

## 2018-06-30 LAB
POCT GLUCOSE: 205 MG/DL (ref 70–110)
POCT GLUCOSE: 210 MG/DL (ref 70–110)

## 2018-06-30 PROCEDURE — 82962 GLUCOSE BLOOD TEST: CPT | Mod: 91

## 2018-06-30 PROCEDURE — 25000003 PHARM REV CODE 250: Performed by: STUDENT IN AN ORGANIZED HEALTH CARE EDUCATION/TRAINING PROGRAM

## 2018-06-30 PROCEDURE — G0378 HOSPITAL OBSERVATION PER HR: HCPCS

## 2018-06-30 PROCEDURE — 63600175 PHARM REV CODE 636 W HCPCS: Performed by: STUDENT IN AN ORGANIZED HEALTH CARE EDUCATION/TRAINING PROGRAM

## 2018-06-30 RX ORDER — OXYCODONE HYDROCHLORIDE 10 MG/1
10 TABLET ORAL
Qty: 40 TABLET | Refills: 0 | Status: SHIPPED | OUTPATIENT
Start: 2018-06-30 | End: 2018-06-30

## 2018-06-30 RX ORDER — OXYCODONE HYDROCHLORIDE 10 MG/1
10 TABLET ORAL
Qty: 40 TABLET | Refills: 0 | Status: SHIPPED | OUTPATIENT
Start: 2018-06-30

## 2018-06-30 RX ORDER — AMOXICILLIN 250 MG
1 CAPSULE ORAL 2 TIMES DAILY
Qty: 40 TABLET | Refills: 0 | Status: SHIPPED | OUTPATIENT
Start: 2018-06-30 | End: 2018-06-30

## 2018-06-30 RX ORDER — AMOXICILLIN 250 MG
1 CAPSULE ORAL 2 TIMES DAILY
Qty: 40 TABLET | Refills: 0 | Status: SHIPPED | OUTPATIENT
Start: 2018-06-30 | End: 2018-07-06 | Stop reason: CLARIF

## 2018-06-30 RX ORDER — AMOXICILLIN 250 MG
1 CAPSULE ORAL 2 TIMES DAILY
Status: DISCONTINUED | OUTPATIENT
Start: 2018-06-30 | End: 2018-06-30 | Stop reason: HOSPADM

## 2018-06-30 RX ADMIN — ONDANSETRON 8 MG: 8 TABLET, ORALLY DISINTEGRATING ORAL at 02:06

## 2018-06-30 RX ADMIN — Medication 1 MG: at 09:06

## 2018-06-30 RX ADMIN — AMLODIPINE BESYLATE 10 MG: 10 TABLET ORAL at 09:06

## 2018-06-30 RX ADMIN — DOCUSATE SODIUM AND SENNOSIDES 1 TABLET: 8.6; 5 TABLET, FILM COATED ORAL at 01:06

## 2018-06-30 RX ADMIN — Medication 1 MG: at 03:06

## 2018-06-30 RX ADMIN — OXYCODONE HYDROCHLORIDE 10 MG: 5 TABLET ORAL at 07:06

## 2018-06-30 RX ADMIN — OXYCODONE HYDROCHLORIDE 10 MG: 5 TABLET ORAL at 12:06

## 2018-06-30 RX ADMIN — AMOXICILLIN AND CLAVULANATE POTASSIUM 500 MG: 500; 125 TABLET, FILM COATED ORAL at 09:06

## 2018-06-30 RX ADMIN — LABETALOL HYDROCHLORIDE 10 MG: 5 INJECTION, SOLUTION INTRAVENOUS at 08:06

## 2018-06-30 NOTE — NURSING
Paper prescriptions given to family member who is bringing them to the outpatient pharmacy to fill.

## 2018-06-30 NOTE — NURSING
Discharge instructions and medlist given.  Patient and daughter verbalized understanding.  Escort requested.

## 2018-06-30 NOTE — PLAN OF CARE
Problem: Patient Care Overview  Goal: Plan of Care Review  Outcome: Ongoing (interventions implemented as appropriate)  Pt progressing towards goals.remains on telemetry,NSR. r groin dressing intact with a small amt of serous drainage noted.dp pulses 2+.reports adequate pain management with iv dilaudid.has been exp nausea relieved with Zofran.required iv labetalol for elevated bp.iv Sandostatin in progress.safety precautions maintained.pt free of falls or injuries.continue plan of care.

## 2018-06-30 NOTE — CONSULTS
Hospital Medicine  Consult Note  Reason for Consult: Chest pain  HPI:    67F h/o neuroendocrine tumor with mets to liver, lungs, CAD (3x MI, no stents), HTN, DM2, CKD was admitted s/p TACE procedure - chemoemboliation with doxorubicin to R hepatic lobe by IR. Per patient 30 minutes after procedure began to have severe abdominal pain, with some radiation to chest, and back, constant, sharp, has been minimally relieved by pain medication, exacerbated by sitting up, denies dyspnea, reports feeling hot as well. Pt denies recent BM (last yesterday), reports no palpitations. Medicine was consulted as patient had reported chest pain and has h/o CAD.     Past Medical History: Patient has a past medical history of Anticoagulant long-term use; Arthritis; Blood transfusion; Coronary artery disease; Diabetes mellitus; Diabetes mellitus, type 2; Diabetic retinopathy; Disorder of kidney and ureter; H/O coronary angiogram; Hypertension; Liver cancer; Lung metastases (12/5/2017); MI (myocardial infarction); Nausea (11/21/2017); Neuroendocrine carcinoma, unknown primary site (11/21/2017); PDR (proliferative diabetic retinopathy); Secondary malignant carcinoid tumor of liver; and Secondary neuroendocrine tumor of liver (11/21/2017).    Past Surgical History: Patient has a past surgical history that includes Abdominal surgery; Hysterectomy; Colectomy; Eye surgery; Cyst Removal; Colonoscopy; Cardiac catheterization; Liver biopsy (07/2017); and Hernia repair.    Social History: Patient reports that she has never smoked. She has never used smokeless tobacco. She reports that she does not drink alcohol or use drugs.    Family History: family history includes Cancer in her father, maternal aunt, maternal aunt, and sister; Diabetes in her daughter, father, and sister; Kidney disease in her brother; Mental illness in her mother; No Known Problems in her brother, brother, brother, daughter, son, and son; Stomach cancer in her  sister.    Medications:   Prior to Admission medications    Medication Sig Start Date End Date Taking? Authorizing Provider   amlodipine (NORVASC) 10 MG tablet Take 1 tablet (10 mg total) by mouth once daily. 7/27/17 7/27/18 Yes Moo Elizabeth MD   aspirin (ECOTRIN) 81 MG EC tablet Take 1 tablet (81 mg total) by mouth once daily. 11/9/16  Yes Víctor Sheets MD   blood sugar diagnostic (BLOOD GLUCOSE TEST) Strp 1 strip by Misc.(Non-Drug; Combo Route) route 3 (three) times daily as needed. 4/26/16  Yes Nidia Melendez NP   blood-glucose meter (FREESTYLE SYSTEM KIT) kit 1 each by Other route 2 (two) times daily. Use as instructed 2/7/18  Yes Nidia Melendez NP   ergocalciferol (ERGOCALCIFEROL) 50,000 unit Cap Take 1 capsule (50,000 Units total) by mouth every 7 days. 11/9/16  Yes Víctor Sheets MD   lancets (ONETOUCH ULTRASOFT LANCETS) Misc 1 each by Misc.(Non-Drug; Combo Route) route 2 (two) times daily as needed. 2/7/18  Yes Nidia Melendez NP   amoxicillin-clavulanate 500-125mg (AUGMENTIN) 500-125 mg Tab Take 1 tablet (500 mg total) by mouth 2 (two) times daily. for 7 days 6/29/18 7/6/18  Tadeo Woods MD   nitroGLYCERIN (NITROSTAT) 0.4 MG SL tablet Place 1 tablet (0.4 mg total) under the tongue every 5 (five) minutes as needed for Chest pain. 5/11/17 5/11/18  Víctor Sheets MD   ondansetron (ZOFRAN) 4 MG tablet Take 1 tablet (4 mg total) by mouth every 6 (six) hours as needed for Nausea. 1/11/18   Buck Davison MD   ondansetron (ZOFRAN-ODT) 4 MG TbDL Take 1 tablet (4 mg total) by mouth every 6 (six) hours as needed. 6/29/18   Tadeo Woods MD   oxyCODONE (ROXICODONE) 5 MG immediate release tablet Take 1 tablet (5 mg total) by mouth every 6 (six) hours as needed for Pain. 6/29/18   Tadeo Woods MD   promethazine (PHENERGAN) 12.5 MG Supp Place 1 suppository (12.5 mg total) rectally every 6 (six) hours as needed. 11/21/17   Doyle Dorantes DO, FACP   ranolazine (RANEXA) 500 MG  Tb12 Take 1 tablet (500 mg total) by mouth 2 (two) times daily. 6/13/18 6/13/19  Yang Child NP       Allergies: Patient is allergic to epinephrine; diltiazem; and morphine.    ROS  Pain Scale: 10/10   Constitutional: no fever or chills  Respiratory: no cough or shortness of breath  Cardiovascular: + chest pain, no palpitations  Gastrointestinal: + nausea no vomiting, + abdominal pain change in bowel habits  Genitourinary: no hematuria or dysuria  Integument/Breast: no rash or pruritis  Hematologic/Lymphatic: no easy bruising or lymphadenopathy  Musculoskeletal: no arthralgias or myalgias  Neurological: no seizures or tremors  Behavioral/Psych: no depression, + anxiety    PEx  Temp:  [97.9 °F (36.6 °C)-98.6 °F (37 °C)]   Pulse:  [68-92]   Resp:  [15-20]   BP: (146-206)/(64-96)   SpO2:  [96 %-100 %]   Body mass index is 27.27 kg/m².     General appearance: Moderate distress, AA woman  Mental status: Alert and oriented x 3  HEENT:  conjunctivae/corneas clear, PERRL  Neck: supple, thyroid not enlarged  Pulm:   normal respiratory effort, CTA B, no c/w/r  Card: tachycardic, S1, S2 normal, no murmur, click, rub or gallop  Abd: soft, diffusely tender to palpation, ND, BS present; no masses, no organomegaly  Ext: no c/c/e  Pulses: 2+, symmetric  Skin: color, texture, turgor normal. No rashes or lesions  Neuro: CN II-XII grossly intact, no focal numbness or weakness, normal strength and tone       No intake or output data in the 24 hours ending 06/29/18 1923    Recent Labs  Lab 06/29/18  0820   WBC 9.85   HGB 12.4   HCT 37.9          Recent Labs  Lab 06/29/18  0820      K 4.8   *   CO2 23   BUN 31*   CREATININE 1.9*   GLU 82   CALCIUM 9.5       Recent Labs  Lab 06/29/18  0820   ALKPHOS 205*   ALT 31   AST 40   ALBUMIN 3.5   PROT 7.0   BILITOT 0.8   INR 1.0        Recent Labs  Lab 06/29/18  0959 06/29/18  1240   POCTGLUCOSE 79 69*     Recent Labs      06/29/18   1710   TROPONINI  <0.006       No results  for input(s): LACTATE in the last 72 hours.     Active Hospital Problems    Diagnosis  POA    Neuroendocrine tumor [D3A.8]  Yes      Resolved Hospital Problems    Diagnosis Date Resolved POA   No resolved problems to display.       Overview  67F h/o neuroendocrine tumor with mets to liver, lungs, CAD (3x MI, no stents), HTN, DM2, CKD was admitted s/p TACE procedure by IR, with abdominal, chest and back pain s/p procedure.    Assessment and Plan for Problems addressed today:    Abdominal / Chest / back pain  -Occurred s/p TACE, given CAD history obtained EKG (unchanged from baseline), and troponin (negative)  -Likely 2/2 inflammation s/p procedure, less likely ACS, gastritis, would not trend further troponins at this time  -Pain control per primary  -Miralax PRN for constipation    Hypertension  -BP elevated however is near baseline from prior office visits (180s/80s)  -Continue patient home medications amlodipine 10mg daily  -Labetolol 10mg PRN SBP >200mm Hg    Neuroendocrine tumor w. Mets to liver/lungs  -Managed per Heme/onc, s/p TACE  -Outpatient f/u  -Pain control per primary    DM2  -Well controlled, A1C 5.3, off medications  -Continue to monitor    CKD3  -Stable  -continue to monitor    Will sign off at this time, please contact if have further questions    Saman Villanueva MD  Department of Hospital Medicine  Pager: [phone]  Spectra: [phone]

## 2018-07-01 NOTE — DISCHARGE SUMMARY
Radiology Discharge Summary      Hospital Course: No complications    Admit Date: 6/29/2018  Discharge Date: 07/01/2018     Instructions Given to Patient: Yes  Diet: Resume prior diet  Activity: activity as tolerated    Description of Condition on Discharge: Stable  Vital Signs (Most Recent): Temp: 96.3 °F (35.7 °C) (06/30/18 1229)  Pulse: 66 (06/30/18 1452)  Resp: 16 (06/30/18 1229)  BP: (!) 184/79 (06/30/18 1425)  SpO2: 95 % (06/30/18 1229)    Discharge Disposition: Home    Discharge Diagnosis: metastatic neuroendocrine tumor     Follow-up: per Dr. Jose E Puckett MD  Radiology PGY-2  [phone]

## 2018-07-02 DIAGNOSIS — C7B.8 SECONDARY NEUROENDOCRINE TUMOR OF LIVER: Primary | ICD-10-CM

## 2018-07-03 ENCOUNTER — TELEPHONE (OUTPATIENT)
Dept: NEUROLOGY | Facility: HOSPITAL | Age: 68
End: 2018-07-03

## 2018-07-03 RX ORDER — AMOXICILLIN AND CLAVULANATE POTASSIUM 250; 62.5 MG/5ML; MG/5ML
500 POWDER, FOR SUSPENSION ORAL EVERY 12 HOURS
Status: DISCONTINUED | OUTPATIENT
Start: 2018-07-03 | End: 2018-07-03 | Stop reason: HOSPADM

## 2018-07-03 NOTE — TELEPHONE ENCOUNTER
----- Message from Annabella Gagnon sent at 7/3/2018  8:07 AM CDT -----  Contact: daughter Taya  RR- daughter called wanting to get a liquid form of amoxillan. Patient is having a hard time swallowing pills. Also she needs her blood pressure med refilled to the [facility] on [address]. Call back number [phone]

## 2018-07-05 DIAGNOSIS — C7B.8 METASTATIC MALIGNANT NEUROENDOCRINE TUMOR TO LIVER: Primary | ICD-10-CM

## 2018-07-05 RX ORDER — AMOXICILLIN AND CLAVULANATE POTASSIUM 250; 62.5 MG/5ML; MG/5ML
500 POWDER, FOR SUSPENSION ORAL 2 TIMES DAILY
Qty: 400 ML | Refills: 0 | Status: ON HOLD | OUTPATIENT
Start: 2018-07-05 | End: 2018-07-10

## 2018-07-05 RX ORDER — AMOXICILLIN AND CLAVULANATE POTASSIUM 250; 62.5 MG/5ML; MG/5ML
500 POWDER, FOR SUSPENSION ORAL 2 TIMES DAILY
COMMUNITY
End: 2018-07-05 | Stop reason: SDUPTHER

## 2018-07-05 NOTE — TELEPHONE ENCOUNTER
Phoned Taya and discussed the antibiotic.  E-scribed Augmentin to [facility] for her daughter to  at 1 PM today.  Pended to Dr Dorantes to approve.

## 2018-07-05 NOTE — TELEPHONE ENCOUNTER
----- Message from Christine Liu sent at 7/5/2018  8:46 AM CDT -----  Contact: Taya BANGURA- Taya, patients daughter has called in regards to having a liquid antibiotic called into [facility] on [address]. Taya states she was supposed to  the medication on Tuesday and the pharmacy does not have the medication. Taya would like a call back as soon as possible at [phone]

## 2018-07-09 ENCOUNTER — HOSPITAL ENCOUNTER (INPATIENT)
Facility: HOSPITAL | Age: 68
LOS: 2 days | Discharge: HOSPICE | DRG: 871 | End: 2018-07-11
Attending: EMERGENCY MEDICINE | Admitting: INTERNAL MEDICINE
Payer: MEDICARE

## 2018-07-09 ENCOUNTER — TELEPHONE (OUTPATIENT)
Dept: NEUROLOGY | Facility: HOSPITAL | Age: 68
End: 2018-07-09

## 2018-07-09 DIAGNOSIS — E87.5 HYPERKALEMIA: ICD-10-CM

## 2018-07-09 DIAGNOSIS — R10.9 ABDOMINAL PAIN, UNSPECIFIED ABDOMINAL LOCATION: ICD-10-CM

## 2018-07-09 DIAGNOSIS — A41.9 SEPSIS WITH MULTI-ORGAN DYSFUNCTION: Primary | ICD-10-CM

## 2018-07-09 DIAGNOSIS — C7B.8 SECONDARY NEUROENDOCRINE TUMOR OF LIVER: Primary | ICD-10-CM

## 2018-07-09 DIAGNOSIS — R65.20 SEPSIS WITH MULTI-ORGAN DYSFUNCTION: Primary | ICD-10-CM

## 2018-07-09 DIAGNOSIS — R10.13 EPIGASTRIC PAIN: ICD-10-CM

## 2018-07-09 DIAGNOSIS — I63.9 CEREBROVASCULAR ACCIDENT (CVA), UNSPECIFIED MECHANISM: ICD-10-CM

## 2018-07-09 DIAGNOSIS — A41.9 SEPSIS: ICD-10-CM

## 2018-07-09 DIAGNOSIS — R74.01 TRANSAMINITIS: ICD-10-CM

## 2018-07-09 DIAGNOSIS — D72.829 LEUKOCYTOSIS, UNSPECIFIED TYPE: ICD-10-CM

## 2018-07-09 DIAGNOSIS — N17.9 AKI (ACUTE KIDNEY INJURY): ICD-10-CM

## 2018-07-09 DIAGNOSIS — R10.9 ABDOMINAL PAIN: ICD-10-CM

## 2018-07-09 DIAGNOSIS — C7B.8 METASTATIC MALIGNANT NEUROENDOCRINE TUMOR TO LIVER: ICD-10-CM

## 2018-07-09 LAB
ALBUMIN SERPL BCP-MCNC: 2.9 G/DL
ALLENS TEST: ABNORMAL
ALP SERPL-CCNC: 293 U/L
ALT SERPL W/O P-5'-P-CCNC: 241 U/L
ANION GAP SERPL CALC-SCNC: 12 MMOL/L
ANION GAP SERPL CALC-SCNC: 15 MMOL/L
ANISOCYTOSIS BLD QL SMEAR: SLIGHT
AST SERPL-CCNC: 63 U/L
BACTERIA #/AREA URNS HPF: ABNORMAL /HPF
BASOPHILS # BLD AUTO: ABNORMAL K/UL
BASOPHILS NFR BLD: 0 %
BILIRUB SERPL-MCNC: 2.8 MG/DL
BILIRUB UR QL STRIP: NEGATIVE
BUN SERPL-MCNC: 108 MG/DL
BUN SERPL-MCNC: 127 MG/DL
CALCIUM SERPL-MCNC: 9.2 MG/DL
CALCIUM SERPL-MCNC: 9.5 MG/DL
CHLORIDE SERPL-SCNC: 118 MMOL/L
CHLORIDE SERPL-SCNC: 124 MMOL/L
CK SERPL-CCNC: 85 U/L
CLARITY UR: CLEAR
CO2 SERPL-SCNC: 18 MMOL/L
CO2 SERPL-SCNC: 21 MMOL/L
COLOR UR: YELLOW
CREAT SERPL-MCNC: 3.2 MG/DL
CREAT SERPL-MCNC: 3.7 MG/DL
CREAT UR-MCNC: 63.1 MG/DL
CREAT UR-MCNC: 63.1 MG/DL
DACRYOCYTES BLD QL SMEAR: ABNORMAL
DELSYS: ABNORMAL
DIFFERENTIAL METHOD: ABNORMAL
EOSINOPHIL # BLD AUTO: ABNORMAL K/UL
EOSINOPHIL NFR BLD: 1 %
ERYTHROCYTE [DISTWIDTH] IN BLOOD BY AUTOMATED COUNT: 14.6 %
EST. GFR  (AFRICAN AMERICAN): 14 ML/MIN/1.73 M^2
EST. GFR  (AFRICAN AMERICAN): 17 ML/MIN/1.73 M^2
EST. GFR  (NON AFRICAN AMERICAN): 12 ML/MIN/1.73 M^2
EST. GFR  (NON AFRICAN AMERICAN): 15 ML/MIN/1.73 M^2
ESTIMATED AVG GLUCOSE: 114 MG/DL
GLUCOSE SERPL-MCNC: 45 MG/DL
GLUCOSE SERPL-MCNC: 62 MG/DL
GLUCOSE UR QL STRIP: NEGATIVE
HBA1C MFR BLD HPLC: 5.6 %
HCO3 UR-SCNC: 20.5 MMOL/L (ref 24–28)
HCT VFR BLD AUTO: 45.6 %
HGB BLD-MCNC: 15.1 G/DL
HGB UR QL STRIP: NEGATIVE
HYALINE CASTS #/AREA URNS LPF: 0 /LPF
HYPOCHROMIA BLD QL SMEAR: ABNORMAL
KETONES UR QL STRIP: NEGATIVE
LACTATE SERPL-SCNC: 2.8 MMOL/L
LEUKOCYTE ESTERASE UR QL STRIP: NEGATIVE
LIPASE SERPL-CCNC: 158 U/L
LYMPHOCYTES # BLD AUTO: ABNORMAL K/UL
LYMPHOCYTES NFR BLD: 2 %
MCH RBC QN AUTO: 28.4 PG
MCHC RBC AUTO-ENTMCNC: 33.1 G/DL
MCV RBC AUTO: 86 FL
MICROSCOPIC COMMENT: ABNORMAL
MONOCYTES # BLD AUTO: ABNORMAL K/UL
MONOCYTES NFR BLD: 4 %
NEUTROPHILS NFR BLD: 93 %
NITRITE UR QL STRIP: NEGATIVE
OVALOCYTES BLD QL SMEAR: ABNORMAL
PCO2 BLDA: 37.3 MMHG (ref 35–45)
PH SMN: 7.35 [PH] (ref 7.35–7.45)
PH UR STRIP: 6 [PH] (ref 5–8)
PLATELET # BLD AUTO: 266 K/UL
PLATELET BLD QL SMEAR: ABNORMAL
PMV BLD AUTO: 11.5 FL
PO2 BLDA: 100 MMHG (ref 80–100)
POC BE: -5 MMOL/L
POC SATURATED O2: 97 % (ref 95–100)
POC TCO2: 22 MMOL/L (ref 23–27)
POCT GLUCOSE: 107 MG/DL (ref 70–110)
POCT GLUCOSE: 52 MG/DL (ref 70–110)
POIKILOCYTOSIS BLD QL SMEAR: SLIGHT
POTASSIUM SERPL-SCNC: 5.1 MMOL/L
POTASSIUM SERPL-SCNC: 5.4 MMOL/L
PROT SERPL-MCNC: 6.5 G/DL
PROT UR QL STRIP: ABNORMAL
PROT UR-MCNC: 64 MG/DL
PROT/CREAT RATIO, UR: 1.01
RBC # BLD AUTO: 5.32 M/UL
RBC #/AREA URNS HPF: 0 /HPF (ref 0–4)
SAMPLE: ABNORMAL
SITE: ABNORMAL
SODIUM SERPL-SCNC: 154 MMOL/L
SODIUM SERPL-SCNC: 154 MMOL/L
SODIUM UR-SCNC: <20 MMOL/L
SP GR UR STRIP: 1.02 (ref 1–1.03)
SQUAMOUS #/AREA URNS HPF: 6 /HPF
T4 FREE SERPL-MCNC: 0.69 NG/DL
TROPONIN I SERPL DL<=0.01 NG/ML-MCNC: 0.07 NG/ML
TSH SERPL DL<=0.005 MIU/L-ACNC: 0.28 UIU/ML
URATE SERPL-MCNC: 18.8 MG/DL
URN SPEC COLLECT METH UR: ABNORMAL
UROBILINOGEN UR STRIP-ACNC: NEGATIVE EU/DL
WBC # BLD AUTO: 25.68 K/UL
WBC #/AREA URNS HPF: 4 /HPF (ref 0–5)
YEAST URNS QL MICRO: ABNORMAL

## 2018-07-09 PROCEDURE — 25000003 PHARM REV CODE 250: Performed by: EMERGENCY MEDICINE

## 2018-07-09 PROCEDURE — 83690 ASSAY OF LIPASE: CPT

## 2018-07-09 PROCEDURE — 84439 ASSAY OF FREE THYROXINE: CPT

## 2018-07-09 PROCEDURE — 51702 INSERT TEMP BLADDER CATH: CPT

## 2018-07-09 PROCEDURE — 83605 ASSAY OF LACTIC ACID: CPT

## 2018-07-09 PROCEDURE — 63600175 PHARM REV CODE 636 W HCPCS: Performed by: STUDENT IN AN ORGANIZED HEALTH CARE EDUCATION/TRAINING PROGRAM

## 2018-07-09 PROCEDURE — 99285 EMERGENCY DEPT VISIT HI MDM: CPT | Mod: 25

## 2018-07-09 PROCEDURE — 85027 COMPLETE CBC AUTOMATED: CPT

## 2018-07-09 PROCEDURE — 82550 ASSAY OF CK (CPK): CPT

## 2018-07-09 PROCEDURE — 82570 ASSAY OF URINE CREATININE: CPT

## 2018-07-09 PROCEDURE — 25000003 PHARM REV CODE 250: Performed by: STUDENT IN AN ORGANIZED HEALTH CARE EDUCATION/TRAINING PROGRAM

## 2018-07-09 PROCEDURE — 36600 WITHDRAWAL OF ARTERIAL BLOOD: CPT

## 2018-07-09 PROCEDURE — 84145 PROCALCITONIN (PCT): CPT

## 2018-07-09 PROCEDURE — 84550 ASSAY OF BLOOD/URIC ACID: CPT

## 2018-07-09 PROCEDURE — 83036 HEMOGLOBIN GLYCOSYLATED A1C: CPT

## 2018-07-09 PROCEDURE — 82803 BLOOD GASES ANY COMBINATION: CPT

## 2018-07-09 PROCEDURE — 81000 URINALYSIS NONAUTO W/SCOPE: CPT

## 2018-07-09 PROCEDURE — 99900035 HC TECH TIME PER 15 MIN (STAT)

## 2018-07-09 PROCEDURE — 87086 URINE CULTURE/COLONY COUNT: CPT

## 2018-07-09 PROCEDURE — 85007 BL SMEAR W/DIFF WBC COUNT: CPT

## 2018-07-09 PROCEDURE — 63600175 PHARM REV CODE 636 W HCPCS: Performed by: EMERGENCY MEDICINE

## 2018-07-09 PROCEDURE — 96365 THER/PROPH/DIAG IV INF INIT: CPT

## 2018-07-09 PROCEDURE — 96375 TX/PRO/DX INJ NEW DRUG ADDON: CPT

## 2018-07-09 PROCEDURE — 87040 BLOOD CULTURE FOR BACTERIA: CPT

## 2018-07-09 PROCEDURE — 20000000 HC ICU ROOM

## 2018-07-09 PROCEDURE — 80048 BASIC METABOLIC PNL TOTAL CA: CPT

## 2018-07-09 PROCEDURE — 36415 COLL VENOUS BLD VENIPUNCTURE: CPT

## 2018-07-09 PROCEDURE — 84300 ASSAY OF URINE SODIUM: CPT

## 2018-07-09 PROCEDURE — 96361 HYDRATE IV INFUSION ADD-ON: CPT

## 2018-07-09 PROCEDURE — 93005 ELECTROCARDIOGRAM TRACING: CPT

## 2018-07-09 PROCEDURE — 63600175 PHARM REV CODE 636 W HCPCS: Performed by: INTERNAL MEDICINE

## 2018-07-09 PROCEDURE — 80053 COMPREHEN METABOLIC PANEL: CPT

## 2018-07-09 PROCEDURE — 84484 ASSAY OF TROPONIN QUANT: CPT

## 2018-07-09 PROCEDURE — S5010 5% DEXTROSE AND 0.45% SALINE: HCPCS | Performed by: STUDENT IN AN ORGANIZED HEALTH CARE EDUCATION/TRAINING PROGRAM

## 2018-07-09 PROCEDURE — 84443 ASSAY THYROID STIM HORMONE: CPT

## 2018-07-09 RX ORDER — NAPROXEN SODIUM 220 MG/1
162 TABLET, FILM COATED ORAL
Status: COMPLETED | OUTPATIENT
Start: 2018-07-09 | End: 2018-07-09

## 2018-07-09 RX ORDER — HEPARIN SODIUM 5000 [USP'U]/ML
5000 INJECTION, SOLUTION INTRAVENOUS; SUBCUTANEOUS EVERY 8 HOURS
Status: DISCONTINUED | OUTPATIENT
Start: 2018-07-09 | End: 2018-07-11

## 2018-07-09 RX ORDER — INSULIN ASPART 100 [IU]/ML
1-10 INJECTION, SOLUTION INTRAVENOUS; SUBCUTANEOUS
Status: DISCONTINUED | OUTPATIENT
Start: 2018-07-09 | End: 2018-07-11 | Stop reason: HOSPADM

## 2018-07-09 RX ORDER — ONDANSETRON 2 MG/ML
4 INJECTION INTRAMUSCULAR; INTRAVENOUS EVERY 6 HOURS PRN
Status: DISCONTINUED | OUTPATIENT
Start: 2018-07-09 | End: 2018-07-11 | Stop reason: HOSPADM

## 2018-07-09 RX ORDER — DEXTROSE MONOHYDRATE AND SODIUM CHLORIDE 5; .45 G/100ML; G/100ML
INJECTION, SOLUTION INTRAVENOUS CONTINUOUS
Status: DISCONTINUED | OUTPATIENT
Start: 2018-07-09 | End: 2018-07-10

## 2018-07-09 RX ORDER — ONDANSETRON 2 MG/ML
4 INJECTION INTRAMUSCULAR; INTRAVENOUS
Status: COMPLETED | OUTPATIENT
Start: 2018-07-09 | End: 2018-07-09

## 2018-07-09 RX ORDER — IBUPROFEN 200 MG
16 TABLET ORAL
Status: DISCONTINUED | OUTPATIENT
Start: 2018-07-09 | End: 2018-07-11 | Stop reason: HOSPADM

## 2018-07-09 RX ORDER — LABETALOL HYDROCHLORIDE 5 MG/ML
10 INJECTION, SOLUTION INTRAVENOUS ONCE
Status: COMPLETED | OUTPATIENT
Start: 2018-07-09 | End: 2018-07-09

## 2018-07-09 RX ORDER — GLUCAGON 1 MG
1 KIT INJECTION
Status: DISCONTINUED | OUTPATIENT
Start: 2018-07-09 | End: 2018-07-11 | Stop reason: HOSPADM

## 2018-07-09 RX ORDER — SODIUM CHLORIDE 0.9 % (FLUSH) 0.9 %
5 SYRINGE (ML) INJECTION
Status: DISCONTINUED | OUTPATIENT
Start: 2018-07-09 | End: 2018-07-11 | Stop reason: HOSPADM

## 2018-07-09 RX ORDER — VANCOMYCIN HCL IN 5 % DEXTROSE 1G/250ML
15 PLASTIC BAG, INJECTION (ML) INTRAVENOUS ONCE
Status: COMPLETED | OUTPATIENT
Start: 2018-07-09 | End: 2018-07-09

## 2018-07-09 RX ORDER — IBUPROFEN 200 MG
24 TABLET ORAL
Status: DISCONTINUED | OUTPATIENT
Start: 2018-07-09 | End: 2018-07-11 | Stop reason: HOSPADM

## 2018-07-09 RX ADMIN — ONDANSETRON 4 MG: 2 INJECTION INTRAMUSCULAR; INTRAVENOUS at 04:07

## 2018-07-09 RX ADMIN — DEXTROSE MONOHYDRATE 12.5 G: 25 INJECTION, SOLUTION INTRAVENOUS at 09:07

## 2018-07-09 RX ADMIN — SODIUM CHLORIDE 1000 ML: 0.9 INJECTION, SOLUTION INTRAVENOUS at 04:07

## 2018-07-09 RX ADMIN — VANCOMYCIN HYDROCHLORIDE 1000 MG: 1 INJECTION, POWDER, LYOPHILIZED, FOR SOLUTION INTRAVENOUS at 09:07

## 2018-07-09 RX ADMIN — ONDANSETRON 4 MG: 2 INJECTION INTRAMUSCULAR; INTRAVENOUS at 10:07

## 2018-07-09 RX ADMIN — PIPERACILLIN AND TAZOBACTAM 4.5 G: 4; .5 INJECTION, POWDER, LYOPHILIZED, FOR SOLUTION INTRAVENOUS; PARENTERAL at 06:07

## 2018-07-09 RX ADMIN — SODIUM CHLORIDE 1000 ML: 0.9 INJECTION, SOLUTION INTRAVENOUS at 06:07

## 2018-07-09 RX ADMIN — HEPARIN SODIUM 5000 UNITS: 5000 INJECTION, SOLUTION INTRAVENOUS; SUBCUTANEOUS at 09:07

## 2018-07-09 RX ADMIN — SODIUM CHLORIDE 1000 ML: 0.9 INJECTION, SOLUTION INTRAVENOUS at 07:07

## 2018-07-09 RX ADMIN — ASPIRIN 81 MG 162 MG: 81 TABLET ORAL at 07:07

## 2018-07-09 RX ADMIN — DEXTROSE AND SODIUM CHLORIDE: 5; .45 INJECTION, SOLUTION INTRAVENOUS at 10:07

## 2018-07-09 RX ADMIN — LABETALOL HYDROCHLORIDE 10 MG: 5 INJECTION, SOLUTION INTRAVENOUS at 10:07

## 2018-07-09 NOTE — TELEPHONE ENCOUNTER
----- Message from April Almazan sent at 7/9/2018  8:28 AM CDT -----  Contact: Pt daughter Taya  Pt daughter calling stating that pt has not been doing well after her sx. She would like to speak with nurse to discuss options         Taya call back number [phone]

## 2018-07-09 NOTE — TELEPHONE ENCOUNTER
Let Dr. Dorantes know of issue, he said ER, called daughter again, she agreed for ER at this time.  Asked her to go to [place].

## 2018-07-09 NOTE — TELEPHONE ENCOUNTER
----- Message from Zee Swartz sent at 7/9/2018 12:17 PM CDT -----  Contact: Pt daughter   Pt daughter called to speak with Colette about pt lab orders and would like it to be put in today pt will be going to the ER Today   Callback#[phone]

## 2018-07-09 NOTE — ED TRIAGE NOTES
"Pt presents to ED today c/o fatigue, nausea , and vomiting since her procedure on 6/29/18. Pt is s/p femoral artery angiogram. Family reports pt not answer questions appropriately, appears to be fatigue, and "just not acting like herself". Pt answers questions appropriately and is AAO x4  "

## 2018-07-09 NOTE — ED NOTES
APPEARANCE: Alert, oriented and in no acute distress.  CARDIAC: Normal rate and rhythm, no murmur heard.   PERIPHERAL VASCULAR: peripheral pulses present. Normal cap refill. No edema. Warm to touch.    RESPIRATORY:Normal rate and effort, breath sounds clear bilaterally throughout chest. Respirations are equal and unlabored no obvious signs of distress.  GASTRO: soft, bowel sounds normal, no tenderness, no abdominal distention.  MUSC: Full ROM. No bony tenderness or soft tissue tenderness. No obvious deformity. Nausea   SKIN: Skin is warm and dry, normal skin turgor, mucous membranes moist.  NEURO: 5/5 strength major flexors/extensors bilaterally. Sensory intact to light touch bilaterally. [place] coma scale: eyes open spontaneously-4, oriented & converses-5, obeys commands-6. No neurological abnormalities.   MENTAL STATUS: awake, alert and aware of environment.  EYE: PERRL, both eyes: pupils brisk and reactive to light. Normal size.  ENT: EARS: no obvious drainage. NOSE: no active bleeding.   Fatigue

## 2018-07-09 NOTE — LETTER
July 10, 2018         [address] [place] [address] [place] [id number]  Phone: [phone]  Fax: [phone]       Patient: Lala Ames   YOB: 1950  Date of Visit: 07/10/2018    To Whom It May Concern:    Shaheed Ames, the mother of Coreen Ames was at [facility] on 07/10/2018. If you have any questions or concerns, or if I can be of further assistance, please do not hesitate to contact me.    Sincerely,    Heaven Leigh RN

## 2018-07-09 NOTE — TELEPHONE ENCOUNTER
"Patient had TACE on 6/29 and has been extremely lethargic and this past weekend has been "acting like a 4 year old" and having hallucinations.  She assumed it was her blood sugar, she has been off Metformin for a while but this weekend her glucose per home test was 310mg/dL, her daughter started her on metformin.  Will contact PCP.  Will forward message to Dr. Dorantes.  Ordered post TACE labs along with ammonia and U/A per request of patient's daughter.  Will also let Dr. Madden  know to schedule MRI and appt on different days.       "

## 2018-07-09 NOTE — ED PROVIDER NOTES
Encounter Date: 7/9/2018    SCRIBE #1 NOTE: I, Teresa Mikhail, am scribing for, and in the presence of,  Dr. Raman. I have scribed the entire note.       History     Chief Complaint   Patient presents with    Fatigue     pt had a femoral artery angiogram procedure on 6/29. Has been lethargic since then. Called Dr. Dorantes and was told to come to the ER. Family member reports pt was hyperglycemic last night, but that it improved today     Time seen by the provider: 2:18 PM    This is a 67 y.o. female with PMHx of liver cancer, CAD, HTN, DM, PSHx of hysterectomy, abdominal surgery, colectomy, hernia repair, and cardiac catheretization, who presents to the ED with her sister-in-law who expressed concern due to the pt's increased fatigue, increased weakness, nausea, dry heaving, loss of appetite, hyperglycemia, confusion, and increased difficulty walking over the past few days. She denies fever, numbness, or focal weakness. The pt is currently being treated for metastatic neuroendocrine cancer, unknown primary; per sister-in-law the pt was diagnosed almost a year ago. She had a TACE procedure done 2 weeks ago. She reports a femoral artery angiogram procedure done on 6/29. The pt's daughter (who is her caregiver) called Dr. Dorantes today who advised she present to the ED.      The history is provided by a relative. The history is limited by the condition of the patient.     Review of patient's allergies indicates:   Allergen Reactions    Epinephrine     Diltiazem Rash and Other (See Comments)     Skin peels    Morphine Rash     Past Medical History:   Diagnosis Date    Anticoagulant long-term use     Arthritis     Blood transfusion     Coronary artery disease     Diabetes mellitus     Diabetes mellitus, type 2     Diabetic retinopathy     Disorder of kidney and ureter     H/O coronary angiogram     Hypertension     Liver cancer     Lung metastases 12/5/2017    MI (myocardial infarction)     x3     Nausea 11/21/2017    Neuroendocrine carcinoma, 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    PDR (proliferative diabetic retinopathy)     Secondary malignant carcinoid tumor of liver     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     Past Surgical History:   Procedure Laterality Date    ABDOMINAL SURGERY      bowel had to be repaired after hysterectomy    CARDIAC CATHETERIZATION      COLECTOMY      r/t bowel injury from hysterectomy    COLONOSCOPY      CYST REMOVAL      soft tissue on forehead    EMBOLIZATION N/A 6/29/2018    Procedure: Embolization;  Surgeon: Dheeraj Diagnostic Provider;  Location: [facility] OR [address] [place] [address];  Service: General;  Laterality: N/A;    EYE SURGERY      HERNIA REPAIR      x2    HYSTERECTOMY      LIVER BIOPSY  07/2017     Family History   Problem Relation Age of Onset    Mental illness Mother     Diabetes Father     Cancer Father         pancreatic    Diabetes Sister     Stomach cancer Sister     Cancer Sister         gastric    Kidney disease Brother     Diabetes Daughter     No Known Problems Daughter     No Known Problems Son     No Known Problems Son     No Known Problems Brother     No Known Problems Brother     No Known Problems Brother     Cancer Maternal Aunt     Cancer Maternal Aunt      Social History   Substance Use Topics    Smoking status: Never Smoker    Smokeless tobacco: Never Used    Alcohol use No      Comment: Occasionally     Review of Systems   Constitutional: Positive for appetite change (loss) and fatigue. Negative for activity change, chills, diaphoresis and fever.        Positive for generalized weakness. Positive for hyperglycemia.   HENT: Negative for sore throat.    Respiratory: Negative for cough, chest tightness and shortness of breath.    Cardiovascular: Negative for chest pain and palpitations.   Gastrointestinal: Positive for nausea. Negative for abdominal distention, abdominal pain, diarrhea and vomiting.        Positive for dry heaving.    Endocrine: Negative for polydipsia and polyphagia.   Genitourinary: Negative for difficulty urinating, dysuria and flank pain.   Musculoskeletal: Negative for arthralgias.   Skin: Negative for pallor and rash.   Neurological: Negative for dizziness, weakness, numbness and headaches.        Positive for trouble walking.   Psychiatric/Behavioral: Positive for confusion.   All other systems reviewed and are negative.      Physical Exam     Initial Vitals [07/09/18 1407]   BP Pulse Resp Temp SpO2   (!) 164/77 83 20 97.4 °F (36.3 °C) 99 %      MAP       --         Physical Exam    Nursing note and vitals reviewed.  Constitutional: She appears well-developed. No distress.   She is soft spoken.   HENT:   Head: Normocephalic and atraumatic.   Mouth/Throat: Oropharynx is clear and moist and mucous membranes are normal.   Mucous membrane moist.   Eyes: Conjunctivae and EOM are normal. Pupils are equal, round, and reactive to light. No scleral icterus.   Neck: Normal range of motion. Neck supple.   Cardiovascular: Normal rate, regular rhythm and normal heart sounds. Exam reveals no gallop and no friction rub.    No murmur heard.  Pulmonary/Chest: Breath sounds normal. She has no wheezes. She has no rhonchi. She has no rales.   Abdominal: Soft. Bowel sounds are normal. There is tenderness in the right upper quadrant.   Supraventricular hernia that is reducible.   Musculoskeletal: Normal range of motion. She exhibits no edema or tenderness.   Neurological: She is alert and oriented to person, place, and time.   Moving all extremities x 4.   Skin: Skin is warm and dry. No rash noted. No erythema.   Psychiatric: She has a normal mood and affect. Her behavior is normal.         ED Course   Procedures  Labs Reviewed   CBC W/ AUTO DIFFERENTIAL - Abnormal; Notable for the following:        Result Value    WBC 25.68 (*)     RDW 14.6 (*)     Gran% 93.0 (*)     Lymph% 2.0 (*)     All other components within normal limits    COMPREHENSIVE METABOLIC PANEL - Abnormal; Notable for the following:     Sodium 154 (*)     Potassium 5.4 (*)     Chloride 118 (*)     CO2 21 (*)     Glucose 62 (*)     BUN, Bld 127 (*)     Creatinine 3.7 (*)     Albumin 2.9 (*)     Total Bilirubin 2.8 (*)     Alkaline Phosphatase 293 (*)     AST 63 (*)      (*)     eGFR if  14 (*)     eGFR if non  12 (*)     All other components within normal limits   LIPASE - Abnormal; Notable for the following:     Lipase 158 (*)     All other components within normal limits   URINALYSIS - Abnormal; Notable for the following:     Protein, UA 2+ (*)     All other components within normal limits   TROPONIN I - Abnormal; Notable for the following:     Troponin I 0.072 (*)     All other components within normal limits   LACTIC ACID, PLASMA - Abnormal; Notable for the following:     Lactate (Lactic Acid) 2.8 (*)     All other components within normal limits   URINALYSIS MICROSCOPIC - Abnormal; Notable for the following:     Bacteria, UA Few (*)     Yeast, UA Few (*)     All other components within normal limits   CULTURE, URINE   CULTURE, BLOOD   CULTURE, BLOOD   CULTURE, URINE   PROCALCITONIN   SODIUM, URINE, RANDOM   CREATININE, URINE, RANDOM   PROCALCITONIN   SODIUM, URINE, RANDOM   CREATININE, URINE, RANDOM     EKG Readings: (Independently Interpreted)   14:51: Normal Sinus Rhythm. Rate 75. Nonspecific T-wave abnormality.       Imaging Results          CT Abdomen Pelvis  Without Contrast (In process)                CT Head Without Contrast (Final result)     Abnormal  Result time 07/09/18 17:38:58    Final result by Jose Enrique Richard MD (07/09/18 17:38:58)                 Impression:      Left basal ganglia (corpus stratum) suspected acute/subacute infarct.    No intracranial hemorrhage or acute large vascular territory cortical infarct.    This report was flagged in Epic as abnormal.      Electronically signed by: Jose Enrique Richard  MD  Date:    07/09/2018  Time:    17:38             Narrative:    EXAMINATION:  CT HEAD WITHOUT CONTRAST    CLINICAL HISTORY:  Confusion/delirium, altered LOC, unexplained;    TECHNIQUE:  Low dose axial CT images obtained throughout the head without intravenous contrast. Sagittal and coronal reconstructions were performed.    COMPARISON:  None.    FINDINGS:  Intracranial compartment:    Mild generalized cerebral volume loss.  The ventricles are midline without distortion by mass effect or evidence of acute hydrocephalus.  No extra-axial blood or fluid collections.    There is an area of hypoattenuation without volume loss within the anterior limb of the left internal capsule which also extends to the left caudate and left subinsular region and anterior aspect of the left lenticular nucleus concerning for acute/subacute infarct.  No parenchymal mass, hemorrhage, edema or major vascular distribution infarct.    Skull/extracranial contents (limited evaluation): No fracture. Mastoid air cells and paranasal sinuses are essentially clear.  Visualized portions of the orbits are within normal limits.                               X-Ray Chest AP Portable (Final result)  Result time 07/09/18 14:56:06    Final result by Darwin Ashford MD (07/09/18 14:56:06)                 Impression:      1. No acute intrathoracic processes.      Electronically signed by: Darwin Ashford MD  Date:    07/09/2018  Time:    14:56             Narrative:    EXAMINATION:  XR CHEST AP PORTABLE    CLINICAL HISTORY:  Epigastric pain    TECHNIQUE:  Single frontal view of the chest was performed.    COMPARISON:  Chest 09/19/2017    FINDINGS:  There is a poor inspiratory effort when compared to the previous study.  No acute lobar consolidations or pneumothorax or pulmonary vascular congestion or pleural effusions.  Heart size is within normal limits.  There is faint atherosclerotic changes of the aortic arch.  There are spondylotic changes in the dorsal  spine.                                 Medical Decision Making:   Initial Assessment:   This is a 67 y.o. female with PMHx of liver cancer, CAD, HTN, DM, PSHx of hysterectomy, abdominal surgery, colectomy, hernia repair, and cardiac catheretization, who presents to the ED with her sister-in-law who expressed concern due to the pt's increased fatigue, increased weakness, nausea, dry heaving, loss of appetite, hyperglycemia, confusion, and increased difficulty walking.  Differential Diagnosis:   Sepsis, stroke, head trauma, MI, meningitis, DKA, shock, hepatic encephalopathy,  electrolytes, hypoxia, CO poisoning, nutritional (thiamin (EtOH), B6 (on TB tx), B12, folate, niacin), CO poisoning, drugs, EtOH,  hypothermia, dementia/delirium  Clinical Tests:   Lab Tests: Ordered and Reviewed  Radiological Study: Ordered and Reviewed  Medical Tests: Ordered and Reviewed  ED Management:  6:48 PM: Abnormal CT scan noted. Pt not candidate for thrombolytics, or endovascular treatment because onset is over 12 hours ago. Discussed with [facility]. I will recommend consult with neurology and cardiology  Other:   I have discussed this case with another health care provider.       <> Summary of the Discussion: 6:30 PM: Discussed case with [facility] who will admit the pt.  7:11 PM: Discussed the case with Dr. Ness.                      Clinical Impression:     1. Cerebrovascular accident (CVA), unspecified mechanism    2. Abdominal pain    3. Epigastric pain    4. 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    5. Leukocytosis, unspecified type            Disposition:   Disposition: Admitted  Condition: Stable    Scribe attestation: I, Dr. Yang Raman, personally performed the services described in this documentation. All medical record entries made by the scribe were at my direction and in my presence.  I have reviewed the chart and agree that the record reflects my personal performance and is accurate and complete                        Yang Raman MD  07/14/18 0963

## 2018-07-10 PROBLEM — Z71.89 GOALS OF CARE, COUNSELING/DISCUSSION: Status: ACTIVE | Noted: 2018-07-10

## 2018-07-10 PROBLEM — Z71.89 COUNSELING REGARDING ADVANCED CARE PLANNING AND GOALS OF CARE: Status: ACTIVE | Noted: 2018-07-10

## 2018-07-10 PROBLEM — E44.0 MODERATE MALNUTRITION: Status: ACTIVE | Noted: 2018-07-10

## 2018-07-10 PROBLEM — Z51.5 PALLIATIVE CARE ENCOUNTER: Status: ACTIVE | Noted: 2018-07-10

## 2018-07-10 PROBLEM — C7B.8 METASTATIC MALIGNANT NEUROENDOCRINE TUMOR TO LIVER: Status: ACTIVE | Noted: 2017-07-28

## 2018-07-10 LAB
ALBUMIN SERPL BCP-MCNC: 2 G/DL
ALP SERPL-CCNC: 183 U/L
ALT SERPL W/O P-5'-P-CCNC: 144 U/L
AMMONIA PLAS-SCNC: 25 UMOL/L
ANION GAP SERPL CALC-SCNC: 8 MMOL/L
ANION GAP SERPL CALC-SCNC: 8 MMOL/L
ANION GAP SERPL CALC-SCNC: 9 MMOL/L
AST SERPL-CCNC: 40 U/L
BASOPHILS # BLD AUTO: 0 K/UL
BASOPHILS NFR BLD: 0 %
BILIRUB SERPL-MCNC: 2 MG/DL
BUN SERPL-MCNC: 108 MG/DL
BUN SERPL-MCNC: 116 MG/DL
BUN SERPL-MCNC: 96 MG/DL
CALCIUM SERPL-MCNC: 8.6 MG/DL
CALCIUM SERPL-MCNC: 9.1 MG/DL
CALCIUM SERPL-MCNC: 9.3 MG/DL
CHLORIDE SERPL-SCNC: 119 MMOL/L
CHLORIDE SERPL-SCNC: 120 MMOL/L
CHLORIDE SERPL-SCNC: 123 MMOL/L
CO2 SERPL-SCNC: 20 MMOL/L
CO2 SERPL-SCNC: 22 MMOL/L
CO2 SERPL-SCNC: 24 MMOL/L
CREAT SERPL-MCNC: 2.9 MG/DL
CREAT SERPL-MCNC: 2.9 MG/DL
CREAT SERPL-MCNC: 3.1 MG/DL
DIASTOLIC DYSFUNCTION: YES
DIFFERENTIAL METHOD: ABNORMAL
EOSINOPHIL # BLD AUTO: 0.1 K/UL
EOSINOPHIL NFR BLD: 0.3 %
ERYTHROCYTE [DISTWIDTH] IN BLOOD BY AUTOMATED COUNT: 14.5 %
EST. GFR  (AFRICAN AMERICAN): 17 ML/MIN/1.73 M^2
EST. GFR  (AFRICAN AMERICAN): 19 ML/MIN/1.73 M^2
EST. GFR  (AFRICAN AMERICAN): 19 ML/MIN/1.73 M^2
EST. GFR  (NON AFRICAN AMERICAN): 15 ML/MIN/1.73 M^2
EST. GFR  (NON AFRICAN AMERICAN): 16 ML/MIN/1.73 M^2
EST. GFR  (NON AFRICAN AMERICAN): 16 ML/MIN/1.73 M^2
GLUCOSE SERPL-MCNC: 176 MG/DL
GLUCOSE SERPL-MCNC: 217 MG/DL
GLUCOSE SERPL-MCNC: 243 MG/DL
HCT VFR BLD AUTO: 32.2 %
HGB BLD-MCNC: 10.4 G/DL
LACTATE SERPL-SCNC: 1.2 MMOL/L
LDH SERPL L TO P-CCNC: 258 U/L
LYMPHOCYTES # BLD AUTO: 0.5 K/UL
LYMPHOCYTES NFR BLD: 3.6 %
MCH RBC QN AUTO: 27.8 PG
MCHC RBC AUTO-ENTMCNC: 32.3 G/DL
MCV RBC AUTO: 86 FL
MITRAL VALVE MOBILITY: NORMAL
MONOCYTES # BLD AUTO: 0.9 K/UL
MONOCYTES NFR BLD: 6.4 %
NEUTROPHILS # BLD AUTO: 13 K/UL
NEUTROPHILS NFR BLD: 89 %
OSMOLALITY SERPL: 351 MOSM/KG
OSMOLALITY UR: 403 MOSM/KG
PLATELET # BLD AUTO: 186 K/UL
PMV BLD AUTO: 12 FL
POCT GLUCOSE: 147 MG/DL (ref 70–110)
POCT GLUCOSE: 201 MG/DL (ref 70–110)
POCT GLUCOSE: 240 MG/DL (ref 70–110)
POCT GLUCOSE: 242 MG/DL (ref 70–110)
POCT GLUCOSE: 289 MG/DL (ref 70–110)
POTASSIUM SERPL-SCNC: 4.4 MMOL/L
POTASSIUM SERPL-SCNC: 4.8 MMOL/L
POTASSIUM SERPL-SCNC: 5.8 MMOL/L
PROCALCITONIN SERPL IA-MCNC: 2.4 NG/ML
PROT SERPL-MCNC: 5 G/DL
RBC # BLD AUTO: 3.74 M/UL
RETIRED EF AND QEF - SEE NOTES: 65 (ref 55–65)
SODIUM SERPL-SCNC: 150 MMOL/L
SODIUM SERPL-SCNC: 151 MMOL/L
SODIUM SERPL-SCNC: 152 MMOL/L
VANCOMYCIN SERPL-MCNC: 14.9 UG/ML
WBC # BLD AUTO: 14.6 K/UL

## 2018-07-10 PROCEDURE — G8996 SWALLOW CURRENT STATUS: HCPCS | Mod: CK

## 2018-07-10 PROCEDURE — 80048 BASIC METABOLIC PNL TOTAL CA: CPT | Mod: 91

## 2018-07-10 PROCEDURE — 63600175 PHARM REV CODE 636 W HCPCS: Performed by: STUDENT IN AN ORGANIZED HEALTH CARE EDUCATION/TRAINING PROGRAM

## 2018-07-10 PROCEDURE — 93306 TTE W/DOPPLER COMPLETE: CPT | Mod: 26,,, | Performed by: INTERNAL MEDICINE

## 2018-07-10 PROCEDURE — 83930 ASSAY OF BLOOD OSMOLALITY: CPT

## 2018-07-10 PROCEDURE — 80048 BASIC METABOLIC PNL TOTAL CA: CPT

## 2018-07-10 PROCEDURE — 80053 COMPREHEN METABOLIC PANEL: CPT

## 2018-07-10 PROCEDURE — 25000003 PHARM REV CODE 250: Performed by: STUDENT IN AN ORGANIZED HEALTH CARE EDUCATION/TRAINING PROGRAM

## 2018-07-10 PROCEDURE — 92523 SPEECH SOUND LANG COMPREHEN: CPT

## 2018-07-10 PROCEDURE — 93005 ELECTROCARDIOGRAM TRACING: CPT

## 2018-07-10 PROCEDURE — 94761 N-INVAS EAR/PLS OXIMETRY MLT: CPT

## 2018-07-10 PROCEDURE — 94644 CONT INHLJ TX 1ST HOUR: CPT

## 2018-07-10 PROCEDURE — 83605 ASSAY OF LACTIC ACID: CPT

## 2018-07-10 PROCEDURE — 99223 1ST HOSP IP/OBS HIGH 75: CPT | Mod: ,,, | Performed by: FAMILY MEDICINE

## 2018-07-10 PROCEDURE — G8997 SWALLOW GOAL STATUS: HCPCS | Mod: CJ

## 2018-07-10 PROCEDURE — S5010 5% DEXTROSE AND 0.45% SALINE: HCPCS | Performed by: STUDENT IN AN ORGANIZED HEALTH CARE EDUCATION/TRAINING PROGRAM

## 2018-07-10 PROCEDURE — 83935 ASSAY OF URINE OSMOLALITY: CPT

## 2018-07-10 PROCEDURE — 83615 LACTATE (LD) (LDH) ENZYME: CPT

## 2018-07-10 PROCEDURE — 85025 COMPLETE CBC W/AUTO DIFF WBC: CPT

## 2018-07-10 PROCEDURE — 36415 COLL VENOUS BLD VENIPUNCTURE: CPT

## 2018-07-10 PROCEDURE — 92610 EVALUATE SWALLOWING FUNCTION: CPT

## 2018-07-10 PROCEDURE — 25000242 PHARM REV CODE 250 ALT 637 W/ HCPCS: Performed by: STUDENT IN AN ORGANIZED HEALTH CARE EDUCATION/TRAINING PROGRAM

## 2018-07-10 PROCEDURE — 80202 ASSAY OF VANCOMYCIN: CPT

## 2018-07-10 PROCEDURE — 82140 ASSAY OF AMMONIA: CPT

## 2018-07-10 PROCEDURE — 20000000 HC ICU ROOM

## 2018-07-10 PROCEDURE — 93306 TTE W/DOPPLER COMPLETE: CPT

## 2018-07-10 RX ORDER — CALCIUM CHLORIDE INJECTION 100 MG/ML
1 INJECTION, SOLUTION INTRAVENOUS ONCE
Status: DISCONTINUED | OUTPATIENT
Start: 2018-07-10 | End: 2018-07-10

## 2018-07-10 RX ORDER — ASPIRIN 81 MG/1
81 TABLET ORAL DAILY
Status: DISCONTINUED | OUTPATIENT
Start: 2018-07-10 | End: 2018-07-11 | Stop reason: HOSPADM

## 2018-07-10 RX ORDER — HYDRALAZINE HYDROCHLORIDE 20 MG/ML
10 INJECTION INTRAMUSCULAR; INTRAVENOUS EVERY 8 HOURS PRN
Status: DISCONTINUED | OUTPATIENT
Start: 2018-07-10 | End: 2018-07-11 | Stop reason: HOSPADM

## 2018-07-10 RX ORDER — ALBUTEROL SULFATE 2.5 MG/.5ML
10 SOLUTION RESPIRATORY (INHALATION) ONCE
Status: COMPLETED | OUTPATIENT
Start: 2018-07-10 | End: 2018-07-10

## 2018-07-10 RX ORDER — DEXTROSE MONOHYDRATE 50 MG/ML
INJECTION, SOLUTION INTRAVENOUS CONTINUOUS
Status: DISCONTINUED | OUTPATIENT
Start: 2018-07-10 | End: 2018-07-10

## 2018-07-10 RX ORDER — SODIUM CHLORIDE, SODIUM LACTATE, POTASSIUM CHLORIDE, CALCIUM CHLORIDE 600; 310; 30; 20 MG/100ML; MG/100ML; MG/100ML; MG/100ML
INJECTION, SOLUTION INTRAVENOUS CONTINUOUS
Status: DISCONTINUED | OUTPATIENT
Start: 2018-07-10 | End: 2018-07-11

## 2018-07-10 RX ORDER — LINEZOLID 2 MG/ML
600 INJECTION, SOLUTION INTRAVENOUS
Status: DISCONTINUED | OUTPATIENT
Start: 2018-07-10 | End: 2018-07-10

## 2018-07-10 RX ORDER — AMLODIPINE BESYLATE 5 MG/1
10 TABLET ORAL DAILY
Status: DISCONTINUED | OUTPATIENT
Start: 2018-07-10 | End: 2018-07-11 | Stop reason: HOSPADM

## 2018-07-10 RX ADMIN — HEPARIN SODIUM 5000 UNITS: 5000 INJECTION, SOLUTION INTRAVENOUS; SUBCUTANEOUS at 01:07

## 2018-07-10 RX ADMIN — PIPERACILLIN AND TAZOBACTAM 4.5 G: 4; .5 INJECTION, POWDER, LYOPHILIZED, FOR SOLUTION INTRAVENOUS; PARENTERAL at 05:07

## 2018-07-10 RX ADMIN — HYDRALAZINE HYDROCHLORIDE 10 MG: 20 INJECTION INTRAMUSCULAR; INTRAVENOUS at 04:07

## 2018-07-10 RX ADMIN — HYDRALAZINE HYDROCHLORIDE 10 MG: 20 INJECTION INTRAMUSCULAR; INTRAVENOUS at 01:07

## 2018-07-10 RX ADMIN — OCTREOTIDE ACETATE 250 MCG/HR: 1000 INJECTION, SOLUTION INTRAVENOUS; SUBCUTANEOUS at 01:07

## 2018-07-10 RX ADMIN — ALBUTEROL SULFATE 10 MG: 2.5 SOLUTION RESPIRATORY (INHALATION) at 04:07

## 2018-07-10 RX ADMIN — SODIUM CHLORIDE, SODIUM LACTATE, POTASSIUM CHLORIDE, AND CALCIUM CHLORIDE: .6; .31; .03; .02 INJECTION, SOLUTION INTRAVENOUS at 09:07

## 2018-07-10 RX ADMIN — ASPIRIN 81 MG: 81 TABLET, COATED ORAL at 02:07

## 2018-07-10 RX ADMIN — DEXTROSE AND SODIUM CHLORIDE: 5; .45 INJECTION, SOLUTION INTRAVENOUS at 04:07

## 2018-07-10 RX ADMIN — INSULIN ASPART 6 UNITS: 100 INJECTION, SOLUTION INTRAVENOUS; SUBCUTANEOUS at 06:07

## 2018-07-10 RX ADMIN — AMLODIPINE BESYLATE 10 MG: 5 TABLET ORAL at 02:07

## 2018-07-10 RX ADMIN — HEPARIN SODIUM 5000 UNITS: 5000 INJECTION, SOLUTION INTRAVENOUS; SUBCUTANEOUS at 09:07

## 2018-07-10 RX ADMIN — HEPARIN SODIUM 5000 UNITS: 5000 INJECTION, SOLUTION INTRAVENOUS; SUBCUTANEOUS at 05:07

## 2018-07-10 RX ADMIN — INSULIN ASPART 2 UNITS: 100 INJECTION, SOLUTION INTRAVENOUS; SUBCUTANEOUS at 11:07

## 2018-07-10 RX ADMIN — SODIUM CHLORIDE, SODIUM LACTATE, POTASSIUM CHLORIDE, AND CALCIUM CHLORIDE: .6; .31; .03; .02 INJECTION, SOLUTION INTRAVENOUS at 11:07

## 2018-07-10 RX ADMIN — DEXTROSE MONOHYDRATE: 5 INJECTION, SOLUTION INTRAVENOUS at 10:07

## 2018-07-10 RX ADMIN — CALCIUM GLUCONATE 1000 MG: 98 INJECTION, SOLUTION INTRAVENOUS at 04:07

## 2018-07-10 NOTE — PROGRESS NOTES
"[facility] Medicine Progress Note    Primary Team: [facility] Hospitalist Team B  Attending Physician: Siddharth Howard MD  Resident: Ney Partida DO  Intern: Teddy Givens MD     Subjective:      Lala Ames is a 67 y.o. AA female who  has a past medical history of Anticoagulant long-term use; Arthritis; Blood transfusion; Coronary artery disease; Diabetes mellitus; Diabetes mellitus, type 2; Diabetic retinopathy; Disorder of kidney and ureter; H/O coronary angiogram; Hypertension; Liver cancer; Lung metastases (2017); MI (myocardial infarction); Nausea (2017); Neuroendocrine carcinoma, unknown primary site (2017); PDR (proliferative diabetic retinopathy); Secondary malignant carcinoid tumor of liver; and Secondary neuroendocrine tumor of liver (2017).. The patient presented to [facility] on 2018 with a primary complaint of Fatigue (pt had a femoral artery angiogram procedure on . Has been lethargic since then. Called Dr. Dorantes and was told to come to the ER. Family member reports pt was hyperglycemic last night, but that it improved today).    Overnight, she was in the ICU, endorses increased pain in her right side. She denies N/V/D, chest pain, SOB.      Objective:     Last 24 Hour Vital Signs:  BP  Min: 142/65  Max: 234/102  Temp  Av.8 °F (36.6 °C)  Min: 97.4 °F (36.3 °C)  Max: 98.2 °F (36.8 °C)  Pulse  Av.2  Min: 55  Max: 89  Resp  Avg: 15.1  Min: 10  Max: 28  SpO2  Av.3 %  Min: 97 %  Max: 100 %  Height  Av' 10" (147.3 cm)  Min: 4' 10" (147.3 cm)  Max: 4' 10" (147.3 cm)  Weight  Av.2 kg (128 lb 6.2 oz)  Min: 56.6 kg (124 lb 12.5 oz)  Max: 59.9 kg (132 lb)  I/O last 3 completed shifts:  In: 1713.3 [I.V.:1513.3; IV Piggyback:200]  Out: 1430 [Urine:1430]    Physical Examination:  General appearance: alert, appears stated age and cooperative  Neck: no adenopathy, no carotid bruit, no JVD, supple, symmetrical, trachea midline " and thyroid not enlarged, symmetric, no tenderness/mass/nodules  Lungs: clear to auscultation bilaterally  Heart: regular rate and rhythm, S1, S2 normal, no murmur, click, rub or gallop  Abdomen: soft, non-tender; bowel sounds normal; no masses,  no organomegaly  Extremities: extremities normal, atraumatic, no cyanosis or edema  Pulses: 2+ and symmetric  Skin: Skin color, texture, turgor normal. No rashes or lesions      Laboratory:  Laboratory Data Reviewed: yes    Microbiology Data Reviewed: yes  Pertinent Findings:  Blood cx: no growth  Urine cx: pending    Other Results:      Radiology Data Reviewed: yes    Current Medications:     Infusions:   dextrose 5 % and 0.45 % NaCl 200 mL/hr at 07/10/18 0417        Scheduled:   heparin (porcine)  5,000 Units Subcutaneous Q8H    piperacillin-tazobactam (ZOSYN) IVPB  4.5 g Intravenous Q12H        PRN:  dextrose 50%, dextrose 50%, glucagon (human recombinant), glucose, glucose, hydrALAZINE, insulin aspart U-100, ondansetron, sodium chloride 0.9%    Antibiotics and Day Number of Therapy:  Vanc/zosyn: day 2    Lines and Day Number of Therapy:  PIV, ortega    Assessment:     Lala Ames is a 67 y.o.female with  Patient Active Problem List    Diagnosis Date Noted    Cerebrovascular accident (CVA) 07/09/2018    Sepsis with multi-organ dysfunction 07/09/2018    Neuroendocrine tumor 06/29/2018    Dysphagia 12/14/2017    Gastritis 12/14/2017    Lung metastases 12/05/2017    Neuroendocrine carcinoma, 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    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    Nausea 11/21/2017    History of iron deficiency anemia 10/31/2017    Neuroendocrine cancer 08/07/2017    Metastatic cancer to liver 07/28/2017    Nausea 07/26/2017    Hyperkalemia 07/25/2017    Acute pyelonephritis 07/25/2017    Liver mass 07/17/2017    Nephrolithiasis 07/17/2017    Hypertension 07/09/2017    DISH (diffuse idiopathic skeletal hyperostosis)  06/12/2017    Diastolic dysfunction 11/09/2016    Low vitamin D level 11/09/2016    Nephrotic syndrome 11/09/2016    Proliferative diabetic retinopathy associated with type 2 diabetes mellitus 04/01/2016    Uncontrolled diabetes mellitus 03/01/2016    Diabetic macular edema 01/15/2016    Colon cancer screening 05/25/2015    Colon polyp 05/25/2015    Noncompliance with diabetes treatment 11/26/2014    DM (diabetes mellitus), type 2, uncontrolled 11/05/2014    Nuclear sclerosis of both eyes 10/17/2014    History of non-ST elevation myocardial infarction (NSTEMI) 08/29/2014    Stable angina 08/29/2014    Cardiomegaly 08/29/2014    HTN (hypertension) 08/29/2014    MVP (mitral valve prolapse) 08/29/2014    Obesity, Class II, BMI 35-39.9 08/29/2014    CKD (chronic kidney disease) stage 4, GFR 15-29 ml/min 08/29/2014    Vaginal yeast infection 07/25/2014    Non compliance w medication regimen 07/25/2014    H/O hysterectomy for benign disease 07/25/2014    Postmenopausal vaginal bleeding 07/25/2014    Mixed urge and stress incontinence 07/25/2014    Hyperlipidemia 08/20/2013    CAD (coronary artery disease) 08/19/2013        Plan:     Sepis, multiorgan failure:  - WBC 25.68, RR20 on admission  -  BUN/Cr: 108/3.2 (baseline 2.5) on admission  - NA/K: 154/5.1, on admission   - Alk Phos: 293; ALT/AST: 241/63; Lipase 158; TBili: 2.8 on admission   - arrived confused, which since resolved now alert and orientated x3   - admitted to the ICU  - on Zosyn/vanc to cover unknown source  - blood cx 2x and urine cx pending    Stroke vs infarct  - CT revealed basal ganglia stroke older than 12 hours  - will order MRI head in the morning for further evaluation  - heparin drip  - Neuro consult     Transaminitis:  -AST/ALT: 241/63; Tbili: 2.8; Albumin: 2.9; Total Protein: 6.5; Alk phos: 293; Calcium: 9.2, on admission  - pt displays scleral icterus   - pt admitted to ICU  - monitoring labs: today: ALT/AST: 144/40;  alk phos: 183  - will check ammonia levels in the morning     BREONNA:  - BUN/Cr: 108/2.9 currently, was 108/3.2 on admission  - Na/K: 151/4.8, on admission: 154/5.1  - decreased PO intake + TACE procedure with contrast  - monitoring labs in the ICU     Hypothyroidism:  - TSH/T4: 0.277/0.69  - will continue to monitor    DMII:  - hypoglycemic overnight, hyperglycemic this morning at 243  - HA1C: 5.6  - PRN insulin ordered      HTN:  - hypertensive in the Unit  - meds pending Neuro consult   - hydralazine 10 mg PRN     DVT: heparin   Diet: NPO  Disposition: poor, ICU for monitoring of multiorgan failure        Teddy Givens MD  [facility]    [facility] Medicine Hospitalist Pager numbers:   [facility] Hospitalist Medicine Team A (Dhara/Sudhakar): [phone]  [facility] Hospitalist Medicine Team B (Lee/Jayson):  [phone]

## 2018-07-10 NOTE — H&P
[facility] Medicine H&P Note     Admitting Team: \[facility]\"" Hospitalist Team B  Attending Physician: Siddharth Howard MD  Resident: Ney Partida DO  Intern: Teddy Givens MD    Date of Admit: 7/9/2018    Chief Complaint     Fatigue (pt had a femoral artery angiogram procedure on 6/29. Has been lethargic since then. Called Dr. Dorantes and was told to come to the ER. Family member reports pt was hyperglycemic last night, but that it improved today)   Family at bedside describe confusion for 1 week.     Subjective:      History of Present Illness:  Lala Ames is a 67 y.o. 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 The patient presented to [facility] on 7/9/2018 with a primary complaint of Fatigue (pt had a femoral artery angiogram procedure on 6/29. Has been lethargic since then. Called Dr. Dorantes and was told to come to the ER. Family member reports pt was hyperglycemic last night, but that it improved today)  And confusion. She had a TACE procedure done last week, and since then the family has described her as not orientated to place, making inappropriate statements and at times being combative and lethargic at other times. Daughter, who is primary caregiver, stopped oxycodone but pt remained confused. She describes decreased PO intake, decreased urine output. Pt denies nausea, vomiting, fevers, chills, SOB or chest pain.   Pt was brought to the ED for confusion, where  she received IV fluids. When I went to see her she was orientated to person, place and time. She was not in any pain, but was generally fatigued and uncomfortable.         Past Medical History:  Past Medical History:   Diagnosis Date    Anticoagulant long-term use     Arthritis     Blood transfusion     Coronary artery disease     Diabetes mellitus     Diabetes mellitus, type 2     Diabetic retinopathy     Disorder of kidney and ureter     H/O coronary angiogram     Hypertension     Liver cancer     Lung metastases 12/5/2017    MI (myocardial infarction)     x3    Nausea 11/21/2017    Neuroendocrine carcinoma, 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    PDR (proliferative diabetic retinopathy)     Secondary malignant carcinoid tumor of liver     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       Past Surgical History:  Past Surgical History:   Procedure Laterality Date    ABDOMINAL SURGERY      bowel had to be repaired after hysterectomy    CARDIAC CATHETERIZATION      COLECTOMY      r/t bowel injury from hysterectomy    COLONOSCOPY      CYST REMOVAL      soft tissue on forehead    EMBOLIZATION N/A 6/29/2018    Procedure: Embolization;  Surgeon: Dheeraj Diagnostic Provider;  Location: [facility] OR [address] [place] [address];  Service: General;  Laterality: N/A;    EYE SURGERY      HERNIA REPAIR      x2    HYSTERECTOMY      LIVER BIOPSY  07/2017       Allergies:  Review of patient's allergies indicates:   Allergen Reactions    Epinephrine     Diltiazem Rash and Other (See Comments)     Skin peels    Morphine Rash       Home Medications:  Prior to Admission medications    Medication Sig Start Date End Date Taking? Authorizing Provider   amLODIPine (NORVASC) 10 MG tablet Take 1 tablet (10 mg total) by mouth once daily. 7/2/18 7/2/19 Yes Nidia Melnedez NP   amoxicillin-pot clavulanate 250-62.5 mg/5ml (AUGMENTIN) 250-62.5 mg/5 mL suspension Take 10 mLs (500 mg total) by mouth 2 (two) times daily. 7/5/18  Yes Doyle  ERIC Dorantes, DO, FACP   aspirin (ECOTRIN) 81 MG EC tablet Take 1 tablet (81 mg total) by mouth once daily. 11/9/16  Yes Víctor Sheets MD   ergocalciferol (ERGOCALCIFEROL) 50,000 unit Cap Take 1 capsule (50,000 Units total) by mouth every 7 days. 11/9/16  Yes Víctor Sheets MD   blood sugar diagnostic (BLOOD GLUCOSE TEST) Strp 1 strip by Misc.(Non-Drug; Combo Route) route 3 (three) times daily as needed. 4/26/16   Nidia Melendez NP   blood-glucose meter (FREESTYLE SYSTEM KIT) kit 1 each by Other route 2 (two) times daily. Use as instructed 2/7/18   Nidia Melendez NP   lancets (ONETOUCH ULTRASOFT LANCETS) Misc 1 each by Misc.(Non-Drug; Combo Route) route 2 (two) times daily as needed. 2/7/18   Nidia Melendez NP   nitroGLYCERIN (NITROSTAT) 0.4 MG SL tablet Place 1 tablet (0.4 mg total) under the tongue every 5 (five) minutes as needed for Chest pain. 5/11/17 5/11/18  Víctor Sheets MD   ondansetron (ZOFRAN) 4 MG tablet Take 1 tablet (4 mg total) by mouth every 6 (six) hours as needed for Nausea. 1/11/18   Buck Dvaison MD   ondansetron (ZOFRAN-ODT) 4 MG TbDL Dissolve 1 tablet (4 mg total) by mouth every 6 (six) hours as needed. 6/29/18   Tadeo Woods MD   oxyCODONE (ROXICODONE) 10 mg Tab immediate release tablet Take 1 tablet (10 mg total) by mouth every 3 (three) hours as needed for Pain. 6/30/18   Ehsan Barrow MD   oxyCODONE (ROXICODONE) 5 MG immediate release tablet Take 1 tablet (5 mg total) by mouth every 6 (six) hours as needed for Pain. 6/29/18   Tadeo Woods MD   promethazine (PHENERGAN) 12.5 MG Supp Place 1 suppository (12.5 mg total) rectally every 6 (six) hours as needed. 11/21/17   Doyle Dorantes DO, FACP   ranolazine (RANEXA) 500 MG Tb12 Take 1 tablet (500 mg total) by mouth 2 (two) times daily. 6/13/18 6/13/19  Yang Child NP       Family History:  Family History   Problem Relation Age of Onset    Mental illness Mother     Diabetes Father      "Cancer Father         pancreatic    Diabetes Sister     Stomach cancer Sister     Cancer Sister         gastric    Kidney disease Brother     Diabetes Daughter     No Known Problems Daughter     No Known Problems Son     No Known Problems Son     No Known Problems Brother     No Known Problems Brother     No Known Problems Brother     Cancer Maternal Aunt     Cancer Maternal Aunt        Social History:  Social History   Substance Use Topics    Smoking status: Never Smoker    Smokeless tobacco: Never Used    Alcohol use No      Comment: Occasionally       Review of Systems:  Pertinent items are noted in HPI. All other systems are reviewed and are negative.    Health Maintaince :   Primary Care Physician: Al       Objective:   Last 24 Hour Vital Signs:  BP  Min: 164/77  Max: 190/88  Temp  Av.9 °F (36.6 °C)  Min: 97.4 °F (36.3 °C)  Max: 98.2 °F (36.8 °C)  Pulse  Av.8  Min: 72  Max: 83  Resp  Av.2  Min: 16  Max: 20  SpO2  Av.8 %  Min: 98 %  Max: 100 %  Height  Av' 10" (147.3 cm)  Min: 4' 10" (147.3 cm)  Max: 4' 10" (147.3 cm)  Weight  Av.9 kg (132 lb)  Min: 59.9 kg (132 lb)  Max: 59.9 kg (132 lb)  Body mass index is 27.59 kg/m².  No intake/output data recorded.    Physical Examination:    BP (!) 190/88   Pulse 81   Temp 98 °F (36.7 °C) (Oral)   Resp 16   Ht 4' 10" (1.473 m)   Wt 59.9 kg (132 lb)   LMP 1998 (Exact Date)   SpO2 98%   BMI 27.59 kg/m²     General Appearance:    Alert, cooperative, mild distress, appears stated age   Head:    Normocephalic, without obvious abnormality, atraumatic   Eyes:    PERRL, mild scleral icterus, EOM's intact, fundi     benign, both eyes       Nose:   Nares normal, septum midline, mucosa normal, no drainage    or sinus tenderness   Throat:   Lips, mucosa, and tongue normal; teeth and gums normal   Neck:   Supple, symmetrical, trachea midline, no adenopathy;     thyroid:  no enlargement/tenderness/nodules; no carotid    " bruit or JVD   Back:     Symmetric, no curvature, ROM normal, no CVA tenderness   Lungs:     Clear to auscultation bilaterally, respirations unlabored   Chest Wall:    No tenderness or deformity    Heart:    Regular rate and rhythm, S1 and S2 normal, no murmur, rub   or gallop       Abdomen:     Soft, non-tender, bowel sounds active all four quadrants,     no masses, no organomegaly           Extremities:   Extremities normal, atraumatic, no cyanosis or edema   Pulses:   2+ and symmetric all extremities   Skin:   Skin color, texture, turgor normal, no rashes or lesions   Lymph nodes:   Cervical, supraclavicular, and axillary nodes normal   Neurologic:   CNII-XII intact, global decreased strength, sensation and reflexes intact     throughout         Laboratory:  Most Recent Data:  CBC: Lab Results   Component Value Date    WBC 25.68 (H) 07/09/2018    HGB 15.1 07/09/2018    HCT 45.6 07/09/2018     07/09/2018    MCV 86 07/09/2018    RDW 14.6 (H) 07/09/2018     WBC Differential:   BMP: Lab Results   Component Value Date     (H) 07/09/2018    K 5.4 (H) 07/09/2018     (H) 07/09/2018    CO2 21 (L) 07/09/2018     (H) 07/09/2018    CREATININE 3.7 (H) 07/09/2018    GLU 62 (L) 07/09/2018    CALCIUM 9.5 07/09/2018    MG 1.8 07/27/2017    PHOS 3.3 10/26/2017     LFTs: Lab Results   Component Value Date    PROT 6.5 07/09/2018    ALBUMIN 2.9 (L) 07/09/2018    BILITOT 2.8 (H) 07/09/2018    AST 63 (H) 07/09/2018    ALKPHOS 293 (H) 07/09/2018     (H) 07/09/2018     Coags:   Lab Results   Component Value Date    INR 1.0 06/29/2018     FLP: Lab Results   Component Value Date    CHOL 243 (H) 10/26/2017    HDL 58 10/26/2017    LDLCALC 150.8 10/26/2017    TRIG 171 (H) 10/26/2017    CHOLHDL 23.9 10/26/2017     DM: Lab Results   Component Value Date    HGBA1C 5.3 06/29/2018    HGBA1C 5.3 06/13/2018    HGBA1C 5.9 03/15/2018    LDLCALC 150.8 10/26/2017    CREATININE 3.7 (H) 07/09/2018     Thyroid: Lab Results    Component Value Date    TSH 1.270 07/25/2017     Anemia: Lab Results   Component Value Date    IRON 45 08/07/2017    TIBC 246 (L) 08/07/2017    FERRITIN 100 08/07/2017    TNRCJMSU50 729 06/11/2008    FOLATE 10.8 03/11/2008     Cardiac: Lab Results   Component Value Date    TROPONINI 0.072 (H) 07/09/2018    BNP 35 09/19/2017     Urinalysis: Lab Results   Component Value Date    LABURIN No significant growth 09/19/2017    COLORU Yellow 07/09/2018    SPECGRAV 1.025 07/09/2018    NITRITE Negative 07/09/2018    PROTEINUR >=300MG/DL 09/19/2017    KETONESU Negative 07/09/2018    UROBILINOGEN Negative 07/09/2018    BILIRUBINUR NEGATIVE 09/19/2017    WBCUA 4 07/09/2018       Trended Lab Data:    Recent Labs  Lab 07/09/18  1552   WBC 25.68*   HGB 15.1   HCT 45.6      MCV 86   RDW 14.6*   *   K 5.4*   *   CO2 21*   *   CREATININE 3.7*   GLU 62*   PROT 6.5   ALBUMIN 2.9*   BILITOT 2.8*   AST 63*   ALKPHOS 293*   *       Trended Cardiac Data:    Recent Labs  Lab 07/09/18  1552   TROPONINI 0.072*       Microbiology Data:  Blood cx 2x: pending  Urine cx: pending     Other Results:  EKG (my interpretation): old T wave changes, no acute findings     Radiology:  Imaging Results          CT Abdomen Pelvis  Without Contrast (Final result)  Result time 07/09/18 20:47:37    Final result by Renetta Clarke MD (07/09/18 20:47:37)                 Impression:      1. Hepatomegaly with known diffuse hepatic metastatic disease.  2. Otherwise no acute intra-abdominal abnormalities identified.  3. High density material within the gallbladder, presumed biliary sludge.  4. Punctate nonobstructing right renal stone.  5. Postsurgical changes and additional findings as detailed above.      Electronically signed by: Renetta Clarke MD  Date:    07/09/2018  Time:    20:47             Narrative:    EXAMINATION:  CT ABDOMEN PELVIS WITHOUT CONTRAST    CLINICAL HISTORY:  abdominal pain;    TECHNIQUE:  Low dose axial  images, sagittal and coronal reformations were obtained from the lung bases to the pubic symphysis.  Oral contrast was not administered.    COMPARISON:  Multiple prior CT and abdominal MRI, last CT dated 04/30/2018.    FINDINGS:  The visualized portion of the heart is unremarkable.  There is minimal right basilar atelectasis.    Liver is enlarged measuring 24 cm with innumerable hypodense hepatic lesions seen consistent with known history of metastatic disease in this patient with history of neuroendocrine tumor.  There is no intra-or extrahepatic biliary ductal dilatation.  High density material, presumable sludge is seen within the gallbladder.  The stomach, pancreas, spleen, and right adrenal gland are unremarkable.  There is stable left adrenal thickening.    Single punctate nonobstructing stone is seen within the right kidney.  No left renal stones.  No evidence of hydronephrosis.  Ureters are difficult to track, however no definite stones are seen along their expected courses.  Urinary bladder is unremarkable.  Uterus has been removed.    Postsurgical changes are seen consistent with partial bowel resection.  The visualized loops of small and large bowel show no evidence of obstruction or inflammation.  Appendix is visualized and is unremarkable.  No free air or free fluid.    Aorta tapers normally.    No acute osseous abnormality identified. Subcutaneous soft tissue structures are unremarkable.                               CT Head Without Contrast (Final result)     Abnormal  Result time 07/09/18 17:38:58    Final result by Jose Enrique Richard MD (07/09/18 17:38:58)                 Impression:      Left basal ganglia (corpus stratum) suspected acute/subacute infarct.    No intracranial hemorrhage or acute large vascular territory cortical infarct.    This report was flagged in Epic as abnormal.      Electronically signed by: Jose Enrique Richard MD  Date:    07/09/2018  Time:    17:38             Narrative:     EXAMINATION:  CT HEAD WITHOUT CONTRAST    CLINICAL HISTORY:  Confusion/delirium, altered LOC, unexplained;    TECHNIQUE:  Low dose axial CT images obtained throughout the head without intravenous contrast. Sagittal and coronal reconstructions were performed.    COMPARISON:  None.    FINDINGS:  Intracranial compartment:    Mild generalized cerebral volume loss.  The ventricles are midline without distortion by mass effect or evidence of acute hydrocephalus.  No extra-axial blood or fluid collections.    There is an area of hypoattenuation without volume loss within the anterior limb of the left internal capsule which also extends to the left caudate and left subinsular region and anterior aspect of the left lenticular nucleus concerning for acute/subacute infarct.  No parenchymal mass, hemorrhage, edema or major vascular distribution infarct.    Skull/extracranial contents (limited evaluation): No fracture. Mastoid air cells and paranasal sinuses are essentially clear.  Visualized portions of the orbits are within normal limits.                               X-Ray Chest AP Portable (Final result)  Result time 07/09/18 14:56:06    Final result by Darwin Ashford MD (07/09/18 14:56:06)                 Impression:      1. No acute intrathoracic processes.      Electronically signed by: Darwin Ashford MD  Date:    07/09/2018  Time:    14:56             Narrative:    EXAMINATION:  XR CHEST AP PORTABLE    CLINICAL HISTORY:  Epigastric pain    TECHNIQUE:  Single frontal view of the chest was performed.    COMPARISON:  Chest 09/19/2017    FINDINGS:  There is a poor inspiratory effort when compared to the previous study.  No acute lobar consolidations or pneumothorax or pulmonary vascular congestion or pleural effusions.  Heart size is within normal limits.  There is faint atherosclerotic changes of the aortic arch.  There are spondylotic changes in the dorsal spine.                                     Assessment:       Patient  Active Problem List    Diagnosis Date Noted    Cerebrovascular accident (CVA) 07/09/2018    Sepsis with multi-organ dysfunction 07/09/2018    Neuroendocrine tumor 06/29/2018    Dysphagia 12/14/2017    Gastritis 12/14/2017    Lung metastases 12/05/2017    Neuroendocrine carcinoma, 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    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    Nausea 11/21/2017    History of iron deficiency anemia 10/31/2017    Neuroendocrine cancer 08/07/2017    Metastatic cancer to liver 07/28/2017    Nausea 07/26/2017    Hyperkalemia 07/25/2017    Acute pyelonephritis 07/25/2017    Liver mass 07/17/2017    Nephrolithiasis 07/17/2017    Hypertension 07/09/2017    DISH (diffuse idiopathic skeletal hyperostosis) 06/12/2017    Diastolic dysfunction 11/09/2016    Low vitamin D level 11/09/2016    Nephrotic syndrome 11/09/2016    Proliferative diabetic retinopathy associated with type 2 diabetes mellitus 04/01/2016    Uncontrolled diabetes mellitus 03/01/2016    Diabetic macular edema 01/15/2016    Colon cancer screening 05/25/2015    Colon polyp 05/25/2015    Noncompliance with diabetes treatment 11/26/2014    DM (diabetes mellitus), type 2, uncontrolled 11/05/2014    Nuclear sclerosis of both eyes 10/17/2014    History of non-ST elevation myocardial infarction (NSTEMI) 08/29/2014    Stable angina 08/29/2014    Cardiomegaly 08/29/2014    HTN (hypertension) 08/29/2014    MVP (mitral valve prolapse) 08/29/2014    Obesity, Class II, BMI 35-39.9 08/29/2014    CKD (chronic kidney disease) stage 4, GFR 15-29 ml/min 08/29/2014    Vaginal yeast infection 07/25/2014    Non compliance w medication regimen 07/25/2014    H/O hysterectomy for benign disease 07/25/2014    Postmenopausal vaginal bleeding 07/25/2014    Mixed urge and stress incontinence 07/25/2014    Hyperlipidemia 08/20/2013    CAD (coronary artery disease) 08/19/2013        Plan:     Hoa, multiorgan failure:  -  WBC 25.68, RR20  -  BUN/Cr: 108/3.2 (baseline 2.5)  - NA/K: 154/5.1  - hypoglycemic at 45  - Alk Phos: 293; ALT/AST: 241/63; Lipase 158; TBili: 2.8  - arrived confused   - admitted to the ICU  - continuous fluids, D50  - on Zosyn/vanc to cover unknown source  - blood cx 2x and urine cx pending    Stroke vs infarct  - CT revealed basal ganglia stroke older than 12 hours  - will order MRI head in the morning for further evaluation  - heparin for VTE ppx  - Neuro consult    Transaminitis:  -AST/ALT: 241/63; Tbili: 2.8; Albumin: 2.9; Total Protein: 6.5; Alk phos: 293; Calcium: 9.2  - pt displays scleral icterus   - pt admitted to ICU  - monitoring labs  - will check ammonia levels in the morning    BREONNA:  - BUN/Cr: 108/3.2  - Na/K: 154/5.1  - decreased PO intake + TACE procedure with contrast  - fluids, D50  - monitoring labs in the ICU    DMII:  - currently hypoglycemic at 45  - D50 ordered in the ICU  - PRN insulin ordered     HTN:  - hypertensive in the Unit  - meds pending Neuro consult      DVT: heparin   Diet: NPO  Disposition: poor, ICU for monitoring of multiorgan failure     Code Status:     full    Teddy Givens MD  [facility] Internal Medicine -I    [facility] Medicine Hospitalist Pager numbers:   [facility] Hospitalist Medicine Team A (Dhara/Sudhakar): [phone]  [facility] Hospitalist Medicine Team B (Lee/Jayson):  [phone]

## 2018-07-10 NOTE — PLAN OF CARE
Problem: Sepsis/Septic Shock (Adult)  Intervention: Provide Early Enteral Nutrition Therapy  Pt. on clear liquid diet.

## 2018-07-10 NOTE — PROGRESS NOTES
Results for GERARDO MAYA [facility] (MRN [id number]) as of 7/10/2018 00:32   Ref. Range 7/9/2018 21:47   POC PH Latest Ref Range: 7.35 - 7.45  7.348 (L)   POC PCO2 Latest Ref Range: 35 - 45 mmHg 37.3   POC PO2 Latest Ref Range: 80 - 100 mmHg 100   POC BE Latest Ref Range: -2 to 2 mmol/L -5   POC HCO3 Latest Ref Range: 24 - 28 mmol/L 20.5 (L)   POC SATURATED O2 Latest Ref Range: 95 - 100 % 97   POC TCO2 Latest Ref Range: 23 - 27 mmol/L 22 (L)   Sample Unknown ARTERIAL   DelSys Unknown Room Air   Allens Test Unknown N/A   Site Unknown LR   Results reported to Dr. Goins

## 2018-07-10 NOTE — CONSULTS
Consult Note  Palliative Care      Consult Requested By: Siddharth Howard MD  Reason for Consult:Goals of Care/Advance Care Planning     Thank you for the consult and the opportunity to participate in Mrs. Ames's care.     SUBJECTIVE:     History of Present Illness:  Disease Process: Sepsis with multiorgan failure; Stroke, Liver metastasis    Lala Ames is a 67 y.o. AA female who  admitted to [facility] on 7/9/2018. PMHx  Anticoagulant long-term use; Arthritis; Blood transfusion; Coronary artery disease; Diabetes mellitus; Diabetes mellitus, type 2; Diabetic retinopathy; Disorder of kidney and ureter; H/O coronary angiogram; Hypertension; Liver cancer; Lung metastases (12/5/2017); MI (myocardial infarction); Nausea (11/21/2017); Neuroendocrine carcinoma, unknown primary site (11/21/2017); PDR (proliferative diabetic retinopathy); Secondary malignant carcinoid tumor of liver; and Secondary neuroendocrine tumor of liver (11/21/2017). She presented to ED primary complaint of Fatigue (pt had a femoral artery angiogram procedure on 6/29. Has been lethargic since then. Called Dr. Dorantes and was told to come to the ER. . She had a TACE procedure done last week, and since then the family has described her as not orientated to place, making inappropriate statements and at times being combative and lethargic at other times. Daughter, who is primary caregiver, stopped oxycodone but pt remained confused. She describes decreased PO intake, decreased urine output. Pt denies nausea, vomiting, fevers, chills, SOB or chest pain.       Palliative Medicine was consulted for Goals of Care/Advance Care Planning.      Palliative care met with family today. Large family that consist of , 2 sons, 1 daughter, 1 sister, 3 sister in laws, 3 grandchildren, 1 nephew all present at the meeting. Discussions began with Taya (daughter) telling her understanding of her mothers disease state and prognosis. Very  "aware. The whole family was aware of Ms. Ames's condition and prognosis.   Sister (Yuridia) stated, "My sister told me she does not want to do anything artificially. She wants to be at home with family. Not die alone in hospital." Her son (Coreen) verbalized the same, also daughter.  Mr. Ames (spouse) have him and his wife spoken about what her wishes are. He began to cry and stated she does not want all the machines. The whole family began to cry. Emotional support provided.     Palliative care educated family on hospice services and types. The family was in total agreement with taking patient home with hospice.All family members are in agreement to care for patient at home until she transcends.     Palliative care met patient to establish care. Patient ill looking and stating she is tired..Patient stated she did not want dialysis or to be resituated in the presence of family.  DNR was signed. Family very emotional and emotional support given.    Case management notified of family decision.               Past Medical History:   Diagnosis Date    Anticoagulant long-term use     Arthritis     Blood transfusion     Coronary artery disease     Diabetes mellitus     Diabetes mellitus, type 2     Diabetic retinopathy     Disorder of kidney and ureter     H/O coronary angiogram     Hypertension     Liver cancer     Lung metastases 12/5/2017    MI (myocardial infarction)     x3    Nausea 11/21/2017    Neuroendocrine carcinoma, 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    PDR (proliferative diabetic retinopathy)     Secondary malignant carcinoid tumor of liver     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     Past Surgical History:   Procedure Laterality Date    ABDOMINAL SURGERY      bowel had to be repaired after hysterectomy    CARDIAC CATHETERIZATION      COLECTOMY      r/t bowel injury from hysterectomy    COLONOSCOPY      CYST REMOVAL      soft tissue on forehead    EMBOLIZATION N/A " 6/29/2018    Procedure: Embolization;  Surgeon: Dheeraj Diagnostic Provider;  Location: [facility] OR [address] [place] [address];  Service: General;  Laterality: N/A;    EYE SURGERY      HERNIA REPAIR      x2    HYSTERECTOMY      LIVER BIOPSY  07/2017     Family History   Problem Relation Age of Onset    Mental illness Mother     Diabetes Father     Cancer Father         pancreatic    Diabetes Sister     Stomach cancer Sister     Cancer Sister         gastric    Kidney disease Brother     Diabetes Daughter     No Known Problems Daughter     No Known Problems Son     No Known Problems Son     No Known Problems Brother     No Known Problems Brother     No Known Problems Brother     Cancer Maternal Aunt     Cancer Maternal Aunt      Social History   Substance Use Topics    Smoking status: Never Smoker    Smokeless tobacco: Never Used    Alcohol use No      Comment: Occasionally       Mental Status: Oriented x3    ECOG Performance Status Grade: 4 - Completely disabled      OBJECTIVE:     Pain Assessment: No pain reported at this time    Decision-Making Capacity: Patient answered questions, Family answered questions    Advanced Directives:  Living Will: No  Do Not Resuscitate Status: Yes  Medical Power of : No  Registered Organ Donor: Yes    Living Arrangements: Lives with spouse        ASSESSMENT/PLAN:     Recommendations:  Continue medical treatment transitioning to comfort care  Code status: DNR  Family discharge home with hospice  Palliative care will continue to assist patient and family with the goals of care.      Palliative care will continue to follow.     Thank you for the consult and the opportunity to participate in  Mrs. Ames's care.       Mag Mckeon, MSN, APRN, NP-C   Palliative Medicine Team  [phone]        >50% of 120 min visit spent in chart review, face to face discussion of goals of care with patient, family, symptom assessment, coordination of care and emotional support.

## 2018-07-10 NOTE — MEDICAL/APP STUDENT
"[facility] Medicine H&P Note     Admitting Team: [facility] Hospitalist Team B  Attending Physician: MD Lee  Resident: Ney Partida DO  Intern: Teddy Givens MD     Date of Admit: 7/9/2018    Chief Complaint       Fatigue (pt had a femoral artery angiogram procedure on 6/29. Has been lethargic since then. Called Dr. Dorantes and was told to come to the ER. Family member reports pt was hyperglycemic last night, but that it improved today)      Subjective:      History of Present Illness:  Lala Ames is a 67 y.o. female that presents with fatigue and weakness for 5 days. She had an embolization procedure of a neuroendocrine tumor done on 6/29. She was taking oxycodone after the procedure. The pt's daughter states that the pt stopped taking the oxycodone because the daughter thought the medication was making the pt fatigue. About 5 days ago the daughter noticed the patient had fever, chills, night sweats, confusion. The daughter describes the patient having trouble walking because of her weakness. The daughter also states that the patient was not acting herself. The daughter said that patient would "spit water in her food and say things that did not make sense." She denies chest pain, shortness of breath, headache. She has been eating and drinking very little. She has been producing small amount of urine. 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 The " patient presented to [facility] on 7/9/2018 with a primary complaint of Fatigue (pt had a femoral artery angiogram procedure on 6/29. Has been lethargic since then. Called Dr. Dorantes and was told to come to the ER. Family member reports pt was hyperglycemic last night, but that it improved today)      Past Medical History:  Past Medical History:   Diagnosis Date    Anticoagulant long-term use     Arthritis     Blood transfusion     Coronary artery disease     Diabetes mellitus     Diabetes mellitus, type 2     Diabetic retinopathy     Disorder of kidney and ureter     H/O coronary angiogram     Hypertension     Liver cancer     Lung metastases 12/5/2017    MI (myocardial infarction)     x3    Nausea 11/21/2017    Neuroendocrine carcinoma, 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    PDR (proliferative diabetic retinopathy)     Secondary malignant carcinoid tumor of liver     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       Past Surgical History:  Past Surgical History:   Procedure Laterality Date    ABDOMINAL SURGERY      bowel had to be repaired after hysterectomy    CARDIAC CATHETERIZATION      COLECTOMY      r/t bowel injury from hysterectomy    COLONOSCOPY      CYST REMOVAL      soft tissue on forehead    EMBOLIZATION N/A 6/29/2018    Procedure: Embolization;  Surgeon: [facility] Diagnostic Provider;  Location: [facility] OR [address] [place] [address];  Service: General;  Laterality: N/A;    EYE SURGERY      HERNIA REPAIR      x2    HYSTERECTOMY      LIVER BIOPSY  07/2017       Allergies:  Review of patient's allergies indicates:   Allergen Reactions    Epinephrine     Diltiazem Rash and Other (See Comments)     Skin peels    Morphine Rash       Home Medications:  Prior to Admission medications    Medication Sig Start Date End Date Taking? Authorizing Provider   amLODIPine (NORVASC) 10 MG tablet Take 1 tablet (10 mg total) by mouth once daily. 7/2/18 7/2/19 Yes Nidia Melendez NP    amoxicillin-pot clavulanate 250-62.5 mg/5ml (AUGMENTIN) 250-62.5 mg/5 mL suspension Take 10 mLs (500 mg total) by mouth 2 (two) times daily. 7/5/18  Yes Doyle Dorantes DO, FACP   aspirin (ECOTRIN) 81 MG EC tablet Take 1 tablet (81 mg total) by mouth once daily. 11/9/16  Yes Víctor Sheets MD   ergocalciferol (ERGOCALCIFEROL) 50,000 unit Cap Take 1 capsule (50,000 Units total) by mouth every 7 days. 11/9/16  Yes Víctor Sheets MD   blood sugar diagnostic (BLOOD GLUCOSE TEST) Strp 1 strip by Misc.(Non-Drug; Combo Route) route 3 (three) times daily as needed. 4/26/16   Nidia Melendez NP   blood-glucose meter (FREESTYLE SYSTEM KIT) kit 1 each by Other route 2 (two) times daily. Use as instructed 2/7/18   Nidia Melendez NP   lancets (ONETOUCH ULTRASOFT LANCETS) Misc 1 each by Misc.(Non-Drug; Combo Route) route 2 (two) times daily as needed. 2/7/18   Nidia Melendez NP   nitroGLYCERIN (NITROSTAT) 0.4 MG SL tablet Place 1 tablet (0.4 mg total) under the tongue every 5 (five) minutes as needed for Chest pain. 5/11/17 5/11/18  Víctor Sheets MD   ondansetron (ZOFRAN) 4 MG tablet Take 1 tablet (4 mg total) by mouth every 6 (six) hours as needed for Nausea. 1/11/18   Buck Davison MD   ondansetron (ZOFRAN-ODT) 4 MG TbDL Dissolve 1 tablet (4 mg total) by mouth every 6 (six) hours as needed. 6/29/18   Tadeo Woods MD   oxyCODONE (ROXICODONE) 10 mg Tab immediate release tablet Take 1 tablet (10 mg total) by mouth every 3 (three) hours as needed for Pain. 6/30/18   Ehsan Barrow MD   oxyCODONE (ROXICODONE) 5 MG immediate release tablet Take 1 tablet (5 mg total) by mouth every 6 (six) hours as needed for Pain. 6/29/18   Tadeo Woods MD   promethazine (PHENERGAN) 12.5 MG Supp Place 1 suppository (12.5 mg total) rectally every 6 (six) hours as needed. 11/21/17   Doyle Dorantes DO, KAYLA   ranolazine (RANEXA) 500 MG Tb12 Take 1 tablet (500 mg total) by mouth 2 (two) times daily.  "18  Yang Child NP       Family History:  Family History   Problem Relation Age of Onset    Mental illness Mother     Diabetes Father     Cancer Father         pancreatic    Diabetes Sister     Stomach cancer Sister     Cancer Sister         gastric    Kidney disease Brother     Diabetes Daughter     No Known Problems Daughter     No Known Problems Son     No Known Problems Son     No Known Problems Brother     No Known Problems Brother     No Known Problems Brother     Cancer Maternal Aunt     Cancer Maternal Aunt        Social History:  Social History   Substance Use Topics    Smoking status: Never Smoker    Smokeless tobacco: Never Used    Alcohol use No      Comment: Occasionally       Review of Systems:  Constitutional: positive for fatigue  Respiratory: negative for dyspnea on exertion  Cardiovascular: negative for chest pressure/discomfort  Genitourinary:negative for dysuria  Musculoskeletal:positive for tail bone pain  Neurological: positive for weakness     Health Maintaince :   Primary Care Physician: Nidia Melendez    Immunizations:   TDap     Flu    Pna     Cancer Screening:  PAP:   MMG:   Colonoscopy:      Objective:   Last 24 Hour Vital Signs:  BP  Min: 164/77  Max: 176/70  Temp  Av.4 °F (36.3 °C)  Min: 97.4 °F (36.3 °C)  Max: 97.4 °F (36.3 °C)  Pulse  Av  Min: 77  Max: 83  Resp  Av.5  Min: 19  Max: 20  SpO2  Av.5 %  Min: 99 %  Max: 100 %  Height  Av' 10" (147.3 cm)  Min: 4' 10" (147.3 cm)  Max: 4' 10" (147.3 cm)  Weight  Av.9 kg (132 lb)  Min: 59.9 kg (132 lb)  Max: 59.9 kg (132 lb)  Body mass index is 27.59 kg/m².  No intake/output data recorded.    Physical Examination:  General appearance: alert, cooperative, fatigued and somnolent  Eyes: Pupillary constriction intact, EOM intact  Neurologic: Cranial nerves: III, IV, VI: EOM intact, V: facial light touch sensation normal bilaterally, VII: upper facial muscle function normally " bilaterally, VII: lower facial muscle function normal bilaterally, VII: hearing normal, IX: soft palate elevation normal in midline, XI: trapezius strength normal bilaterally, XLL: tongue strength normal     Laboratory:  Most Recent Data:  CBC: Lab Results   Component Value Date    WBC 25.68 (H) 07/09/2018    HGB 15.1 07/09/2018    HCT 45.6 07/09/2018     07/09/2018    MCV 86 07/09/2018    RDW 14.6 (H) 07/09/2018     WBC Differential:  % N,  % Bands,  % L,  % M,  % Eo,  % Baso,  additional cells seen  BMP: Lab Results   Component Value Date     (H) 07/09/2018    K 5.4 (H) 07/09/2018     (H) 07/09/2018    CO2 21 (L) 07/09/2018     (H) 07/09/2018    CREATININE 3.7 (H) 07/09/2018    GLU 62 (L) 07/09/2018    CALCIUM 9.5 07/09/2018    MG 1.8 07/27/2017    PHOS 3.3 10/26/2017     LFTs: Lab Results   Component Value Date    PROT 6.5 07/09/2018    ALBUMIN 2.9 (L) 07/09/2018    BILITOT 2.8 (H) 07/09/2018    AST 63 (H) 07/09/2018    ALKPHOS 293 (H) 07/09/2018     (H) 07/09/2018     Coags:   Lab Results   Component Value Date    INR 1.0 06/29/2018     FLP: Lab Results   Component Value Date    CHOL 243 (H) 10/26/2017    HDL 58 10/26/2017    LDLCALC 150.8 10/26/2017    TRIG 171 (H) 10/26/2017    CHOLHDL 23.9 10/26/2017     DM: Lab Results   Component Value Date    HGBA1C 5.3 06/29/2018    HGBA1C 5.3 06/13/2018    HGBA1C 5.9 03/15/2018    LDLCALC 150.8 10/26/2017    CREATININE 3.7 (H) 07/09/2018     Thyroid: Lab Results   Component Value Date    TSH 1.270 07/25/2017     Anemia: Lab Results   Component Value Date    IRON 45 08/07/2017    TIBC 246 (L) 08/07/2017    FERRITIN 100 08/07/2017    LNQBUXXK05 729 06/11/2008    FOLATE 10.8 03/11/2008     Cardiac: Lab Results   Component Value Date    TROPONINI 0.072 (H) 07/09/2018    BNP 35 09/19/2017     Urinalysis: Lab Results   Component Value Date    LABURIN No significant growth 09/19/2017    COLORU Yellow 07/09/2018    SPECGRAV 1.025 07/09/2018     NITRITE Negative 07/09/2018    PROTEINUR >=300MG/DL 09/19/2017    KETONESU Negative 07/09/2018    UROBILINOGEN Negative 07/09/2018    BILIRUBINUR NEGATIVE 09/19/2017    WBCUA 4 07/09/2018       Trended Lab Data:    Recent Labs  Lab 07/09/18  1552   WBC 25.68*   HGB 15.1   HCT 45.6      MCV 86   RDW 14.6*   *   K 5.4*   *   CO2 21*   *   CREATININE 3.7*   GLU 62*   PROT 6.5   ALBUMIN 2.9*   BILITOT 2.8*   AST 63*   ALKPHOS 293*   *       Trended Cardiac Data:    Recent Labs  Lab 07/09/18  1552   TROPONINI 0.072*       Microbiology Data:      Other Results:  EKG (my interpretation): .    Radiology:  Imaging Results          CT Abdomen Pelvis  Without Contrast (No Result on File)                CT Head Without Contrast (Final result)     Abnormal  Result time 07/09/18 17:38:58    Final result by Jose Enrique Richard MD (07/09/18 17:38:58)                 Impression:      Left basal ganglia (corpus stratum) suspected acute/subacute infarct.    No intracranial hemorrhage or acute large vascular territory cortical infarct.    This report was flagged in Epic as abnormal.      Electronically signed by: Jose Enrique Richard MD  Date:    07/09/2018  Time:    17:38             Narrative:    EXAMINATION:  CT HEAD WITHOUT CONTRAST    CLINICAL HISTORY:  Confusion/delirium, altered LOC, unexplained;    TECHNIQUE:  Low dose axial CT images obtained throughout the head without intravenous contrast. Sagittal and coronal reconstructions were performed.    COMPARISON:  None.    FINDINGS:  Intracranial compartment:    Mild generalized cerebral volume loss.  The ventricles are midline without distortion by mass effect or evidence of acute hydrocephalus.  No extra-axial blood or fluid collections.    There is an area of hypoattenuation without volume loss within the anterior limb of the left internal capsule which also extends to the left caudate and left subinsular region and anterior aspect of the left lenticular  nucleus concerning for acute/subacute infarct.  No parenchymal mass, hemorrhage, edema or major vascular distribution infarct.    Skull/extracranial contents (limited evaluation): No fracture. Mastoid air cells and paranasal sinuses are essentially clear.  Visualized portions of the orbits are within normal limits.                               X-Ray Chest AP Portable (Final result)  Result time 07/09/18 14:56:06    Final result by Darwin Ashford MD (07/09/18 14:56:06)                 Impression:      1. No acute intrathoracic processes.      Electronically signed by: Darwin Ashford MD  Date:    07/09/2018  Time:    14:56             Narrative:    EXAMINATION:  XR CHEST AP PORTABLE    CLINICAL HISTORY:  Epigastric pain    TECHNIQUE:  Single frontal view of the chest was performed.    COMPARISON:  Chest 09/19/2017    FINDINGS:  There is a poor inspiratory effort when compared to the previous study.  No acute lobar consolidations or pneumothorax or pulmonary vascular congestion or pleural effusions.  Heart size is within normal limits.  There is faint atherosclerotic changes of the aortic arch.  There are spondylotic changes in the dorsal spine.                                     Assessment:     Lala Ames is a 67 y.o. female with:  Patient Active Problem List    Diagnosis Date Noted    Neuroendocrine tumor 06/29/2018    Dysphagia 12/14/2017    Gastritis 12/14/2017    Lung metastases 12/05/2017    Neuroendocrine carcinoma, 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    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    Nausea 11/21/2017    History of iron deficiency anemia 10/31/2017    Neuroendocrine cancer 08/07/2017    Metastatic cancer to liver 07/28/2017    Nausea 07/26/2017    Hyperkalemia 07/25/2017    Acute pyelonephritis 07/25/2017    Liver mass 07/17/2017    Nephrolithiasis 07/17/2017    Hypertension 07/09/2017    DISH (diffuse idiopathic skeletal hyperostosis) 06/12/2017    Diastolic  dysfunction 11/09/2016    Low vitamin D level 11/09/2016    Nephrotic syndrome 11/09/2016    Proliferative diabetic retinopathy associated with type 2 diabetes mellitus 04/01/2016    Uncontrolled diabetes mellitus 03/01/2016    Diabetic macular edema 01/15/2016    Colon cancer screening 05/25/2015    Colon polyp 05/25/2015    Noncompliance with diabetes treatment 11/26/2014    DM (diabetes mellitus), type 2, uncontrolled 11/05/2014    Nuclear sclerosis of both eyes 10/17/2014    History of non-ST elevation myocardial infarction (NSTEMI) 08/29/2014    Stable angina 08/29/2014    Cardiomegaly 08/29/2014    HTN (hypertension) 08/29/2014    MVP (mitral valve prolapse) 08/29/2014    Obesity, Class II, BMI 35-39.9 08/29/2014    CKD (chronic kidney disease) stage 4, GFR 15-29 ml/min 08/29/2014    Vaginal yeast infection 07/25/2014    Non compliance w medication regimen 07/25/2014    H/O hysterectomy for benign disease 07/25/2014    Postmenopausal vaginal bleeding 07/25/2014    Mixed urge and stress incontinence 07/25/2014    Hyperlipidemia 08/20/2013    CAD (coronary artery disease) 08/19/2013        Plan:     Consult Neurology and get MRI of head  Watch her lab values      Code Status:         Graham Alexander (L3)    [facility] Hospitalist Pager numbers:   [facility] Hospitalist Medicine Team A (Dhara/Sudhakar): [phone]  [facility] Hospitalist Medicine Team B (Lee/Jayson):  [phone]

## 2018-07-10 NOTE — PLAN OF CARE
Problem: SLP Goal  Goal: SLP Goal  Short Term Goals:  1. Pt will participate in ongoing swallow assessment to determine least restrictive diet.   2. Pt will tolerate clear liquid diet with no audible s/s of dysphagia and good oral clearance.   3. Pt will consume 50%-75% of clear liquid diet without overt s/s of aspiration given min assist.   4. Pt will tolerate advanced trials of solids and pureed with no audible s/s of dysphagia.   5. Pt state orientation info x4 with min cues.   6. Pt will follow complex commands with 90% acc with min A.   7. Pt will complete basic OMEx with min A to increase oral motor coordination/strength.          Outcome: Ongoing (interventions implemented as appropriate)  7/10: Swallow eval and speech/lang eval completed, pt presents with oral dysphagia at bedside with having to expectorate light and clear phlegm. Pt with timely oral and pharyngeal swallow but reports feeling N/V symptoms at bedside. REC: clear liquid diet for now, PO meds with water or jello as tolerated. Ongoing speech eval at this time for DC recs.   VERONICA Collado, CCC-SLP  Speech Pathologist 7/10/2018

## 2018-07-10 NOTE — CONSULTS
Radiology Consult    Lala Ames is a 67 y.o. 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  Past Medical History:   Diagnosis Date    Anticoagulant long-term use     Arthritis     Blood transfusion     Coronary artery disease     Diabetes mellitus     Diabetes mellitus, type 2     Diabetic retinopathy     Disorder of kidney and ureter     H/O coronary angiogram     Hypertension     Liver cancer     Lung metastases 12/5/2017    MI (myocardial infarction)     x3    Nausea 11/21/2017    Neuroendocrine carcinoma, 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    PDR (proliferative diabetic retinopathy)     Secondary malignant carcinoid tumor of liver     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     Past Surgical History:   Procedure Laterality Date    ABDOMINAL SURGERY      bowel had to be repaired after hysterectomy    CARDIAC CATHETERIZATION      COLECTOMY      r/t bowel injury from hysterectomy    COLONOSCOPY      CYST REMOVAL      soft tissue on forehead    EMBOLIZATION N/A 6/29/2018    Procedure: Embolization;  Surgeon: Dheeraj Diagnostic Provider;  Location: [facility] OR [address] [place] [address];  Service: General;  Laterality: N/A;    EYE SURGERY      HERNIA REPAIR      x2    HYSTERECTOMY      LIVER BIOPSY  07/2017           Scheduled Meds:    amLODIPine  10 mg Oral Daily    aspirin  81 mg Oral Daily    heparin (porcine)  5,000 Units Subcutaneous Q8H    piperacillin-tazobactam (ZOSYN) IVPB  4.5 g Intravenous Q12H     Continuous Infusions:    lactated ringers 100 mL/hr at 07/10/18 1151    octreotide infusion (50 mcg/mL) 250 mcg/hr (07/10/18 1348)     PRN Meds:dextrose 50%, dextrose 50%, glucagon (human recombinant), glucose, glucose, hydrALAZINE, insulin aspart U-100, ondansetron, sodium chloride 0.9%    Allergies:   Review of patient's allergies indicates:   Allergen Reactions    Epinephrine     Diltiazem Rash and Other (See Comments)     Skin peels    Morphine Rash        Labs:  No results for input(s): INR in the last 168 hours.    Invalid input(s):  PT,  PTT    Recent Labs  Lab 07/10/18  0406   WBC 14.60*   HGB 10.4*   HCT 32.2*   MCV 86         Recent Labs  Lab 07/10/18  0406 07/10/18  1056   * 217*   * 150*   K 4.8 4.4   * 119*   CO2 20* 22*   * 96*   CREATININE 2.9* 2.9*   CALCIUM 8.6* 9.3   *  --    AST 40  --    ALBUMIN 2.0*  --    BILITOT 2.0*  --          Vitals (Most Recent):  Temp: 97.5 °F (36.4 °C) (07/10/18 1515)  Pulse: 79 (07/10/18 1545)  Resp: 18 (07/10/18 1545)  BP: (!) 156/70 (07/10/18 1530)  SpO2: 100 % (07/10/18 1545)       ASSESSMENT:    68 yo F with metastatic NET s/p TACE 6/29/18 presented with fatigue, lethargy and admitted to ICU with BREONNA, elevated LFTs and late acute vs subacute left caudate infarct. Patient prerenal and suspect this is in part due to dehydration. Creatinine and LFTs have improved some with IVFs. Difficult to assess CT without contrast, but findings may be related to post treatment change. Will follow up US and continue to monitor.      Berto Dukes MD  Department of Radiology  Pager: [phone]

## 2018-07-10 NOTE — PROGRESS NOTES
[employer] renal fellow HO V    Progress Note    Reason for Consult:     BREONNA    Subjective:      History of Present Illness:  Lala Ames is a 67 y.o. 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 The patient presented to the [facility] on 7/9/2018 with a primary complaint of Fatigue (pt had a femoral artery angiogram procedure on 6/29. Has been lethargic since then. Called Dr. Dorantes and was told to come to the ER. Family member reports pt was hyperglycemic last night, but that it improved today)    Pt presents to ED per family 2/2 1.5weeks of AMS  Pt has a PMHx sig for metastatic neuroendocrine tumor to liver and lungs; in 5/2018, heme/Onc notes documented that there were limited treatment options left for pt to explore - her case was reviewed at multi-disciplinary tumor conference and it was decided upon to attempt TACE for non-resectable liver mets.    Pt underwent TACE procedure on 6/29/18 and was discharged 7/1/18; per daughter, she was discharged with BP in 200's systolic; since being discharged, the pt was more somnolent and sleeping more which daughter initially thought was 2/2 pain meds; she had decreased po intake; her daughter states that she was confused from the second day post hospital discharge until admit here; pt also had elevated blood sugars approx 1-2 days post hospital discharge; she had intermittent fevers; there was no Hx of loose stool/dysuria/productive cough    Day  "PTA, the pt had elevated BGs and her daughter decided to give some insulin which the pt had recently been taken off 2/2 normal blood glucoses; daughter states the pt's sugars this AM after giving insulin was 170's and daughter felt pt was slightly more coherent.    Renal Hx  -per , th ept appears to have some baseline CKD since 2017; she does not have an outpt nephrologist    Interim History:  Pt states that she is feeling better this AM. Pt does not wish to be on dialysis ever even if it were medically indicated. Will respect the pt's wishes in this regard. Pt reports no N/V just some "dry belching." Pt would like to be allowed to drink.     Review of Systems:  All other systems are reviewed and are negative.       Objective:   Last 24 Hour Vital Signs:  BP  Min: 142/65  Max: 234/102  Temp  Av.8 °F (36.6 °C)  Min: 97.4 °F (36.3 °C)  Max: 98.2 °F (36.8 °C)  Pulse  Av.3  Min: 55  Max: 89  Resp  Avg: 15.4  Min: 10  Max: 28  SpO2  Av.4 %  Min: 97 %  Max: 100 %  Height  Av' 10" (147.3 cm)  Min: 4' 10" (147.3 cm)  Max: 4' 10" (147.3 cm)  Weight  Av.2 kg (128 lb 6.2 oz)  Min: 56.6 kg (124 lb 12.5 oz)  Max: 59.9 kg (132 lb)  I/O last 3 completed shifts:  In: 1913.3 [I.V.:1713.3; IV Piggyback:200]  Out: 1430 [Urine:1430]    Physical Examination:  GEN - NAD, oriented x4; somnolent  HEAD - NCAT   ORAL - dry mucus membranes  EYES - PERRLA, non icteric   NECK - neck veins flat; no thyromegaly   CHEST - lungs CTA bilaterally; equal expansion   HEART - RRR, no m/r/s3/s4   ABD - slight distention with decreased BS, non ttp; no guarding or rigidity   EXTR  -warm; no edema  Skin - no bruising; no ecchymosis   Neuro - no asterixis or localizing deficits although pt not completely cooperative with exam 2/2 lethargy      Laboratory Results:    Trended Lab Data:    Recent Labs  Lab 18  1552 18  2107 07/10/18  0006 07/10/18  0406   WBC 25.68*  --   --  14.60*   HGB 15.1  --   --  10.4*   HCT 45.6  " --   --  32.2*     --   --  186   MCV 86  --   --  86   RDW 14.6*  --   --  14.5   * 154* 152* 151*   K 5.4* 5.1 5.8* 4.8   * 124* 120* 123*   CO2 21* 18* 24 20*   * 108* 116* 108*   GLU 62* 45* 176* 243*   PROT 6.5  --   --  5.0*   ALBUMIN 2.9*  --   --  2.0*   BILITOT 2.8*  --   --  2.0*   AST 63*  --   --  40   ALKPHOS 293*  --   --  183*   *  --   --  144*       Trended Cardiac Data:    Recent Labs  Lab 07/09/18  1552   TROPONINI 0.072*           Other Results:  EKG (my interpretation): NSR; LAD; TWI I/aVL - no changes from 6/29/18    Radiology:  Ct Head Without Contrast    Result Date: 7/9/2018  EXAMINATION: CT HEAD WITHOUT CONTRAST CLINICAL HISTORY: Confusion/delirium, altered LOC, unexplained; TECHNIQUE: Low dose axial CT images obtained throughout the head without intravenous contrast. Sagittal and coronal reconstructions were performed. COMPARISON: None. FINDINGS: Intracranial compartment: Mild generalized cerebral volume loss.  The ventricles are midline without distortion by mass effect or evidence of acute hydrocephalus.  No extra-axial blood or fluid collections. There is an area of hypoattenuation without volume loss within the anterior limb of the left internal capsule which also extends to the left caudate and left subinsular region and anterior aspect of the left lenticular nucleus concerning for acute/subacute infarct.  No parenchymal mass, hemorrhage, edema or major vascular distribution infarct. Skull/extracranial contents (limited evaluation): No fracture. Mastoid air cells and paranasal sinuses are essentially clear.  Visualized portions of the orbits are within normal limits.     Left basal ganglia (corpus stratum) suspected acute/subacute infarct. No intracranial hemorrhage or acute large vascular territory cortical infarct. This report was flagged in Epic as abnormal. Electronically signed by: Jose Enrique Richard MD Date:    07/09/2018 Time:    17:38    Ir  Embolization For Tumor_organ Ischemia    Result Date: 6/29/2018  EXAMINATION: Hepatic transarterial chemoembolization - Conventional Procedural Personnel Attending physician(s): Florencio Madden MD Fellow physician(s): None Resident physician(s): None Advanced practice provider(s): None Pre-procedure diagnosis: Other secondary neuroendocrine tumors Post-procedure diagnosis: Same Indication: Locoregional therapy for tumor control Additional clinical history: None Complications: No immediate complications. PROCEDURAL SUMMARY: Arterial access with ultrasound guidance Aortography: None Visceral angiography: Celiac angiography Selective hepatic angiography: Not performed Superselective hepatic angiography: Performed as described below Embolization and post-embolization angiography as described below Additional procedures: None PROCEDURE: Pre-procedure: Consent: Informed consent for the procedure was obtained and time-out was performed prior to the procedure. Preparation: The site was prepared and draped using maximal sterile barrier technique including cutaneous antisepsis. Antibiotic administered: Prophylactic dose within 1 hour of procedure start time or 2 hours for vancomycin or fluoroquinolones. Anesthesia/sedation: Level of anesthesia/sedation: Moderate sedation (conscious sedation) Anesthesia/sedation administered by: Independent trained observer under attending supervision with continuous monitoring of the patient's level of consciousness and physiologic status Total intra-service sedation time (minutes): 60 Access: Local anesthesia was administered. The vessel was sonographically evaluated and judged to be patent. Real time ultrasound was used to visualize needle entry into the vessel and a permanent image was stored. A 5 Macedonian sheath was placed. Vessel accessed: Right common femoral artery Access technique: Micropuncture set with 21 gauge needle Angiography: The hepatic arterial system was catheterized using  Motarjame and progreat catheters. Variant anatomy: The origin of the left gastric artery cannot be visualized from the celiac axis.  A prominent inferior pancreaticoduodenal arteries identified originating from the celiac axis. Vessel catheterized: Celiac artery DSA images demonstrate nonvisualization of the left gastric artery origin and a prominent inferior pancreaticoduodenal artery.  No stenosis.  Delayed portal venogram demonstrates patent portal vein with hepatopetal flow. Vessel catheterized: Right hepatic artery DSA images demonstrate no hypervascular tumor blush.  However, when correlating with prior MRI, innumerable lesions are seen replace in the majority the right liver lobe.  This was selected was adequate site for transarterial chemoembolization. Drug-eluting bead chemoembolization: Catheter position for embolization #1: Right hepatic artery Embolic administered from this position: 1 vial of 100-300 micron LC beads loaded with 50 mg of doxorubicin Additional embolic administered from this position: None Angiographic endpoint: Slowed flow (contrast visible for fewer than 5 heartbeats) Completion angiography: Catheter position: Right hepatic artery DSA images demonstrate sluggish flow within embolized vessels Closure: Access site angiography performed: Yes Patent vessel with appropriate access level Arterial closure technique: Exoseal Hemostasis achieved from closure technique: Partial Duration of manual compression (minutes): 10 Contrast: Contrast agent: Omnipaque 350 Contrast volume (mL): 55 Radiation Dose: Fluoroscopy time ( minutes): 7.8 Reference air kerma ( mGy ): 98 Kerma area product ( cGy-m2 ): 3031 Additional Details: Additional description of procedure: None Equipment details: None Specimens removed: None Estimated blood loss (mL): Less than 10 Standardized report: [id number]     Hepatic angiography demonstrates no definite evidence for hypervascular tumors.  Transarterial  chemoembolization of the right hepatic artery as described above. Plan: Patient will return in approximately 1 month with repeat MRI to assess response to therapy and need for additional treatment. Attestation: Signer name: Florencio Madden MD I attest that I was present for the entire procedure. I reviewed the stored images and agree with the report as written. Electronically signed by: Florencio Madden MD Date:    06/29/2018 Time:    13:18    X-ray Chest Ap Portable    Result Date: 7/9/2018  EXAMINATION: XR CHEST AP PORTABLE CLINICAL HISTORY: Epigastric pain TECHNIQUE: Single frontal view of the chest was performed. COMPARISON: Chest 09/19/2017 FINDINGS: There is a poor inspiratory effort when compared to the previous study.  No acute lobar consolidations or pneumothorax or pulmonary vascular congestion or pleural effusions.  Heart size is within normal limits.  There is faint atherosclerotic changes of the aortic arch.  There are spondylotic changes in the dorsal spine.     1. No acute intrathoracic processes. Electronically signed by: Darwin Ashford MD Date:    07/09/2018 Time:    14:56    Ct Abdomen Pelvis  Without Contrast    Result Date: 7/9/2018  EXAMINATION: CT ABDOMEN PELVIS WITHOUT CONTRAST CLINICAL HISTORY: abdominal pain; TECHNIQUE: Low dose axial images, sagittal and coronal reformations were obtained from the lung bases to the pubic symphysis.  Oral contrast was not administered. COMPARISON: Multiple prior CT and abdominal MRI, last CT dated 04/30/2018. FINDINGS: The visualized portion of the heart is unremarkable.  There is minimal right basilar atelectasis. Liver is enlarged measuring 24 cm with innumerable hypodense hepatic lesions seen consistent with known history of metastatic disease in this patient with history of neuroendocrine tumor.  There is no intra-or extrahepatic biliary ductal dilatation.  High density material, presumable sludge is seen within the gallbladder.  The stomach, pancreas, spleen,  and right adrenal gland are unremarkable.  There is stable left adrenal thickening. Single punctate nonobstructing stone is seen within the right kidney.  No left renal stones.  No evidence of hydronephrosis.  Ureters are difficult to track, however no definite stones are seen along their expected courses.  Urinary bladder is unremarkable.  Uterus has been removed. Postsurgical changes are seen consistent with partial bowel resection.  The visualized loops of small and large bowel show no evidence of obstruction or inflammation.  Appendix is visualized and is unremarkable.  No free air or free fluid. Aorta tapers normally. No acute osseous abnormality identified. Subcutaneous soft tissue structures are unremarkable.     1. Hepatomegaly with known diffuse hepatic metastatic disease. 2. Otherwise no acute intra-abdominal abnormalities identified. 3. High density material within the gallbladder, presumed biliary sludge. 4. Punctate nonobstructing right renal stone. 5. Postsurgical changes and additional findings as detailed above. Electronically signed by: Renetta Clarke MD Date:    07/09/2018 Time:    20:47         Assessment/Plan       This is a 66 yo AA female who was admitted for AMS; L-renal consulted for    BREONNA stage I  -etiol include pre-renal ATN from contrast/decreased po intake; ATN from infection; urine Na <20; Ct pelvis without reversible causes of BREONNA  -pt can still benefit from volume resuscitation  -no acute indication for RRT; had conversation with pt and daughter - pt expresses desire to forego RRT should she need it - she understands the risks and benefits and the daughter agrees that the pt's mental status is near baseline and that pt wishes should be respected   -please renally dose all meds  -avoid hypotonic IV fluids in setting of acute stroke to reduce risk of cerebral edema. Ordered urine and serum osm. Urine specific gravity of 1.025 with suggests that pt is appropriately concentrating urine.  Recommend placing pt on plasmalyte at 100cc/hr and monitoring serum electrolytes Q6h.  -clear liquid diet and advance as tolerated.    AGMA  -etiol likely combo lactic acidosis + renal failure   -resolved    Acute/subacute stroke  -MRI pending    Sepsis  -agree with broad spectrum Abx for now; rec random vanc levels before empiric dosing  -repeat lactic acid; BCx/UCx pending    Acute liver injury  -etiol likely 2/2 recent TACE procedure    Hypernatremia  -free water deficit approx 2L  -As above    Metastatic neuroendocrine tumor  -poor prognosis as pt is limited in therapy per heme/onc notes    Tadeo Blas MD, 7/10/2018 9:19 AM  [employer] Nephrology PGY-V  Pager: [phone]  Cell: [phone]

## 2018-07-10 NOTE — TELEPHONE ENCOUNTER
Spoke to patient's daughter, let her know Dr. Dorantes was aware of her mother's status.  She asked questions about stroke that I referred her back to medical team for.  Answered all other questions.

## 2018-07-10 NOTE — PLAN OF CARE
Notified , pt received labetalol 10 mg IV one time dose. SBP decreased to 180s. /84 at 0000.    Requesting for prn BP med.  ordered hydralazine Q8H prn.

## 2018-07-10 NOTE — CONSULTS
[employer] renal fellow CORAL SUTTON    consult note    Reason for Consult:     BREONNA    Subjective:      History of Present Illness:  Lala Ames is a 67 y.o. 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 The patient presented to the [facility] on 7/9/2018 with a primary complaint of Fatigue (pt had a femoral artery angiogram procedure on 6/29. Has been lethargic since then. Called Dr. Dorantes and was told to come to the ER. Family member reports pt was hyperglycemic last night, but that it improved today)    Pt presents to ED per family 2/2 1.5weeks of AMS  Pt has a PMHx sig for metastatic neuroendocrine tumor to liver and lungs; in 5/2018, heme/Onc notes documented that there were limited treatment options left for pt to explore - her case was reviewed at multi-disciplinary tumor conference and it was decided upon to attempt TACE for non-resectable liver mets.    Pt underwent TACE procedure on 6/29/18 and was discharged 7/1/18; per daughter, she was discharged with BP in 200's systolic; since being discharged, the pt was more somnolent and sleeping more which daughter initially thought was 2/2 pain meds; she had decreased po intake; her daughter states that she was confused from the second day post hospital discharge until admit here; pt also had elevated blood sugars approx 1-2 days post hospital discharge; she had intermittent fevers; there was no Hx of loose stool/dysuria/productive cough    Day  PTA, the pt had elevated BGs and her daughter decided to give some insulin which the pt had recently been taken off 2/2 normal blood glucoses; daughter states the pt's sugars this AM after giving insulin was 170's and daughter felt pt was slightly more coherent.    Renal Hx  -per EMR, th ept appears to have some baseline CKD since 6/2017; she does not have an outpt nephrologist    ROS  -pt denies abd pain/f/c/sob at present    Past Medical History:  Past Medical History:   Diagnosis Date    Anticoagulant long-term use     Arthritis     Blood transfusion     Coronary artery disease     Diabetes mellitus     Diabetes mellitus, type 2     Diabetic retinopathy     Disorder of kidney and ureter     H/O coronary angiogram     Hypertension     Liver cancer     Lung metastases 12/5/2017    MI (myocardial infarction)     x3    Nausea 11/21/2017    Neuroendocrine carcinoma, 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    PDR (proliferative diabetic retinopathy)     Secondary malignant carcinoid tumor of liver     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       Past Surgical History:  Past Surgical History:   Procedure Laterality Date    ABDOMINAL SURGERY      bowel had to be repaired after hysterectomy    CARDIAC CATHETERIZATION      COLECTOMY      r/t bowel injury from hysterectomy    COLONOSCOPY      CYST REMOVAL      soft tissue on forehead    EMBOLIZATION N/A 6/29/2018    Procedure: Embolization;  [facility];  Location: [facility] OR [address] [place] [address];  Service: General;  Laterality: N/A;    EYE SURGERY      HERNIA REPAIR      x2    HYSTERECTOMY      LIVER BIOPSY  07/2017       Allergies:  Review of patient's allergies indicates:   Allergen Reactions    Epinephrine     Diltiazem Rash and Other (See Comments)     Skin peels    Morphine Rash       Medications:   In-Hospital Scheduled Medications:   heparin (porcine)  5,000 Units Subcutaneous Q8H    [START ON 7/10/2018]  piperacillin-tazobactam (ZOSYN) IVPB  4.5 g Intravenous Q12H      In-Hospital PRN Medications:  dextrose 50%, dextrose 50%, glucagon (human recombinant), glucose, glucose, insulin aspart U-100, sodium chloride 0.9%   In-Hospital IV Infusion Medications:   dextrose 5 % and 0.45 % NaCl        Home Medications:  Prior to Admission medications    Medication Sig Start Date End Date Taking? Authorizing Provider   amLODIPine (NORVASC) 10 MG tablet Take 1 tablet (10 mg total) by mouth once daily. 7/2/18 7/2/19 Yes Nidia Melendez NP   amoxicillin-pot clavulanate 250-62.5 mg/5ml (AUGMENTIN) 250-62.5 mg/5 mL suspension Take 10 mLs (500 mg total) by mouth 2 (two) times daily. 7/5/18  Yes Doyle Dorantes DO, FACP   aspirin (ECOTRIN) 81 MG EC tablet Take 1 tablet (81 mg total) by mouth once daily. 11/9/16  Yes Víctor Sheets MD   ergocalciferol (ERGOCALCIFEROL) 50,000 unit Cap Take 1 capsule (50,000 Units total) by mouth every 7 days. 11/9/16  Yes Víctor Sheets MD   blood sugar diagnostic (BLOOD GLUCOSE TEST) Strp 1 strip by Misc.(Non-Drug; Combo Route) route 3 (three) times daily as needed. 4/26/16   Nidia Melendez NP   blood-glucose meter (FREESTYLE SYSTEM KIT) kit 1 each by Other route 2 (two) times daily. Use as instructed 2/7/18   Nidia Melendez NP   lancets (ONETOUCH ULTRASOFT LANCETS) Misc 1 each by Misc.(Non-Drug; Combo Route) route 2 (two) times daily as needed. 2/7/18   Nidia Melendez NP   nitroGLYCERIN (NITROSTAT) 0.4 MG SL tablet Place 1 tablet (0.4 mg total) under the tongue every 5 (five) minutes as needed for Chest pain. 5/11/17 5/11/18  Víctor Sheets MD   ondansetron (ZOFRAN) 4 MG tablet Take 1 tablet (4 mg total) by mouth every 6 (six) hours as needed for Nausea. 1/11/18   Buck Davison MD   ondansetron (ZOFRAN-ODT) 4 MG TbDL Dissolve 1 tablet (4 mg total) by mouth every 6 (six) hours as needed. 6/29/18   Tadeo Woods MD   oxyCODONE (ROXICODONE) 10 mg Tab immediate release  tablet Take 1 tablet (10 mg total) by mouth every 3 (three) hours as needed for Pain. 18   Ehsan Barrow MD   oxyCODONE (ROXICODONE) 5 MG immediate release tablet Take 1 tablet (5 mg total) by mouth every 6 (six) hours as needed for Pain. 18   Tadeo Woods MD   promethazine (PHENERGAN) 12.5 MG Supp Place 1 suppository (12.5 mg total) rectally every 6 (six) hours as needed. 17   Doyle Dorantes DO, FACP   ranolazine (RANEXA) 500 MG Tb12 Take 1 tablet (500 mg total) by mouth 2 (two) times daily. 18  Yang Child NP       Family History:  Family History   Problem Relation Age of Onset    Mental illness Mother     Diabetes Father     Cancer Father         pancreatic    Diabetes Sister     Stomach cancer Sister     Cancer Sister         gastric    Kidney disease Brother     Diabetes Daughter     No Known Problems Daughter     No Known Problems Son     No Known Problems Son     No Known Problems Brother     No Known Problems Brother     No Known Problems Brother     Cancer Maternal Aunt     Cancer Maternal Aunt        Social History:  Social History   Substance Use Topics    Smoking status: Never Smoker    Smokeless tobacco: Never Used    Alcohol use No      Comment: Occasionally       Review of Systems:  All other systems are reviewed and are negative.    Health Maintenance:     Immunizations:   Currently on File:   Most Recent Immunizations   Administered Date(s) Administered    Influenza - Quadrivalent - PF 2016    Influenza - Trivalent - PF (PED) 2014    Pneumococcal Conjugate - 13 Valent 2016    Pneumococcal Polysaccharide - 23 Valent 2017    Tdap 2017        Objective:   Last 24 Hour Vital Signs:  BP  Min: 164/77  Max: 221/95  Temp  Av.9 °F (36.6 °C)  Min: 97.4 °F (36.3 °C)  Max: 98.2 °F (36.8 °C)  Pulse  Av.4  Min: 72  Max: 83  Resp  Av.4  Min: 10  Max: 20  SpO2  Av.1 %  Min: 98 %  Max:  "100 %  Height  Av' 10" (147.3 cm)  Min: 4' 10" (147.3 cm)  Max: 4' 10" (147.3 cm)  Weight  Av.9 kg (132 lb)  Min: 59.9 kg (132 lb)  Max: 59.9 kg (132 lb)  No intake/output data recorded.    Physical Examination:  GEN - NAD, oriented x4; somnolent  HEAD - NCAT   ORAL - dry mucus membranes  EYES - PERRLA, non icteric   NECK - neck veins flat; no thyromegaly   CHEST - lungs CTA bilaterally; equal expansion   HEART - RRR, no m/r/s3/s4   ABD - slight distention with decreased BS, non ttp; no guarding or rigidity   EXTR  -warm; no edema  Skin - no bruising; no ecchymosis   Neuro - no asterixis or localizing deficits although pt not completely cooperative with exam 2/2 lethargy      Laboratory Results:    Trended Lab Data:    Recent Labs  Lab 18  1552 18  2107   WBC 25.68*  --    HGB 15.1  --    HCT 45.6  --      --    MCV 86  --    RDW 14.6*  --    * 154*   K 5.4* 5.1   * 124*   CO2 21* 18*   * 108*   GLU 62* 45*   PROT 6.5  --    ALBUMIN 2.9*  --    BILITOT 2.8*  --    AST 63*  --    ALKPHOS 293*  --    *  --        Trended Cardiac Data:    Recent Labs  Lab 18  1552   TROPONINI 0.072*           Other Results:  EKG (my interpretation): NSR; LAD; TWI I/aVL - no changes from 18    Radiology:  Ct Head Without Contrast    Result Date: 2018  EXAMINATION: CT HEAD WITHOUT CONTRAST CLINICAL HISTORY: Confusion/delirium, altered LOC, unexplained; TECHNIQUE: Low dose axial CT images obtained throughout the head without intravenous contrast. Sagittal and coronal reconstructions were performed. COMPARISON: None. FINDINGS: Intracranial compartment: Mild generalized cerebral volume loss.  The ventricles are midline without distortion by mass effect or evidence of acute hydrocephalus.  No extra-axial blood or fluid collections. There is an area of hypoattenuation without volume loss within the anterior limb of the left internal capsule which also extends to the left " caudate and left subinsular region and anterior aspect of the left lenticular nucleus concerning for acute/subacute infarct.  No parenchymal mass, hemorrhage, edema or major vascular distribution infarct. Skull/extracranial contents (limited evaluation): No fracture. Mastoid air cells and paranasal sinuses are essentially clear.  Visualized portions of the orbits are within normal limits.     Left basal ganglia (corpus stratum) suspected acute/subacute infarct. No intracranial hemorrhage or acute large vascular territory cortical infarct. This report was flagged in Epic as abnormal. Electronically signed by: Jose Enrique Richard MD Date:    07/09/2018 Time:    17:38    Ir Embolization For Tumor_organ Ischemia    Result Date: 6/29/2018  EXAMINATION: Hepatic transarterial chemoembolization - Conventional Procedural Personnel Attending physician(s): Florencio Madden MD Fellow physician(s): None Resident physician(s): None Advanced practice provider(s): None Pre-procedure diagnosis: Other secondary neuroendocrine tumors Post-procedure diagnosis: Same Indication: Locoregional therapy for tumor control Additional clinical history: None Complications: No immediate complications. PROCEDURAL SUMMARY: Arterial access with ultrasound guidance Aortography: None Visceral angiography: Celiac angiography Selective hepatic angiography: Not performed Superselective hepatic angiography: Performed as described below Embolization and post-embolization angiography as described below Additional procedures: None PROCEDURE: Pre-procedure: Consent: Informed consent for the procedure was obtained and time-out was performed prior to the procedure. Preparation: The site was prepared and draped using maximal sterile barrier technique including cutaneous antisepsis. Antibiotic administered: Prophylactic dose within 1 hour of procedure start time or 2 hours for vancomycin or fluoroquinolones. Anesthesia/sedation: Level of anesthesia/sedation: Moderate  sedation (conscious sedation) Anesthesia/sedation administered by: Independent trained observer under attending supervision with continuous monitoring of the patient's level of consciousness and physiologic status Total intra-service sedation time (minutes): 60 Access: Local anesthesia was administered. The vessel was sonographically evaluated and judged to be patent. Real time ultrasound was used to visualize needle entry into the vessel and a permanent image was stored. A 5 Luxembourg[place] sheath was placed. Vessel accessed: Right common femoral artery Access technique: Micropuncture set with 21 gauge needle Angiography: The hepatic arterial system was catheterized using Motarjame and progreat catheters. Variant anatomy: The origin of the left gastric artery cannot be visualized from the celiac axis.  A prominent inferior pancreaticoduodenal arteries identified originating from the celiac axis. Vessel catheterized: Celiac artery DSA images demonstrate nonvisualization of the left gastric artery origin and a prominent inferior pancreaticoduodenal artery.  No stenosis.  Delayed portal venogram demonstrates patent portal vein with hepatopetal flow. Vessel catheterized: Right hepatic artery DSA images demonstrate no hypervascular tumor blush.  However, when correlating with prior MRI, innumerable lesions are seen replace in the majority the right liver lobe.  This was selected was adequate site for transarterial chemoembolization. Drug-eluting bead chemoembolization: Catheter position for embolization #1: Right hepatic artery Embolic administered from this position: 1 vial of 100-300 micron LC beads loaded with 50 mg of doxorubicin Additional embolic administered from this position: None Angiographic endpoint: Slowed flow (contrast visible for fewer than 5 heartbeats) Completion angiography: Catheter position: Right hepatic artery DSA images demonstrate sluggish flow within embolized vessels Closure: Access site angiography  performed: Yes Patent vessel with appropriate access level Arterial closure technique: Exoseal Hemostasis achieved from closure technique: Partial Duration of manual compression (minutes): 10 Contrast: Contrast agent: Omnipaque 350 Contrast volume (mL): 55 Radiation Dose: Fluoroscopy time ( minutes): 7.8 Reference air kerma ( mGy ): 98 Kerma area product ( cGy-m2 ): 3031 Additional Details: Additional description of procedure: None Equipment details: None Specimens removed: None Estimated blood loss (mL): Less than 10 Standardized report: [id number]     Hepatic angiography demonstrates no definite evidence for hypervascular tumors.  Transarterial chemoembolization of the right hepatic artery as described above. Plan: Patient will return in approximately 1 month with repeat MRI to assess response to therapy and need for additional treatment. Attestation: Signer name: Florencio Madden MD I attest that I was present for the entire procedure. I reviewed the stored images and agree with the report as written. Electronically signed by: Florencio Madden MD Date:    06/29/2018 Time:    13:18    X-ray Chest Ap Portable    Result Date: 7/9/2018  EXAMINATION: XR CHEST AP PORTABLE CLINICAL HISTORY: Epigastric pain TECHNIQUE: Single frontal view of the chest was performed. COMPARISON: Chest 09/19/2017 FINDINGS: There is a poor inspiratory effort when compared to the previous study.  No acute lobar consolidations or pneumothorax or pulmonary vascular congestion or pleural effusions.  Heart size is within normal limits.  There is faint atherosclerotic changes of the aortic arch.  There are spondylotic changes in the dorsal spine.     1. No acute intrathoracic processes. Electronically signed by: Darwin Ashford MD Date:    07/09/2018 Time:    14:56    Ct Abdomen Pelvis  Without Contrast    Result Date: 7/9/2018  EXAMINATION: CT ABDOMEN PELVIS WITHOUT CONTRAST CLINICAL HISTORY: abdominal pain; TECHNIQUE: Low dose axial images,  sagittal and coronal reformations were obtained from the lung bases to the pubic symphysis.  Oral contrast was not administered. COMPARISON: Multiple prior CT and abdominal MRI, last CT dated 04/30/2018. FINDINGS: The visualized portion of the heart is unremarkable.  There is minimal right basilar atelectasis. Liver is enlarged measuring 24 cm with innumerable hypodense hepatic lesions seen consistent with known history of metastatic disease in this patient with history of neuroendocrine tumor.  There is no intra-or extrahepatic biliary ductal dilatation.  High density material, presumable sludge is seen within the gallbladder.  The stomach, pancreas, spleen, and right adrenal gland are unremarkable.  There is stable left adrenal thickening. Single punctate nonobstructing stone is seen within the right kidney.  No left renal stones.  No evidence of hydronephrosis.  Ureters are difficult to track, however no definite stones are seen along their expected courses.  Urinary bladder is unremarkable.  Uterus has been removed. Postsurgical changes are seen consistent with partial bowel resection.  The visualized loops of small and large bowel show no evidence of obstruction or inflammation.  Appendix is visualized and is unremarkable.  No free air or free fluid. Aorta tapers normally. No acute osseous abnormality identified. Subcutaneous soft tissue structures are unremarkable.     1. Hepatomegaly with known diffuse hepatic metastatic disease. 2. Otherwise no acute intra-abdominal abnormalities identified. 3. High density material within the gallbladder, presumed biliary sludge. 4. Punctate nonobstructing right renal stone. 5. Postsurgical changes and additional findings as detailed above. Electronically signed by: Renetta Clarke MD Date:    07/09/2018 Time:    20:47         Assessment/Plan       This is a 66 yo AA female who was admitted for AMS; L-renal consulted for    BREONNA stage I  -etiol include pre-renal ATN from  contrast/decreased po intake; ATN from infection; urine Na <20; Ct pelvis without reversible causes of BREONNA  -pt can still benefit from volume resuscitation - would rec another 2-3 L over next 24 hours  -no acute indication for RRT; had conversation with pt and daughter - pt expresses desire to forego RRT should she need it - she understands the risks and benefits and the daughter agrees that the pt's mental status is near baseline and that pt wishes should be respected   -please renally dose all meds    AGMA  -etiol likely combo lactic acidosis + renal failure   -combination AGMA + non AGMA  -agree with Abx and IVFs    Acute/subacute stroke  -MRI pending    Sepsis  -agree with broad spectrum Abx for now; rec random vanc levels before empiric dosing  -repeat lactic acid; BCx/UCx pending    Acute liver injury  -etiol likely 2/2 recent TACE procedure    Hypernatremia  -free water deficit approx 4-5L  -rec IVFs with 1/2 NS at 150-200/hr - rec q 4 hour BMP for now to document correction rate - each L will decrease Na by approx 2.5mEq - would not decrease serum Na by anymore than 6-8mEq/day  -could also switch to D5 if Na levels not responsive to 1/2 NS    Metastatic neuroendocrine tumor  -poor prognosis as pt is limited in therapy per heme/onc notes    Panda Goins II  [employer] renal [facility]  [phone]

## 2018-07-10 NOTE — PT/OT/SLP EVAL
Speech Language Pathology Evaluation  Cognitive-Communication Eval   Bedside Swallow Eval     Patient Name:  Lala Ames   MRN:  [id number]  Admitting Diagnosis: Sepsis with multi-organ dysfunction    Recommendations:                  General Recommendations:  Dysphagia therapy and Speech/language therapy  Diet recommendations:   , Clears   Aspiration Precautions: 1 bite/sip at a time, Alternating bites/sips, Assistance with meals, Frequent oral care, HOB to 90 degrees, Meds whole 1 at a time, Remain upright 30 minutes post meal, Small bites/sips and Standard aspiration precautions   General Precautions: Standard, fall (Peripheral IV in place on L side of neck)  Communication strategies:  none    History:     Past Medical History:   Diagnosis Date    Anticoagulant long-term use     Arthritis     Blood transfusion     Coronary artery disease     Diabetes mellitus     Diabetes mellitus, type 2     Diabetic retinopathy     Disorder of kidney and ureter     H/O coronary angiogram     Hypertension     Liver cancer     Lung metastases 12/5/2017    MI (myocardial infarction)     x3    Nausea 11/21/2017    Neuroendocrine carcinoma, 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    PDR (proliferative diabetic retinopathy)     Secondary malignant carcinoid tumor of liver     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       Past Surgical History:   Procedure Laterality Date    ABDOMINAL SURGERY      bowel had to be repaired after hysterectomy    CARDIAC CATHETERIZATION      COLECTOMY      r/t bowel injury from hysterectomy    COLONOSCOPY      CYST REMOVAL      soft tissue on forehead    EMBOLIZATION N/A 6/29/2018    Procedure: Embolization;  Surgeon: [facility] Diagnostic Provider;  Location: [facility] OR [address] [place] [address];  Service: General;  Laterality: N/A;    EYE SURGERY      HERNIA REPAIR      x2    HYSTERECTOMY      LIVER BIOPSY  07/2017     Chief Complaint      Fatigue (pt had a femoral artery angiogram  procedure on 6/29. Has been lethargic since then. Called Dr. Dorantes and was told to come to the ER. Family member reports pt was hyperglycemic last night, but that it improved today)   Family at bedside describe confusion for 1 week.      Subjective:      History of Present Illness:  Lala Ames is a 67 y.o. 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 The patient presented to [facility] on 7/9/2018 with a primary complaint of Fatigue (pt had a femoral artery angiogram procedure on 6/29. Has been lethargic since then. Called Dr. Dorantes and was told to come to the ER. Family member reports pt was hyperglycemic last night, but that it improved today)  And confusion. She had a TACE procedure done last week, and since then the family has described her as not orientated to place, making inappropriate statements and at times being combative and lethargic at other times. Daughter, who is primary caregiver, stopped oxycodone but pt remained confused. She describes decreased PO intake, decreased urine output. Pt denies nausea, vomiting, fevers, chills, SOB or chest pain.   Pt was brought to the ED for confusion, where she received IV fluids. When I went to see her she was orientated to person, place and time. She was not in any pain, but was generally fatigued and uncomfortable.       Social History: LIEN at this time, eval limited as pt had just returned from MRI and was  "fatigued    Prior Intubation HX:  None per this admit    Modified Barium Swallow: none per EMR    Chest X-Rays: No acute lobar consolidations or pneumothorax or pulmonary vascular congestion or pleural effusions    Prior diet: unknown at this time.    Occupation/Social History: Pt reports she has 11th grade education, PLOF is unknown at this time.     Subjective     SLP consulted for swallow and speech eval. Pt admitted for possible CVA work-up. Pt returned from MRI this date.   Patient goals: "I want some water."  No family in room during assessment.      Pain/Comfort:  · Pain Rating 1: 0/10  · Pain Rating Post-Intervention 2: 0/10    Objective:   Objective:   SLP consulted for swallow eval and speech eval.   Pt being worked-up for CVA.   MRI results pending at this time.       COGNITIVE STATUS:  Behavioral Observations: alert and appropriate-  Memory: DNT as this time, ongoing assessment  Orientation: oriented x4  Attention: good   Problem Solving: fair for general problem solving situations      LANGUAGE:     Receptive Language:   Simple y/n Questions: 4/5 questions answered correctly, 80% accuracy  Complex y/n Questions: 5/6 questions answered correctly, 83% accuracy  1 Step Directions: 100% accuracy  2 Step Directions: 80% accuracy    Expressive Language:   Naming: DNT  Sentence Completion: DNT  Responsive Speech: DNT      Motor Speech: No dysarthric speech noted    Voice: clear voice but low volume    Reading: DNT    Writing: DNT    Visual-Spatial: DNT, ongoing assessment    Oral Musculature Evaluation  · Oral Musculature: general weakness, facial asymmetry present, left weakness  · Dentition: present and adequate  · Secretion Management:  (spitting up clear and light phelgm )  · Mucosal Quality: dry  · Mandibular Strength and Mobility:  (dcr'd )  · Oral Labial Strength and Mobility:  (fair)  · Lingual Strength and Mobility: impaired strength  · Velar Elevation:  (unable to view)  · Buccal Strength and " Mobility:  (fair)  · Volitional Cough: weak cough no expectoration of mucous; limited mouth opening was noted during oral mech exam  · Volitional Swallow: timely swallow per palpation  · Voice Prior to PO Intake: clear, low volume  · Oral Musculature Comments: L facial droop is noted during labial retraction    Bedside Swallow Eval:   Consistencies Assessed:  · Thin liquids ice chips, water by tsp, and straw  · Puree pudding by tsp x2     Oral Phase:   · Slow oral transit time   · Fair bolus control  · Fair labial seal is noted  · No bolus holding or pooling noted HOWEVER, pt noted to try to spit up oral secretions post PO trials, pt reports feeling N/V symptoms this am.     Pharyngeal Phase:   timely swallow reflex   No coughing or choking or sputtering with liquids OR wet voice  No change in facial color and no desat noted with PO trials of water and pudding.   Limited trials presented as pt with N/V at bedside and emesis bag in her hand.     Compensatory Strategies  · Small bites and sips  · Free water as tolerated    Treatment: ongoing assessment of cognitive/communication and swallow assessment for diet advancement as able.     Assessment:     Lala Ames is a 67 y.o. female who presents with CVA and demonstrates a SLP diagnosis of oral dysphagia, impaired oral motor coordination and strength  and mild cognitive-communication deficits. ONGOING assessment of cognition in progress.       Goals:    SLP Goals        Problem: SLP Goal    Goal Priority Disciplines Outcome   SLP Goal     SLP Ongoing (interventions implemented as appropriate)   Description:  Short Term Goals:  1. Pt will participate in ongoing swallow assessment to determine least restrictive diet.   2. Pt will tolerate clear liquid diet with no audible s/s of dysphagia and good oral clearance.   3. Pt will consume 50%-75% of clear liquid diet without overt s/s of aspiration given min assist.   4. Pt will tolerate advanced trials of  "solids and pureed with no audible s/s of dysphagia.   5. Pt state orientation info x4 with min cues.   6. Pt will follow complex commands with 90% acc with min A.   7. Pt will complete basic OMEx with min A to increase oral motor coordination/strength.                           Plan:     · Patient to be seen:  3 x/week   · Plan of Care expires:  08/08/18  · Plan of Care reviewed with:  patient (RN)   · SLP Follow-Up:  Yes       Discharge recommendations:  Discharge Facility/Level Of Care Needs:  (ongoing needs pending PT/OT recs)     Time Tracking:     SLP Treatment Date:   07/10/18  Speech Start Time:  1025  Speech Stop Time:  1045     Speech Total Time (min):  20 min    Billable Minutes: Eval 10  and Eval Swallow and Oral Function 10     "I certify that I was present in the room directing the student and service delivery and guiding them using my skilled judgement. As the co-signing therapist, I have reviewed the student's documentation, and am responsible for the treatment, assessment and plan."       Moira ARANGO    Clinician  07/10/2018    VERONICA Collado, [facility]-SLP  Speech Pathologist  7/10/2018        "

## 2018-07-10 NOTE — PLAN OF CARE
Problem: Patient Care Overview  Goal: Plan of Care Review  Outcome: Ongoing (interventions implemented as appropriate)  Pt. AAOx4. BP elevated, home meds restarted. Pt on RA with no respiratory difficulty. MRI showed stroke today. Neurology, Nephrology, IR, Surgery all actively involved in plan of care. Nausea unresolved, denies medication. Family at bedside. Siderails up x2. Pt belongings within reach, call light within reach, Bed in lowest position. Family and patient updated on plan of care. Palliative care to provide options and update goals of care. Will continue to monitor.

## 2018-07-10 NOTE — PLAN OF CARE
Dr Goins at bedside. Notified of ABG results, CBG of 52, D50 12.5 gm given.     Daughter and son at bedside.

## 2018-07-10 NOTE — CONSULTS
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  Patient underwent an uncomplicated TACE procedure 6/29, and reportedly was found to be lethargic following the procedure, this progressed for several weeks, until her family brought her to the ED out of concern for her AMS.  Upon presentation, patient is afebrile, and hypertensive, but otherwise stable.  She was found to have significant electrolyte derangements, including a signficant BREONNA, hypernatremia to 150.  She also has a WBC of 25. CT abdomen pelvis demonstrates known hepatic metastases, additional imaging of patient head consistent with L basal ganglia infarct.  Patient has been seen by nephrology.  Additional consults were put in place to IR, neurology, and palliative care.    Allergies:  Review of patient's allergies indicates:   Allergen Reactions    Epinephrine     Diltiazem Rash and Other (See Comments)     Skin peels    Morphine Rash       Home Medications:  Current Facility-Administered Medications on File Prior to Encounter   Medication    octreotide (SANDOSTATIN) 500 mcg in sodium chloride 0.9% 50 mL IVPB     Current Outpatient Prescriptions on File Prior to Encounter   Medication Sig    amLODIPine (NORVASC) 10 MG tablet Take 1 tablet (10 mg total) by mouth once daily.    amoxicillin-pot clavulanate 250-62.5 mg/5ml (AUGMENTIN) 250-62.5 mg/5 mL suspension Take 10 mLs (500 mg total) by mouth 2 (two) times daily.    aspirin (ECOTRIN) 81 MG EC tablet Take 1 tablet (81 mg total) by mouth once daily.    ergocalciferol (ERGOCALCIFEROL) 50,000 unit Cap Take 1 capsule (50,000 Units total) by mouth every 7  days.    blood sugar diagnostic (BLOOD GLUCOSE TEST) Strp 1 strip by Misc.(Non-Drug; Combo Route) route 3 (three) times daily as needed.    blood-glucose meter (FREESTYLE SYSTEM KIT) kit 1 each by Other route 2 (two) times daily. Use as instructed    lancets (ONETOUCH ULTRASOFT LANCETS) Misc 1 each by Misc.(Non-Drug; Combo Route) route 2 (two) times daily as needed.    nitroGLYCERIN (NITROSTAT) 0.4 MG SL tablet Place 1 tablet (0.4 mg total) under the tongue every 5 (five) minutes as needed for Chest pain.    ondansetron (ZOFRAN) 4 MG tablet Take 1 tablet (4 mg total) by mouth every 6 (six) hours as needed for Nausea.    ondansetron (ZOFRAN-ODT) 4 MG TbDL Dissolve 1 tablet (4 mg total) by mouth every 6 (six) hours as needed.    oxyCODONE (ROXICODONE) 10 mg Tab immediate release tablet Take 1 tablet (10 mg total) by mouth every 3 (three) hours as needed for Pain.    oxyCODONE (ROXICODONE) 5 MG immediate release tablet Take 1 tablet (5 mg total) by mouth every 6 (six) hours as needed for Pain.    promethazine (PHENERGAN) 12.5 MG Supp Place 1 suppository (12.5 mg total) rectally every 6 (six) hours as needed.    ranolazine (RANEXA) 500 MG Tb12 Take 1 tablet (500 mg total) by mouth 2 (two) times daily.       Past Medical History:   Diagnosis Date    Anticoagulant long-term use     Arthritis     Blood transfusion     Coronary artery disease     Diabetes mellitus     Diabetes mellitus, type 2     Diabetic retinopathy     Disorder of kidney and ureter     H/O coronary angiogram     Hypertension     Liver cancer     Lung metastases 12/5/2017    MI (myocardial infarction)     x3    Nausea 11/21/2017    Neuroendocrine carcinoma, 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    PDR (proliferative diabetic retinopathy)     Secondary malignant carcinoid tumor of liver     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     Past Surgical History:   Procedure Laterality Date    ABDOMINAL SURGERY      bowel had to  be repaired after hysterectomy    CARDIAC CATHETERIZATION      COLECTOMY      r/t bowel injury from hysterectomy    COLONOSCOPY      CYST REMOVAL      soft tissue on forehead    EMBOLIZATION N/A 6/29/2018    Procedure: Embolization;  Surgeon: [facility] Diagnostic Provider;  Location: [facility] OR [address] [place] [address];  Service: General;  Laterality: N/A;    EYE SURGERY      HERNIA REPAIR      x2    HYSTERECTOMY      LIVER BIOPSY  07/2017     Family History   Problem Relation Age of Onset    Mental illness Mother     Diabetes Father     Cancer Father         pancreatic    Diabetes Sister     Stomach cancer Sister     Cancer Sister         gastric    Kidney disease Brother     Diabetes Daughter     No Known Problems Daughter     No Known Problems Son     No Known Problems Son     No Known Problems Brother     No Known Problems Brother     No Known Problems Brother     Cancer Maternal Aunt     Cancer Maternal Aunt      Social History   Substance Use Topics    Smoking status: Never Smoker    Smokeless tobacco: Never Used    Alcohol use No      Comment: Occasionally        Review of Systems:  Unable to obtain, patient non verbal    OBJECTIVE:     Vital Signs:  Temp: 98 °F (36.7 °C) (07/10/18 1115)  Pulse: 63 (07/10/18 1115)  Resp: 14 (07/10/18 1115)  BP: (!) 186/76 (07/10/18 1100)  SpO2: 100 % (07/10/18 1115)    Physical Exam:  General: well developed, well nourished  HEENT: normocephalic, atraumatic,  mucous membranes moist, EOM intact, no scleral icterus  Neck: supple, symmetrical, trachea midline, no JVD  Lungs:  clear to auscultation bilaterally and normal respiratory effort  Cardiovascular: regular rate and rhythm.    Extremities: no cyanosis or edema, or clubbing, distal pulses palpable and symmetric  Abdomen: soft, tender to palpation in RUQ, guarding to RUQ, no rebound, well healed midline surgical incision  Skin: Skin color, texture, turgor normal. No rashes or lesions  Musculoskeletal:no clubbing,  cyanosis, no deformities  Neurologic: No focal numbness or weakness  Psych/Behavioral:  Alert and oriented, appropriate affect.    Laboratory:  Labs Reviewed   CBC W/ AUTO DIFFERENTIAL - Abnormal; Notable for the following:        Result Value    WBC 25.68 (*)     RDW 14.6 (*)     Gran% 93.0 (*)     Lymph% 2.0 (*)     All other components within normal limits   COMPREHENSIVE METABOLIC PANEL - Abnormal; Notable for the following:     Sodium 154 (*)     Potassium 5.4 (*)     Chloride 118 (*)     CO2 21 (*)     Glucose 62 (*)     BUN, Bld 127 (*)     Creatinine 3.7 (*)     Albumin 2.9 (*)     Total Bilirubin 2.8 (*)     Alkaline Phosphatase 293 (*)     AST 63 (*)      (*)     eGFR if  14 (*)     eGFR if non  12 (*)     All other components within normal limits   LIPASE - Abnormal; Notable for the following:     Lipase 158 (*)     All other components within normal limits   URINALYSIS - Abnormal; Notable for the following:     Protein, UA 2+ (*)     All other components within normal limits   TROPONIN I - Abnormal; Notable for the following:     Troponin I 0.072 (*)     All other components within normal limits   LACTIC ACID, PLASMA - Abnormal; Notable for the following:     Lactate (Lactic Acid) 2.8 (*)     All other components within normal limits   URINALYSIS MICROSCOPIC - Abnormal; Notable for the following:     Bacteria, UA Few (*)     Yeast, UA Few (*)     All other components within normal limits   SODIUM, URINE, RANDOM - Abnormal; Notable for the following:     Sodium Urine Random <20 (*)     All other components within normal limits   CULTURE, URINE   PROCALCITONIN   SODIUM, URINE, RANDOM   CREATININE, URINE, RANDOM   CREATININE, URINE, RANDOM   PROTEIN / CREATININE RATIO, URINE   URIC ACID   CK   TSH   AMMONIA   LACTATE DEHYDROGENASE       Diagnostic Results:  Imaging Results          MRI Brain (Stroke Protocol) Without Contrast (Final result)  Result time 07/10/18  10:12:49    Final result by Teresa Knox MD (07/10/18 10:12:49)                 Impression:      Acute vs. subacute infarct involving the left basal ganglia.  No evidence of significant mass effect or hemorrhagic conversion.      Electronically signed by: Teresa Knox MD  Date:    07/10/2018  Time:    10:12             Narrative:    EXAMINATION:  MRI BRAIN (STROKE PROTOCOL) WITHOUT CONTRAST    CLINICAL HISTORY:  confusion, basal ganglia infarct;    TECHNIQUE:  Multiplanar, multisequence MRI of the brain was performed without intravenous contrast.    COMPARISON:  Prior CT from 07/09/2018    FINDINGS:  There is an area of restricted diffusion centered in the left basal ganglia consistent with acute or subacute infarct.  There is no evidence of hemorrhagic conversion or significant mass effect.  No additional areas of infarction are identified.    The ventricular system is of normal size for age and midline.  Mild confluent areas of increased T2/FLAIR signal are present in a periventricular distribution suggesting mild chronic microvascular ischemic change.  There are no extra-axial masses or fluid collections.    The orbits orbital contents sella turcica and pituitary gland have a normal appearance.  Visualized structures of the craniocervical junction and skull base are unremarkable.                               CT Abdomen Pelvis  Without Contrast (Final result)  Result time 07/09/18 20:47:37    Final result by Renetta Clarke MD (07/09/18 20:47:37)                 Impression:      1. Hepatomegaly with known diffuse hepatic metastatic disease.  2. Otherwise no acute intra-abdominal abnormalities identified.  3. High density material within the gallbladder, presumed biliary sludge.  4. Punctate nonobstructing right renal stone.  5. Postsurgical changes and additional findings as detailed above.      Electronically signed by: Renetta Clarke MD  Date:    07/09/2018  Time:    20:47             Narrative:     EXAMINATION:  CT ABDOMEN PELVIS WITHOUT CONTRAST    CLINICAL HISTORY:  abdominal pain;    TECHNIQUE:  Low dose axial images, sagittal and coronal reformations were obtained from the lung bases to the pubic symphysis.  Oral contrast was not administered.    COMPARISON:  Multiple prior CT and abdominal MRI, last CT dated 04/30/2018.    FINDINGS:  The visualized portion of the heart is unremarkable.  There is minimal right basilar atelectasis.    Liver is enlarged measuring 24 cm with innumerable hypodense hepatic lesions seen consistent with known history of metastatic disease in this patient with history of neuroendocrine tumor.  There is no intra-or extrahepatic biliary ductal dilatation.  High density material, presumable sludge is seen within the gallbladder.  The stomach, pancreas, spleen, and right adrenal gland are unremarkable.  There is stable left adrenal thickening.    Single punctate nonobstructing stone is seen within the right kidney.  No left renal stones.  No evidence of hydronephrosis.  Ureters are difficult to track, however no definite stones are seen along their expected courses.  Urinary bladder is unremarkable.  Uterus has been removed.    Postsurgical changes are seen consistent with partial bowel resection.  The visualized loops of small and large bowel show no evidence of obstruction or inflammation.  Appendix is visualized and is unremarkable.  No free air or free fluid.    Aorta tapers normally.    No acute osseous abnormality identified. Subcutaneous soft tissue structures are unremarkable.                               CT Head Without Contrast (Final result)     Abnormal  Result time 07/09/18 17:38:58    Final result by Jose Enrique Richard MD (07/09/18 17:38:58)                 Impression:      Left basal ganglia (corpus stratum) suspected acute/subacute infarct.    No intracranial hemorrhage or acute large vascular territory cortical infarct.    This report was flagged in Epic as  abnormal.      Electronically signed by: Jose Enrique Richard MD  Date:    07/09/2018  Time:    17:38             Narrative:    EXAMINATION:  CT HEAD WITHOUT CONTRAST    CLINICAL HISTORY:  Confusion/delirium, altered LOC, unexplained;    TECHNIQUE:  Low dose axial CT images obtained throughout the head without intravenous contrast. Sagittal and coronal reconstructions were performed.    COMPARISON:  None.    FINDINGS:  Intracranial compartment:    Mild generalized cerebral volume loss.  The ventricles are midline without distortion by mass effect or evidence of acute hydrocephalus.  No extra-axial blood or fluid collections.    There is an area of hypoattenuation without volume loss within the anterior limb of the left internal capsule which also extends to the left caudate and left subinsular region and anterior aspect of the left lenticular nucleus concerning for acute/subacute infarct.  No parenchymal mass, hemorrhage, edema or major vascular distribution infarct.    Skull/extracranial contents (limited evaluation): No fracture. Mastoid air cells and paranasal sinuses are essentially clear.  Visualized portions of the orbits are within normal limits.                               X-Ray Chest AP Portable (Final result)  Result time 07/09/18 14:56:06    Final result by Darwin Ashford MD (07/09/18 14:56:06)                 Impression:      1. No acute intrathoracic processes.      Electronically signed by: Darwin Ashford MD  Date:    07/09/2018  Time:    14:56             Narrative:    EXAMINATION:  XR CHEST AP PORTABLE    CLINICAL HISTORY:  Epigastric pain    TECHNIQUE:  Single frontal view of the chest was performed.    COMPARISON:  Chest 09/19/2017    FINDINGS:  There is a poor inspiratory effort when compared to the previous study.  No acute lobar consolidations or pneumothorax or pulmonary vascular congestion or pleural effusions.  Heart size is within normal limits.  There is faint atherosclerotic changes of the aortic  arch.  There are spondylotic changes in the dorsal spine.                                ASSESSMENT:     68 yo F w/ PMH NET s/p resection, and TACE of liver metastasis who presents with multiorgan system failure, and sepsis    PLAN:     Will obtain RUQ US to r/o cholangitis as potential source of sepsis/AMS  Would recommend continuing supportive treatment  Additional recs to follow

## 2018-07-10 NOTE — CONSULTS
[facility]  Neurology Consult      Patient Name: Lala Ames  MRN: [id number]   Admission Date: 7/9/2018  Principal Problem:Sepsis with multi-organ dysfunction    Subjective:   66 y/o AA female with a pmhx of CAD, DMII with microvascular complications, hypertension and metastatic neuroendrocrine tumor who underwent TACE procedure for unresectable liver mets on 6/29/18. Per family, patient has been lethargic since discharge, elevated BG and BP with AMS x 2 weeks which prompted arrival to the ED. Patient was found to be septic with multiorgan failure. Neurology was then consulted after CT can of the head showed left basal ganglia acute vs subacute infarct.   Patient was examined at bedside this morning. Patient was cooperative at bedside. She was alert and oriented and participated in the physical exam. Patient has no new complaints. Denies any fever, chills, n/v, chest pain, SOB, abdominal pain or urinary and fecal incontinence.       Past Medical History:   Diagnosis Date    Anticoagulant long-term use     Arthritis     Blood transfusion     Coronary artery disease     Diabetes mellitus     Diabetes mellitus, type 2     Diabetic retinopathy     Disorder of kidney and ureter     H/O coronary angiogram     Hypertension     Liver cancer     Lung metastases 12/5/2017    MI (myocardial infarction)     x3    Nausea 11/21/2017    Neuroendocrine carcinoma, 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    PDR (proliferative diabetic retinopathy)     Secondary malignant carcinoid tumor of liver     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       Past Surgical History:   Procedure Laterality Date    ABDOMINAL SURGERY      bowel had to be repaired after hysterectomy    CARDIAC CATHETERIZATION      COLECTOMY      r/t bowel injury from hysterectomy    COLONOSCOPY      CYST REMOVAL      soft tissue on forehead    EMBOLIZATION N/A 6/29/2018    Procedure: Embolization;  Surgeon:  [facility];  Location: [facility] OR [address] [place] [address];  Service: General;  Laterality: N/A;    EYE SURGERY      HERNIA REPAIR      x2    HYSTERECTOMY      LIVER BIOPSY  07/2017       Review of patient's allergies indicates:   Allergen Reactions    Epinephrine     Diltiazem Rash and Other (See Comments)     Skin peels    Morphine Rash       Current Neurological Medications:     Current Facility-Administered Medications on File Prior to Encounter   Medication    octreotide (SANDOSTATIN) 500 mcg in sodium chloride 0.9% 50 mL IVPB     Current Outpatient Prescriptions on File Prior to Encounter   Medication Sig    amLODIPine (NORVASC) 10 MG tablet Take 1 tablet (10 mg total) by mouth once daily.    amoxicillin-pot clavulanate 250-62.5 mg/5ml (AUGMENTIN) 250-62.5 mg/5 mL suspension Take 10 mLs (500 mg total) by mouth 2 (two) times daily.    aspirin (ECOTRIN) 81 MG EC tablet Take 1 tablet (81 mg total) by mouth once daily.    ergocalciferol (ERGOCALCIFEROL) 50,000 unit Cap Take 1 capsule (50,000 Units total) by mouth every 7 days.    blood sugar diagnostic (BLOOD GLUCOSE TEST) Strp 1 strip by Misc.(Non-Drug; Combo Route) route 3 (three) times daily as needed.    blood-glucose meter (FREESTYLE SYSTEM KIT) kit 1 each by Other route 2 (two) times daily. Use as instructed    lancets (ONETOUCH ULTRASOFT LANCETS) Misc 1 each by Misc.(Non-Drug; Combo Route) route 2 (two) times daily as needed.    nitroGLYCERIN (NITROSTAT) 0.4 MG SL tablet Place 1 tablet (0.4 mg total) under the tongue every 5 (five) minutes as needed for Chest pain.    ondansetron (ZOFRAN) 4 MG tablet Take 1 tablet (4 mg total) by mouth every 6 (six) hours as needed for Nausea.    ondansetron (ZOFRAN-ODT) 4 MG TbDL Dissolve 1 tablet (4 mg total) by mouth every 6 (six) hours as needed.    oxyCODONE (ROXICODONE) 10 mg Tab immediate release tablet Take 1 tablet (10 mg total) by mouth every 3 (three) hours as needed for Pain.    oxyCODONE  (ROXICODONE) 5 MG immediate release tablet Take 1 tablet (5 mg total) by mouth every 6 (six) hours as needed for Pain.    promethazine (PHENERGAN) 12.5 MG Supp Place 1 suppository (12.5 mg total) rectally every 6 (six) hours as needed.    ranolazine (RANEXA) 500 MG Tb12 Take 1 tablet (500 mg total) by mouth 2 (two) times daily.      Family History     Problem Relation (Age of Onset)    Cancer Father, Sister, Maternal Aunt, Maternal Aunt    Diabetes Father, Sister, Daughter    Kidney disease Brother    Mental illness Mother    No Known Problems Daughter, Son, Son, Brother, Brother, Brother    Stomach cancer Sister        Social History Main Topics    Smoking status: Never Smoker    Smokeless tobacco: Never Used    Alcohol use No      Comment: Occasionally    Drug use: No    Sexual activity: Not Currently     Birth control/ protection: Surgical     Review of Systems   All other systems reviewed and are negative.   as per HPI   Objective:     Vital Signs (Most Recent):  Temp: 97.8 °F (36.6 °C) (07/10/18 0730)  Pulse: 61 (07/10/18 0845)  Resp: 15 (07/10/18 0845)  BP: (!) 172/74 (07/10/18 0830)  SpO2: 100 % (07/10/18 0845) Vital Signs (24h Range):  Temp:  [97.4 °F (36.3 °C)-98.2 °F (36.8 °C)] 97.8 °F (36.6 °C)  Pulse:  [55-89] 61  Resp:  [10-28] 15  SpO2:  [97 %-100 %] 100 %  BP: (142-234)/() 172/74     Weight: 56.6 kg (124 lb 12.5 oz)  Body mass index is 26.08 kg/m².    Physical Exam   Neurological:   Reflex Scores:       Brachioradialis reflexes are 2+ on the right side and 2+ on the left side.       Patellar reflexes are 2+ on the right side and 2+ on the left side.       Achilles reflexes are 2+ on the right side and 2+ on the left side.        NEUROLOGICAL EXAMINATION:     MENTAL STATUS   Level of consciousness: drowsy ,  arousable by verbal stimuli    MOTOR EXAM   Left arm tone: normal  Right leg tone: normal    Strength   Right biceps: 5/5  Right triceps: 5/5  Left triceps: 5/5  Right wrist extension:  5/5  Left wrist extension: 5/5  Right hamstrin/5  Left hamstrin/5    REFLEXES     Reflexes   Right brachioradialis: 2+  Left brachioradialis: 2+  Right patellar: 2+  Left patellar: 2+  Right achilles: 2+  Left achilles: 2+    SENSORY EXAM   Light touch normal.   Proprioception normal.     GAIT AND COORDINATION        Unable to assess        Significant Labs:   CBC:   Recent Labs  Lab 18  1552 07/10/18  0406   WBC 25.68* 14.60*   HGB 15.1 10.4*   HCT 45.6 32.2*    186       Recent Labs  Lab 07/10/18  0406   CALCIUM 8.6*   PROT 5.0*   *   K 4.8   CO2 20*   *   *   CREATININE 2.9*   ALKPHOS 183*   *   AST 40   BILITOT 2.0*         Significant Imaging:  Imaging Results          CT Abdomen Pelvis  Without Contrast (Final result)  Result time 18 20:47:37    Final result by Renetta Clarke MD (18 20:47:37)                 Impression:      1. Hepatomegaly with known diffuse hepatic metastatic disease.  2. Otherwise no acute intra-abdominal abnormalities identified.  3. High density material within the gallbladder, presumed biliary sludge.  4. Punctate nonobstructing right renal stone.  5. Postsurgical changes and additional findings as detailed above.      Electronically signed by: Renetta Clarke MD  Date:    2018  Time:    20:47             Narrative:    EXAMINATION:  CT ABDOMEN PELVIS WITHOUT CONTRAST    CLINICAL HISTORY:  abdominal pain;    TECHNIQUE:  Low dose axial images, sagittal and coronal reformations were obtained from the lung bases to the pubic symphysis.  Oral contrast was not administered.    COMPARISON:  Multiple prior CT and abdominal MRI, last CT dated 2018.    FINDINGS:  The visualized portion of the heart is unremarkable.  There is minimal right basilar atelectasis.    Liver is enlarged measuring 24 cm with innumerable hypodense hepatic lesions seen consistent with known history of metastatic disease in this patient with history of  neuroendocrine tumor.  There is no intra-or extrahepatic biliary ductal dilatation.  High density material, presumable sludge is seen within the gallbladder.  The stomach, pancreas, spleen, and right adrenal gland are unremarkable.  There is stable left adrenal thickening.    Single punctate nonobstructing stone is seen within the right kidney.  No left renal stones.  No evidence of hydronephrosis.  Ureters are difficult to track, however no definite stones are seen along their expected courses.  Urinary bladder is unremarkable.  Uterus has been removed.    Postsurgical changes are seen consistent with partial bowel resection.  The visualized loops of small and large bowel show no evidence of obstruction or inflammation.  Appendix is visualized and is unremarkable.  No free air or free fluid.    Aorta tapers normally.    No acute osseous abnormality identified. Subcutaneous soft tissue structures are unremarkable.                               CT Head Without Contrast (Final result)     Abnormal  Result time 07/09/18 17:38:58    Final result by Jose Enrique Richard MD (07/09/18 17:38:58)                 Impression:      Left basal ganglia (corpus stratum) suspected acute/subacute infarct.    No intracranial hemorrhage or acute large vascular territory cortical infarct.    This report was flagged in Epic as abnormal.      Electronically signed by: Jose Enrique Richard MD  Date:    07/09/2018  Time:    17:38             Narrative:    EXAMINATION:  CT HEAD WITHOUT CONTRAST    CLINICAL HISTORY:  Confusion/delirium, altered LOC, unexplained;    TECHNIQUE:  Low dose axial CT images obtained throughout the head without intravenous contrast. Sagittal and coronal reconstructions were performed.    COMPARISON:  None.    FINDINGS:  Intracranial compartment:    Mild generalized cerebral volume loss.  The ventricles are midline without distortion by mass effect or evidence of acute hydrocephalus.  No extra-axial blood or fluid  collections.    There is an area of hypoattenuation without volume loss within the anterior limb of the left internal capsule which also extends to the left caudate and left subinsular region and anterior aspect of the left lenticular nucleus concerning for acute/subacute infarct.  No parenchymal mass, hemorrhage, edema or major vascular distribution infarct.    Skull/extracranial contents (limited evaluation): No fracture. Mastoid air cells and paranasal sinuses are essentially clear.  Visualized portions of the orbits are within normal limits.                               X-Ray Chest AP Portable (Final result)  Result time 07/09/18 14:56:06    Final result by Darwin Ashford MD (07/09/18 14:56:06)                 Impression:      1. No acute intrathoracic processes.      Electronically signed by: Darwin Ashford MD  Date:    07/09/2018  Time:    14:56             Narrative:    EXAMINATION:  XR CHEST AP PORTABLE    CLINICAL HISTORY:  Epigastric pain    TECHNIQUE:  Single frontal view of the chest was performed.    COMPARISON:  Chest 09/19/2017    FINDINGS:  There is a poor inspiratory effort when compared to the previous study.  No acute lobar consolidations or pneumothorax or pulmonary vascular congestion or pleural effusions.  Heart size is within normal limits.  There is faint atherosclerotic changes of the aortic arch.  There are spondylotic changes in the dorsal spine.                                Assessment and Plan:   66 y/o AA female with a pmhx of CAD, DMII with microvascular complications, hypertension and metastatic neuroendrocrine tumor who underwent TACE procedure for unresectable liver mets on 6/29/18 who was found to have acute vs subacute infarct on CT head noncontrast. Patient's presentation is likely 2/2 to elevated blood pressure as basal ganglia is commonly infarcted. Further evaluation of the neuroendocrine tumor is indicated.     -Start patient on ASA 81mg and Plavix 75mg PO daily   -Start  Atorvastatin 40mg PO daily  -Order; CTA head and neck and U/S carotid arteries bl if stenotic vessels seen on CT scan   -Order MRA Head and neck without contrast.   - Order Echo with bubble study,    - Labs: TSH, RPR, B12, Folate, Lipid Panel   -Have patient follow up with primary care doctor for management of comorbid conditions   -Have patient follow up in neurology clinic in 4 weeks after discharge      -Educated patient on risk factors for stroke including diabetes, hypertension, hyperlipidemia, tobacco smoking  -Educated patient on signs/symptoms of stroke and to call 911 immediately if such occurs          Active Diagnoses:    Diagnosis Date Noted POA    PRINCIPAL PROBLEM:  Sepsis with multi-organ dysfunction [A41.9, R65.20] 07/09/2018 Yes    Cerebrovascular accident (CVA) [I63.9] 07/09/2018 Yes      Problems Resolved During this Admission:    Diagnosis Date Noted Date Resolved POA       VTE Risk Mitigation         Ordered     heparin (porcine) injection 5,000 Units  Every 8 hours      07/09/18 2128     IP VTE HIGH RISK PATIENT  Once      07/09/18 2128     Place sequential compression device  Until discontinued      07/09/18 2128          Donal Mitchell MD  Family Medicine, PGY-1   [facility]

## 2018-07-10 NOTE — PLAN OF CARE
TN met with patient at bedside. No HH or DME needs prior. Patient lives at home with son, grandsons, and spouse. Independent with adl's prior to admit and initial procedure.     Chief Complaint      Fatigue (pt had a femoral artery angiogram procedure on 6/29. Has been lethargic since then. Called Dr. Dorantes and was told to come to the ER. Family member reports pt was hyperglycemic last night, but that it improved today)   Family at bedside describe confusion for 1 week.     Future Appointments  Date Time Provider Department Center   7/30/2018 8:30 AM [facility]-MRI1 [facility] MRI [facility]   8/16/2018 1:30 PM Micahela Modi MD [facility] NEPHRO [facility] WEST   9/19/2018 8:30 AM Yang Child NP [facility] CARDIO [address]   10/18/2018 7:20 AM [facility] LAB [facility] LAB [facility]   10/25/2018 10:40 AM Nidia Melendez NP [facility] FAM MED RAJ ACC   4/23/2019 9:30 AM [facility] XR1 [facility] XRAY [facility]   4/23/2019 10:00 AM UROLOGY CLINIC [facility] UROLOGY [facility] GOLD        07/10/18 1504   Discharge Assessment   Assessment Type Discharge Planning Assessment   Confirmed/corrected address and phone number on facesheet? Yes   Assessment information obtained from? Patient   Communicated expected length of stay with patient/caregiver yes   Prior to hospitilization cognitive status: Alert/Oriented   Prior to hospitalization functional status: Independent   Current cognitive status: Alert/Oriented   Current Functional Status: Independent   Lives With spouse   Able to Return to Prior Arrangements yes   Is patient able to care for self after discharge? Yes   Who are your caregiver(s) and their phone number(s)? spouse- [phone]   Patient's perception of discharge disposition home or selfcare   Readmission Within The Last 30 Days no previous admission in last 30 days   Patient currently being followed by outpatient case management? Yes   Patient currently receives any other outside agency services? No   Equipment Currently Used at Home none   Do you  have any problems affording any of your prescribed medications? No   Is the patient taking medications as prescribed? yes   Does the patient have transportation home? Yes   Transportation Available family or friend will provide   Does the patient receive services at the Coumadin Clinic? No   Discharge Plan A Home with family   Discharge Plan B Home with family;Home Health   Patient/Family In Agreement With Plan yes

## 2018-07-10 NOTE — PLAN OF CARE
Problem: Patient Care Overview  Goal: Plan of Care Review  Outcome: Ongoing (interventions implemented as appropriate)  Patient is progressing toward goals. Pt AAO. Denies pain. Afebrile and on broad spectrum abx. No signs of pressure ulcer. Skin intact. Blood glucose monitored. Alvarenga patent and draining clear, yellow urine. Call light within reach. Bed in low position. Remains free from fall/injury. Will continue to monitor.

## 2018-07-10 NOTE — PLAN OF CARE
Notified Dr Hewitt, Pt's last /95. Pt does not have prn BP meds.     Dr aware and will order med. Will cont to monitor.

## 2018-07-10 NOTE — PROGRESS NOTES
Pharmacist Renal Dose Adjustment Note    Lala Ames is a 67 y.o. female being treated with the medication Vancomycin    Patient Data:    Vital Signs (Most Recent):  Temp: 98.2 °F (36.8 °C) (07/09/18 2006)  Pulse: 76 (07/09/18 2006)  Resp: 16 (07/09/18 2006)  BP: (!) 170/65 (07/09/18 2006)  SpO2: 98 % (07/09/18 2006)   Vital Signs (72h Range):  Temp:  [97.4 °F (36.3 °C)-98.2 °F (36.8 °C)]   Pulse:  [76-83]   Resp:  [16-20]   BP: (164-176)/(65-77)   SpO2:  [98 %-100 %]        Recent Labs     Lab 07/09/18  1552   CREATININE 3.7*     Serum creatinine: 3.7 mg/dL (H) 07/09/18 1552  Estimated creatinine clearance: 14.3 mL/min (A)    Medication:Vancomycin dose: 1 gm frequency q 24 hours will be changed to medication:Vancomycin dose: 1 gm frequency: x 1 dose and draw random level in 24 hours and re-dose.     Pharmacist's Name: Alex De La Fuente  Pharmacist's Extension: [phone]

## 2018-07-10 NOTE — TELEPHONE ENCOUNTER
----- Message from Ovidio Mcguire sent at 7/10/2018  8:47 AM CDT -----  Contact: Daughter  Will like a call from Colette in regards to pt now in hospital due to a stroke     Contact::[phone]

## 2018-07-10 NOTE — CONSULTS
"  [facility]  Adult Nutrition  Consult Note    SUMMARY     Recommendations    Recommendation/Intervention:   1. Boost breeze tid with CL diet   2. Advance diet as able per SLP to 2000 ADA; cardiac   3. RD to monitor progress and reassess need for nutrition support if warranted    Goals: Meet >85% EEN  Nutrition Goal Status: new  Communication of RD Recs: reviewed with RN    D/C planning: Too soon to determine    Reason for Assessment    Reason for Assessment: consult  Diagnosis:  (Sepsis)  Relevant Medical History: DM, Liver Ca with mets; NET; CAD    General Information Comments: SLP advanced diet to CL. Pt denies n/v. Reports weight loss over past 9 months.  Pt with moderate protein loss in extremities and clavicle. Adequate fat mass. Noted prev adm weights noted: 77 kg on 10/2017. + sepsis with BREONNA.    Nutrition/Diet History    Food Preferences: Denies cultural, [employer] food preferences  Do you have any cultural, spiritual, [employer] conflicts, given your current situation?: none  Factors Affecting Nutritional Intake:  (cl diet)    Anthropometrics    Temp: 98 °F (36.7 °C)  Height Method: Stated  Height: 4' 10" (147.3 cm)  Height (inches): 58 in  Weight Method: Bed Scale  Weight: 56.6 kg (124 lb 12.5 oz)  Weight (lb): 124.78 lb  Ideal Body Weight (IBW), Female: 90 lb  % Ideal Body Weight, Female (lb): 138.64 lb  BMI (Calculated): 26.1  BMI Grade: 25 - 29.9 - overweight  Weight Loss: unintentional  Usual Body Weight (UBW), k kg (Oct 2017)  % Usual Body Weight: 73.66  % Weight Change From Usual Weight: -26.49 %       Lab/Procedures/Meds    Pertinent Labs Reviewed: reviewed  Pertinent Labs Comments: GFR 19; LFT's elevated; Na/CL elevated; BUN/Cr elevated.  Pertinent Medications Reviewed: reviewed  Pertinent Medications Comments: abx, IVF    Physical Findings/Assessment    Overall Physical Appearance: loss of muscle mass, weak  Oral/Mouth Cavity: tooth/teeth missing  Skin: " intact    Estimated/Assessed Needs    Weight Used For Calorie Calculations: 56.6 kg (124 lb 12.5 oz)  Energy Calorie Requirements (kcal): 1700 kcal  Energy Need Method: Kcal/kg  Protein Requirements: 65g  Weight Used For Protein Calculations: 56.5 kg (124 lb 9 oz)     Fluid Need Method: RDA Method  RDA Method (mL): 1700  CHO Requirement: 215g      Nutrition Prescription Ordered    Current Diet Order: CL    Evaluation of Received Nutrient/Fluid Intake    IV Fluid (mL): 100 (ml/hr)  I/O: 1913/1430  Energy Calories Required: not meeting needs  Protein Required: not meeting needs  Fluid Required: meeting needs  % Meal Intake: Other: Diet just advanced.    Nutrition Risk    Level of Risk/Frequency of Follow-up:  (2 x week)     Assessment and Plan    Nutrition Dx; Inadequate energy intake r/t AMS as evidenced by: CL diet per SLP  Nutrition Dx Status: new       Monitor and Evaluation    Food and Nutrient Intake: energy intake, food and beverage intake  Food and Nutrient Adminstration: diet order, enteral and parenteral nutrition administration  Knowledge/Beliefs/Attitudes: food and nutrition knowledge/skill  Physical Activity and Function: nutrition-related ADLs and IADLs  Anthropometric Measurements: weight, weight change  Biochemical Data, Medical Tests and Procedures: electrolyte and renal panel, glucose/endocrine profile  Nutrition-Focused Physical Findings: overall appearance     Nutrition Follow-Up    RD Follow-up?: Yes

## 2018-07-10 NOTE — PLAN OF CARE
Recommendations     Recommendation/Intervention:   1. Boost breeze tid with CL diet   2. Advance diet as able per SLP to 2000 ADA; cardiac   3. RD to monitor progress and reassess need for nutrition support if warranted     Goals: Meet >85% EEN  Nutrition Goal Status: new  Communication of RD Recs: reviewed with RN     D/C planning: Too soon to determine

## 2018-07-11 VITALS
WEIGHT: 124.75 LBS | OXYGEN SATURATION: 99 % | DIASTOLIC BLOOD PRESSURE: 64 MMHG | HEIGHT: 58 IN | HEART RATE: 70 BPM | BODY MASS INDEX: 26.19 KG/M2 | RESPIRATION RATE: 19 BRPM | SYSTOLIC BLOOD PRESSURE: 141 MMHG | TEMPERATURE: 98 F

## 2018-07-11 LAB
ANION GAP SERPL CALC-SCNC: 9 MMOL/L
BACTERIA UR CULT: NORMAL
BUN SERPL-MCNC: 82 MG/DL
CALCIUM SERPL-MCNC: 8.7 MG/DL
CHLORIDE SERPL-SCNC: 122 MMOL/L
CO2 SERPL-SCNC: 19 MMOL/L
CREAT SERPL-MCNC: 2.8 MG/DL
EST. GFR  (AFRICAN AMERICAN): 20 ML/MIN/1.73 M^2
EST. GFR  (NON AFRICAN AMERICAN): 17 ML/MIN/1.73 M^2
GLUCOSE SERPL-MCNC: 202 MG/DL
POCT GLUCOSE: 202 MG/DL (ref 70–110)
POCT GLUCOSE: 280 MG/DL (ref 70–110)
POTASSIUM SERPL-SCNC: 5.3 MMOL/L
SODIUM SERPL-SCNC: 150 MMOL/L

## 2018-07-11 PROCEDURE — 92507 TX SP LANG VOICE COMM INDIV: CPT

## 2018-07-11 PROCEDURE — 63600175 PHARM REV CODE 636 W HCPCS: Performed by: STUDENT IN AN ORGANIZED HEALTH CARE EDUCATION/TRAINING PROGRAM

## 2018-07-11 PROCEDURE — G8998 SWALLOW D/C STATUS: HCPCS | Mod: CJ

## 2018-07-11 PROCEDURE — 80048 BASIC METABOLIC PNL TOTAL CA: CPT

## 2018-07-11 PROCEDURE — 94761 N-INVAS EAR/PLS OXIMETRY MLT: CPT

## 2018-07-11 PROCEDURE — 92526 ORAL FUNCTION THERAPY: CPT

## 2018-07-11 PROCEDURE — 63600175 PHARM REV CODE 636 W HCPCS: Performed by: INTERNAL MEDICINE

## 2018-07-11 PROCEDURE — G8997 SWALLOW GOAL STATUS: HCPCS | Mod: CJ

## 2018-07-11 PROCEDURE — 36415 COLL VENOUS BLD VENIPUNCTURE: CPT

## 2018-07-11 PROCEDURE — 25000003 PHARM REV CODE 250: Performed by: STUDENT IN AN ORGANIZED HEALTH CARE EDUCATION/TRAINING PROGRAM

## 2018-07-11 RX ADMIN — PIPERACILLIN AND TAZOBACTAM 4.5 G: 4; .5 INJECTION, POWDER, LYOPHILIZED, FOR SOLUTION INTRAVENOUS; PARENTERAL at 05:07

## 2018-07-11 RX ADMIN — INSULIN ASPART 2 UNITS: 100 INJECTION, SOLUTION INTRAVENOUS; SUBCUTANEOUS at 05:07

## 2018-07-11 RX ADMIN — ASPIRIN 81 MG: 81 TABLET, COATED ORAL at 08:07

## 2018-07-11 RX ADMIN — HEPARIN SODIUM 5000 UNITS: 5000 INJECTION, SOLUTION INTRAVENOUS; SUBCUTANEOUS at 05:07

## 2018-07-11 RX ADMIN — INSULIN ASPART 6 UNITS: 100 INJECTION, SOLUTION INTRAVENOUS; SUBCUTANEOUS at 11:07

## 2018-07-11 RX ADMIN — ONDANSETRON 4 MG: 2 INJECTION INTRAMUSCULAR; INTRAVENOUS at 05:07

## 2018-07-11 RX ADMIN — AMLODIPINE BESYLATE 10 MG: 5 TABLET ORAL at 08:07

## 2018-07-11 NOTE — PLAN OF CARE
Problem: Patient Care Overview  Goal: Plan of Care Review  Outcome: Ongoing (interventions implemented as appropriate)  Pt AAO, VSS. Pt has periods of flat effect and agitation. Afebrile and on abx. No signs of pressure ulcer. Pt does not move around in bed much on her own. Educated on the need for range of motion and turning while in bed. Blood glucose monitored. Bed in low position. Call light within reach. Will continue to monitor.

## 2018-07-11 NOTE — PLAN OF CARE
Problem: SLP Goal  Goal: SLP Goal  Short Term Goals:  1. Pt will participate in ongoing swallow assessment to determine least restrictive diet.   2. Pt will tolerate clear liquid diet with no audible s/s of dysphagia and good oral clearance.   3. Pt will consume 50%-75% of clear liquid diet without overt s/s of aspiration given min assist.   4. Pt will tolerate advanced trials of solids and pureed with no audible s/s of dysphagia.   5. Pt state orientation info x4 with min cues.   6. Pt will follow complex commands with 90% acc with min A.   7. Pt will complete basic OMEx with min A to increase oral motor coordination/strength.          Outcome: Outcome(s) achieved Date Met: 07/11/18  Pt being discharged to home with hospice.

## 2018-07-11 NOTE — PROGRESS NOTES
[facility] Family at bedside to watch pt depart. Will meet pt at home address. No issues with transfer. All questions answered before pt left.

## 2018-07-11 NOTE — PLAN OF CARE
Problem: Patient Care Overview  Goal: Plan of Care Review  Outcome: Ongoing (interventions implemented as appropriate)  Pt readied for D/C to home hospice. Adequate time for questions provided. Questions answered by TN and RN. IV and ortega taken out. Pt not complaining of any pain throughout shift. VSS. Awaiting transport to home. Grieving support provided.

## 2018-07-11 NOTE — PROGRESS NOTES
Pt's daughter has chosen [facility] and has spoken with Oma at [facility] to sign paperwork ([phone]) . TN visited with pt  and now spouse is at bedside. Tn discussed choice and pt agrees . TN informed team and requested orders; Tn sent referral via [facility] and provided call back # for when Tn can arrange transport home. TN verified addrress with pt's son Coreen who is at bedside.TN  informed Becca RN ICU nurse of above.    1400- Tn received call from Huma at [facility] who informed Tn all orders received and she will notify Tn once DME delivered to house and then Tn can arrange transport . TN sent order for ambulance transportation to [facility] via [facility].

## 2018-07-11 NOTE — PROGRESS NOTES
TN visited with pt and 2 sister in laws who were at bedside. TN was informed pt's daughter ,Taya, has hospice list and has not made a decision on agency. Emotional support provided and all questions answered. TN provided TN and SW spectralink # [id number].

## 2018-07-11 NOTE — PROGRESS NOTES
GENERAL SURGERY  PROGRESS NOTE    Admit Date: 7/9/2018  Hospital Day: 2  Procedure:   None    No acute issues overnight  Still with continued right upper quadrant pain with associated N/V  Patient DNR      Physical Exam:  NAD  NC/AT  Non toxic appearing  No inc WOB  Abd ttp in RUQ with voluntary guarding    Assessment:  67yF with metastatic NET s/p recent TACE p/w sepsis, MOF of unknown origin, transaminitis    Plan:  Continue abx (zosyn)  Continue sandostatin drip  F/u RUQ US results      Inpatient Data      Vitals:   Temp:  [97.4 °F (36.3 °C)-98.1 °F (36.7 °C)]   Pulse:  [61-93]   Resp:  [14-24]   BP: (127-202)/(58-86)   SpO2:  [98 %-100 %]     Diet clear liquid   Intake/Output Summary (Last 24 hours) at 07/11/18 0634  Last data filed at 07/11/18 0600   Gross per 24 hour   Intake          3370.17 ml   Output             1540 ml   Net          1830.17 ml          Recent Labs      07/09/18   1552   07/10/18   0406  07/10/18   1056  07/11/18   0430   WBC  25.68*   --   14.60*   --    --    HGB  15.1   --   10.4*   --    --    HCT  45.6   --   32.2*   --    --    PLT  266   --   186   --    --    NA  154*   < >  151*  150*  150*   K  5.4*   < >  4.8  4.4  5.3*   CL  118*   < >  123*  119*  122*   CO2  21*   < >  20*  22*  19*   BUN  127*   < >  108*  96*  82*   CREATININE  3.7*   < >  2.9*  2.9*  2.8*   BILITOT  2.8*   --   2.0*   --    --    AST  63*   --   40   --    --    ALT  241*   --   144*   --    --    ALKPHOS  293*   --   183*   --    --    CALCIUM  9.5   < >  8.6*  9.3  8.7   ALBUMIN  2.9*   --   2.0*   --    --    PROT  6.5   --   5.0*   --    --     < > = values in this interval not displayed.     Scheduled Meds:   amLODIPine 10 mg Daily   aspirin 81 mg Daily   heparin (porcine) 5,000 Units Q8H   piperacillin-tazobactam (ZOSYN) IVPB 4.5 g Q12H      lactated ringers 100 mL/hr at 07/10/18 2131    octreotide infusion (50 mcg/mL) 250 mcg/hr (07/10/18 1348)       Les Dial MD  General Surgery  PGY-4  7/11/2018  6:34 AM

## 2018-07-11 NOTE — PROGRESS NOTES
[employer] renal fellow [facility]    Progress Note    Reason for Consult:     BREONNA    Subjective:      History of Present Illness:  Lala Ames is a 67 y.o. 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 The patient presented to the [facility] on 7/9/2018 with a primary complaint of Fatigue (pt had a femoral artery angiogram procedure on 6/29. Has been lethargic since then. Called Dr. Dorantes and was told to come to the ER. Family member reports pt was hyperglycemic last night, but that it improved today)    Pt presents to ED per family 2/2 1.5weeks of AMS  Pt has a PMHx sig for metastatic neuroendocrine tumor to liver and lungs; in 5/2018, heme/Onc notes documented that there were limited treatment options left for pt to explore - her case was reviewed at multi-disciplinary tumor conference and it was decided upon to attempt TACE for non-resectable liver mets.    Pt underwent TACE procedure on 6/29/18 and was discharged 7/1/18; per daughter, she was discharged with BP in 200's systolic; since being discharged, the pt was more somnolent and sleeping more which daughter initially thought was 2/2 pain meds; she had decreased po intake; her daughter states that she was confused from the second day post hospital discharge until admit here; pt also had elevated blood sugars approx 1-2 days post hospital discharge; she had intermittent fevers; there was no Hx of loose stool/dysuria/productive cough    Day  "PTA, the pt had elevated BGs and her daughter decided to give some insulin which the pt had recently been taken off 2/2 normal blood glucoses; daughter states the pt's sugars this AM after giving insulin was 170's and daughter felt pt was slightly more coherent.    Renal Hx  -per EMR, th ept appears to have some baseline CKD since 2017; she does not have an outpt nephrologist    Interim History:  Pt requesting that pillow be placed under her buttock. No other complaints.    Review of Systems:  All other systems are reviewed and are negative.       Objective:   Last 24 Hour Vital Signs:  BP  Min: 127/62  Max: 202/79  Temp  Av.7 °F (36.5 °C)  Min: 97.4 °F (36.3 °C)  Max: 98.1 °F (36.7 °C)  Pulse  Av  Min: 62  Max: 93  Resp  Av.6  Min: 14  Max: 24  SpO2  Av.8 %  Min: 98 %  Max: 100 %  Height  Av' 10" (147.3 cm)  Min: 4' 10" (147.3 cm)  Max: 4' 10" (147.3 cm)  Weight  Av.6 kg (124 lb 12.5 oz)  Min: 56.6 kg (124 lb 12.5 oz)  Max: 56.6 kg (124 lb 12.5 oz)  I/O last 3 completed shifts:  In: 5188.5 [P.O.:300; I.V.:4488.5; IV Piggyback:400]  Out: 3005 [Urine:3005]    Physical Examination:  GEN - NAD, oriented x4; somnolent  HEAD - NCAT   ORAL - dry mucus membranes  EYES - PERRLA, non icteric   NECK - neck veins flat; no thyromegaly   CHEST - lungs CTA bilaterally; equal expansion   HEART - RRR, no m/r/s3/s4   ABD - slight distention with decreased BS, non ttp; no guarding or rigidity   EXTR  -warm; no edema  Skin - no bruising; no ecchymosis   Neuro - no asterixis or localizing deficits although pt not completely cooperative with exam 2/2 lethargy      Laboratory Results:    Trended Lab Data:    Recent Labs  Lab 18  1552  07/10/18  0406 07/10/18  1056 18  0430   WBC 25.68*  --  14.60*  --   --    HGB 15.1  --  10.4*  --   --    HCT 45.6  --  32.2*  --   --      --  186  --   --    MCV 86  --  86  --   --    RDW 14.6*  --  14.5  --   --    *  < > 151* 150* 150*   K 5.4*  " < > 4.8 4.4 5.3*   *  < > 123* 119* 122*   CO2 21*  < > 20* 22* 19*   *  < > 108* 96* 82*   GLU 62*  < > 243* 217* 202*   PROT 6.5  --  5.0*  --   --    ALBUMIN 2.9*  --  2.0*  --   --    BILITOT 2.8*  --  2.0*  --   --    AST 63*  --  40  --   --    ALKPHOS 293*  --  183*  --   --    *  --  144*  --   --    < > = values in this interval not displayed.    Trended Cardiac Data:    Recent Labs  Lab 07/09/18  1552   TROPONINI 0.072*           Other Results:  EKG (my interpretation): NSR; LAD; TWI I/aVL - no changes from 6/29/18    Radiology:  Ct Head Without Contrast    Result Date: 7/9/2018  EXAMINATION: CT HEAD WITHOUT CONTRAST CLINICAL HISTORY: Confusion/delirium, altered LOC, unexplained; TECHNIQUE: Low dose axial CT images obtained throughout the head without intravenous contrast. Sagittal and coronal reconstructions were performed. COMPARISON: None. FINDINGS: Intracranial compartment: Mild generalized cerebral volume loss.  The ventricles are midline without distortion by mass effect or evidence of acute hydrocephalus.  No extra-axial blood or fluid collections. There is an area of hypoattenuation without volume loss within the anterior limb of the left internal capsule which also extends to the left caudate and left subinsular region and anterior aspect of the left lenticular nucleus concerning for acute/subacute infarct.  No parenchymal mass, hemorrhage, edema or major vascular distribution infarct. Skull/extracranial contents (limited evaluation): No fracture. Mastoid air cells and paranasal sinuses are essentially clear.  Visualized portions of the orbits are within normal limits.     Left basal ganglia (corpus stratum) suspected acute/subacute infarct. No intracranial hemorrhage or acute large vascular territory cortical infarct. This report was flagged in Epic as abnormal. Electronically signed by: Jose Enrique Richard MD Date:    07/09/2018 Time:    17:38    Ir Embolization For Tumor_organ  Ischemia    Result Date: 6/29/2018  EXAMINATION: Hepatic transarterial chemoembolization - Conventional Procedural Personnel Attending physician(s): Florencio Madden MD Fellow physician(s): None Resident physician(s): None Advanced practice provider(s): None Pre-procedure diagnosis: Other secondary neuroendocrine tumors Post-procedure diagnosis: Same Indication: Locoregional therapy for tumor control Additional clinical history: None Complications: No immediate complications. PROCEDURAL SUMMARY: Arterial access with ultrasound guidance Aortography: None Visceral angiography: Celiac angiography Selective hepatic angiography: Not performed Superselective hepatic angiography: Performed as described below Embolization and post-embolization angiography as described below Additional procedures: None PROCEDURE: Pre-procedure: Consent: Informed consent for the procedure was obtained and time-out was performed prior to the procedure. Preparation: The site was prepared and draped using maximal sterile barrier technique including cutaneous antisepsis. Antibiotic administered: Prophylactic dose within 1 hour of procedure start time or 2 hours for vancomycin or fluoroquinolones. Anesthesia/sedation: Level of anesthesia/sedation: Moderate sedation (conscious sedation) Anesthesia/sedation administered by: Independent trained observer under attending supervision with continuous monitoring of the patient's level of consciousness and physiologic status Total intra-service sedation time (minutes): 60 Access: Local anesthesia was administered. The vessel was sonographically evaluated and judged to be patent. Real time ultrasound was used to visualize needle entry into the vessel and a permanent image was stored. A 5 Vietnamese sheath was placed. Vessel accessed: Right common femoral artery Access technique: Micropuncture set with 21 gauge needle Angiography: The hepatic arterial system was catheterized using Motarjame and progreat catheters.  Variant anatomy: The origin of the left gastric artery cannot be visualized from the celiac axis.  A prominent inferior pancreaticoduodenal arteries identified originating from the celiac axis. Vessel catheterized: Celiac artery DSA images demonstrate nonvisualization of the left gastric artery origin and a prominent inferior pancreaticoduodenal artery.  No stenosis.  Delayed portal venogram demonstrates patent portal vein with hepatopetal flow. Vessel catheterized: Right hepatic artery DSA images demonstrate no hypervascular tumor blush.  However, when correlating with prior MRI, innumerable lesions are seen replace in the majority the right liver lobe.  This was selected was adequate site for transarterial chemoembolization. Drug-eluting bead chemoembolization: Catheter position for embolization #1: Right hepatic artery Embolic administered from this position: 1 vial of 100-300 micron LC beads loaded with 50 mg of doxorubicin Additional embolic administered from this position: None Angiographic endpoint: Slowed flow (contrast visible for fewer than 5 heartbeats) Completion angiography: Catheter position: Right hepatic artery DSA images demonstrate sluggish flow within embolized vessels Closure: Access site angiography performed: Yes Patent vessel with appropriate access level Arterial closure technique: Exoseal Hemostasis achieved from closure technique: Partial Duration of manual compression (minutes): 10 Contrast: Contrast agent: Omnipaque 350 Contrast volume (mL): 55 Radiation Dose: Fluoroscopy time ( minutes): 7.8 Reference air kerma ( mGy ): 98 Kerma area product ( cGy-m2 ): 3031 Additional Details: Additional description of procedure: None Equipment details: None Specimens removed: None Estimated blood loss (mL): Less than 10 Standardized report: [id number]     Hepatic angiography demonstrates no definite evidence for hypervascular tumors.  Transarterial chemoembolization of the right hepatic  artery as described above. Plan: Patient will return in approximately 1 month with repeat MRI to assess response to therapy and need for additional treatment. Attestation: Signer name: Florencio Madden MD I attest that I was present for the entire procedure. I reviewed the stored images and agree with the report as written. Electronically signed by: Florencio Madden MD Date:    06/29/2018 Time:    13:18    X-ray Chest Ap Portable    Result Date: 7/9/2018  EXAMINATION: XR CHEST AP PORTABLE CLINICAL HISTORY: Epigastric pain TECHNIQUE: Single frontal view of the chest was performed. COMPARISON: Chest 09/19/2017 FINDINGS: There is a poor inspiratory effort when compared to the previous study.  No acute lobar consolidations or pneumothorax or pulmonary vascular congestion or pleural effusions.  Heart size is within normal limits.  There is faint atherosclerotic changes of the aortic arch.  There are spondylotic changes in the dorsal spine.     1. No acute intrathoracic processes. Electronically signed by: Darwin Ashford MD Date:    07/09/2018 Time:    14:56    Ct Abdomen Pelvis  Without Contrast    Result Date: 7/9/2018  EXAMINATION: CT ABDOMEN PELVIS WITHOUT CONTRAST CLINICAL HISTORY: abdominal pain; TECHNIQUE: Low dose axial images, sagittal and coronal reformations were obtained from the lung bases to the pubic symphysis.  Oral contrast was not administered. COMPARISON: Multiple prior CT and abdominal MRI, last CT dated 04/30/2018. FINDINGS: The visualized portion of the heart is unremarkable.  There is minimal right basilar atelectasis. Liver is enlarged measuring 24 cm with innumerable hypodense hepatic lesions seen consistent with known history of metastatic disease in this patient with history of neuroendocrine tumor.  There is no intra-or extrahepatic biliary ductal dilatation.  High density material, presumable sludge is seen within the gallbladder.  The stomach, pancreas, spleen, and right adrenal gland are  unremarkable.  There is stable left adrenal thickening. Single punctate nonobstructing stone is seen within the right kidney.  No left renal stones.  No evidence of hydronephrosis.  Ureters are difficult to track, however no definite stones are seen along their expected courses.  Urinary bladder is unremarkable.  Uterus has been removed. Postsurgical changes are seen consistent with partial bowel resection.  The visualized loops of small and large bowel show no evidence of obstruction or inflammation.  Appendix is visualized and is unremarkable.  No free air or free fluid. Aorta tapers normally. No acute osseous abnormality identified. Subcutaneous soft tissue structures are unremarkable.     1. Hepatomegaly with known diffuse hepatic metastatic disease. 2. Otherwise no acute intra-abdominal abnormalities identified. 3. High density material within the gallbladder, presumed biliary sludge. 4. Punctate nonobstructing right renal stone. 5. Postsurgical changes and additional findings as detailed above. Electronically signed by: Renetta Clarke MD Date:    07/09/2018 Time:    20:47         Assessment/Plan       This is a 68 yo AA female who was admitted for AMS; L-renal consulted for    BREONNA stage I  -etiol include pre-renal ATN from contrast/decreased po intake; ATN from infection; urine Na <20; Ct pelvis without reversible causes of BREONNA  -stopped LR. Pt to drink as she desires.     AGMA  -etiol likely combo lactic acidosis + renal failure   -resolved    Acute/subacute stroke  -MRI pending    Sepsis  -agree with broad spectrum Abx for now; rec random vanc levels before empiric dosing  -repeat lactic acid; BCx/UCx pending    Acute liver injury  -etiol likely 2/2 recent TACE procedure    Hypernatremia  -free water deficit approx 2L  -As above    Metastatic neuroendocrine tumor  -poor prognosis as pt is limited in therapy per heme/onc notes    Electrolytes  -hyperNa, hyperkalemia, hyperchloremia. Stopped LR. Pt  dehydrated.    Palliative care consulted and pt is now DNR. Would not pursue dialysis. D/C IV resusitation and allow pt to drink and eat as tolerated. Will sign off at this time. Please call with any further questions.    Tadeo Blas MD, 7/11/2018 9:19 AM  [facility] Nephrology PGY-V  Pager: [phone]  Cell: [phone]

## 2018-07-11 NOTE — PLAN OF CARE
[facility]    FACILITY TRANSFER ORDERS      Patient Name: Lala Ames  YOB: 1950    PCP: Nidia Melendez NP   PCP Address: [facility] [address] [place] [address]  PCP Phone Number: [phone]  PCP Fax: [phone]    Encounter Date: 07/11/2018    Admit to: [facility]    Vital Signs:  Routine    Diagnoses:   Active Hospital Problems    Diagnosis  POA    *Sepsis with multi-organ dysfunction [A41.9, R65.20]  Yes    Palliative care encounter [Z51.5]  Not Applicable    Goals of care, counseling/discussion [Z71.89]  Not Applicable    Counseling regarding advanced care planning and goals of care [Z71.89]  Not Applicable    Moderate malnutrition [E44.0]  Yes     Malnutrition in the context of Chronic Illness/Injury    Related to (etiology):  Cancer    Signs and Symptoms (as evidenced by):    Muscle Mass Depletion: moderate depletion of clavicle, upper arm area, patellar region.   Weight Loss: 26% x 9 months    Interventions/Recommendations (treatment strategy):  Boost breeze with CL diet  Advance diet per SLP to 2000 ADA/2 gm sodium  RD to monitor progress and reassess need for nutrition support    Nutrition Diagnosis Status:  New      Abdominal pain [R10.9]  Unknown    Leukocytosis [D72.829]  Unknown    BREONNA (acute kidney injury) [N17.9]  Unknown    Transaminitis [R74.0]  Unknown    Cerebrovascular accident (CVA) [I63.9]  Yes      Resolved Hospital Problems    Diagnosis Date Resolved POA   No resolved problems to display.       Allergies:  Review of patient's allergies indicates:   Allergen Reactions    Epinephrine     Diltiazem Rash and Other (See Comments)     Skin peels    Morphine Rash       Diet: regular diet    Activities: Activity as tolerated    Nursing: none     Labs: none       CONSULTS:    none    MISCELLANEOUS CARE:  As determined by home hospice MD    WOUND CARE ORDERS  none    Medications: Review discharge medications with patient and  family and provide education.      Current Discharge Medication List      CONTINUE these medications which have NOT CHANGED    Details   amLODIPine (NORVASC) 10 MG tablet Take 1 tablet (10 mg total) by mouth once daily.  Qty: 90 tablet, Refills: 3      aspirin (ECOTRIN) 81 MG EC tablet Take 1 tablet (81 mg total) by mouth once daily.      ergocalciferol (ERGOCALCIFEROL) 50,000 unit Cap Take 1 capsule (50,000 Units total) by mouth every 7 days.  Qty: 12 capsule, Refills: 0      blood sugar diagnostic (BLOOD GLUCOSE TEST) Strp 1 strip by Misc.(Non-Drug; Combo Route) route 3 (three) times daily as needed.  Qty: 300 strip, Refills: 3    Associated Diagnoses: DM (diabetes mellitus), type 2, uncontrolled, with renal complications      blood-glucose meter (FREESTYLE SYSTEM KIT) kit 1 each by Other route 2 (two) times daily. Use as instructed  Qty: 1 each, Refills: 0    Associated Diagnoses: Uncontrolled type 2 diabetes mellitus without complication, without long-term current use of insulin      lancets (ONETOUCH ULTRASOFT LANCETS) Misc 1 each by Misc.(Non-Drug; Combo Route) route 2 (two) times daily as needed.  Qty: 100 each, Refills: 5    Associated Diagnoses: Uncontrolled type 2 diabetes mellitus without complication, without long-term current use of insulin      nitroGLYCERIN (NITROSTAT) 0.4 MG SL tablet Place 1 tablet (0.4 mg total) under the tongue every 5 (five) minutes as needed for Chest pain.  Qty: 30 tablet, Refills: 11      ondansetron (ZOFRAN) 4 MG tablet Take 1 tablet (4 mg total) by mouth every 6 (six) hours as needed for Nausea.  Qty: 20 tablet, Refills: 2      ondansetron (ZOFRAN-ODT) 4 MG TbDL Dissolve 1 tablet (4 mg total) by mouth every 6 (six) hours as needed.  Qty: 30 tablet, Refills: 0      !! oxyCODONE (ROXICODONE) 10 mg Tab immediate release tablet Take 1 tablet (10 mg total) by mouth every 3 (three) hours as needed for Pain.  Qty: 40 tablet, Refills: 0      !! oxyCODONE (ROXICODONE) 5 MG immediate  release tablet Take 1 tablet (5 mg total) by mouth every 6 (six) hours as needed for Pain.  Qty: 20 tablet, Refills: 0      promethazine (PHENERGAN) 12.5 MG Supp Place 1 suppository (12.5 mg total) rectally every 6 (six) hours as needed.  Qty: 60 suppository, Refills: 0    Associated Diagnoses: Neuroendocrine carcinoma, unknown primary site; Secondary neuroendocrine tumor of liver      ranolazine (RANEXA) 500 MG Tb12 Take 1 tablet (500 mg total) by mouth 2 (two) times daily.  Qty: 60 tablet, Refills: 11       !! - Potential duplicate medications found. Please discuss with provider.      STOP taking these medications       amoxicillin-pot clavulanate 250-62.5 mg/5ml (AUGMENTIN) 250-62.5 mg/5 mL suspension Comments:   Reason for Stopping:                    _________________________________  Luis Rosas MD  07/11/2018

## 2018-07-11 NOTE — PROGRESS NOTES
[facility]  Neurology Consult        Patient Name: Lala Ames  MRN: [id number]   Admission Date: 7/9/2018  Principal Problem:Sepsis with multi-organ dysfunction     Subjective:     Overnight: No acute events  Patient was examined at bedside this morning. Patient was response to verbal and non-verbal stimuli  Patient was cooperative despite poor affect.  Patient has no new complaints. Denies any fever, chills, n/v, HA, vision changes chest pain, SOB, abdominal pain or urinary and fecal incontinence.              Past Medical History:   Diagnosis Date    Anticoagulant long-term use      Arthritis      Blood transfusion      Coronary artery disease      Diabetes mellitus      Diabetes mellitus, type 2      Diabetic retinopathy      Disorder of kidney and ureter      H/O coronary angiogram      Hypertension      Liver cancer      Lung metastases 12/5/2017    MI (myocardial infarction)       x3    Nausea 11/21/2017    Neuroendocrine carcinoma, 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    PDR (proliferative diabetic retinopathy)      Secondary malignant carcinoid tumor of liver      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               Past Surgical History:   Procedure Laterality Date    ABDOMINAL SURGERY         bowel had to be repaired after hysterectomy    CARDIAC CATHETERIZATION        COLECTOMY         r/t bowel injury from hysterectomy    COLONOSCOPY        CYST REMOVAL         soft tissue on forehead    EMBOLIZATION N/A 6/29/2018     Procedure: Embolization;  Surgeon: Dheeraj Diagnostic Provider;  Location: [facility] OR [address] [place] [address];  Service: General;  Laterality: N/A;    EYE SURGERY        HERNIA REPAIR         x2    HYSTERECTOMY        LIVER BIOPSY   07/2017               Review of patient's allergies indicates:   Allergen Reactions    Epinephrine      Diltiazem Rash and Other (See Comments)       Skin peels    Morphine Rash         Current Neurological  Medications:          Current Facility-Administered Medications on File Prior to Encounter   Medication    octreotide (SANDOSTATIN) 500 mcg in sodium chloride 0.9% 50 mL IVPB           Current Outpatient Prescriptions on File Prior to Encounter   Medication Sig    amLODIPine (NORVASC) 10 MG tablet Take 1 tablet (10 mg total) by mouth once daily.    amoxicillin-pot clavulanate 250-62.5 mg/5ml (AUGMENTIN) 250-62.5 mg/5 mL suspension Take 10 mLs (500 mg total) by mouth 2 (two) times daily.    aspirin (ECOTRIN) 81 MG EC tablet Take 1 tablet (81 mg total) by mouth once daily.    ergocalciferol (ERGOCALCIFEROL) 50,000 unit Cap Take 1 capsule (50,000 Units total) by mouth every 7 days.    blood sugar diagnostic (BLOOD GLUCOSE TEST) Strp 1 strip by Misc.(Non-Drug; Combo Route) route 3 (three) times daily as needed.    blood-glucose meter (FREESTYLE SYSTEM KIT) kit 1 each by Other route 2 (two) times daily. Use as instructed    lancets (ONETOUCH ULTRASOFT LANCETS) Misc 1 each by Misc.(Non-Drug; Combo Route) route 2 (two) times daily as needed.    nitroGLYCERIN (NITROSTAT) 0.4 MG SL tablet Place 1 tablet (0.4 mg total) under the tongue every 5 (five) minutes as needed for Chest pain.    ondansetron (ZOFRAN) 4 MG tablet Take 1 tablet (4 mg total) by mouth every 6 (six) hours as needed for Nausea.    ondansetron (ZOFRAN-ODT) 4 MG TbDL Dissolve 1 tablet (4 mg total) by mouth every 6 (six) hours as needed.    oxyCODONE (ROXICODONE) 10 mg Tab immediate release tablet Take 1 tablet (10 mg total) by mouth every 3 (three) hours as needed for Pain.    oxyCODONE (ROXICODONE) 5 MG immediate release tablet Take 1 tablet (5 mg total) by mouth every 6 (six) hours as needed for Pain.    promethazine (PHENERGAN) 12.5 MG Supp Place 1 suppository (12.5 mg total) rectally every 6 (six) hours as needed.    ranolazine (RANEXA) 500 MG Tb12 Take 1 tablet (500 mg total) by mouth 2 (two) times daily.      Family History      Problem  Relation (Age of Onset)     Cancer Father, Sister, Maternal Aunt, Maternal Aunt     Diabetes Father, Sister, Daughter     Kidney disease Brother     Mental illness Mother     No Known Problems Daughter, Son, Son, Brother, Brother, Brother     Stomach cancer Sister                 Social History Main Topics    Smoking status: Never Smoker    Smokeless tobacco: Never Used    Alcohol use No         Comment: Occasionally    Drug use: No    Sexual activity: Not Currently       Birth control/ protection: Surgical      Review of Systems   All other systems reviewed and are negative.   as per HPI   Objective:      Vitals:    18 0710 18 0711 18 0800 18 0900   BP:   139/63 (!) 162/72   BP Location:       Patient Position:       Pulse:  64 62 81   Resp:  16 16 (!) 22   Temp: 97.8 °F (36.6 °C)      TempSrc: Oral      SpO2:  100% 100% 100%   Weight:       Height:              Weight: 56.6 kg (124 lb 12.5 oz)  Body mass index is 26.08 kg/m².       NEUROLOGICAL EXAMINATION:     Patient is neurologically intact. No notable changes on physical exam from the day before and are consistent with findings on yesterday's exam.     MENTAL STATUS   Level of consciousness: drowsy ,  arousable by verbal stimuli     MOTOR EXAM   Left arm tone: normal  Right leg tone: normal     Strength   Right biceps: 5/5  Right triceps: 5/5  Left triceps: 5/5  Right wrist extension: 5/5  Left wrist extension: 5/5  Right hamstrin/5  Left hamstrin/5     REFLEXES      Reflexes   Right brachioradialis: 2+  Left brachioradialis: 2+  Right patellar: 2+  Left patellar: 2+  Right achilles: 2+  Left achilles: 2+     SENSORY EXAM   Light touch normal.   Proprioception normal.      GAIT AND COORDINATION        Unable to assess          Significant Labs:     Recent Labs  Lab 18  0430   CALCIUM 8.7   *   K 5.3*   CO2 19*   *   BUN 82*   CREATININE 2.8*     Lab 18  1552 07/10/18  0406   WBC 25.68* 14.60*   HGB  15.1 10.4*   HCT 45.6 32.2*    186           Significant Imaging:  Imaging Results          MRI Brain (Stroke Protocol) Without Contrast (Final result)  Result time 07/10/18 10:12:49    Final result by Teresa Knox MD (07/10/18 10:12:49)                 Impression:      Acute vs. subacute infarct involving the left basal ganglia.  No evidence of significant mass effect or hemorrhagic conversion.      Electronically signed by: Teresa Knox MD  Date:    07/10/2018  Time:    10:12             Narrative:    EXAMINATION:  MRI BRAIN (STROKE PROTOCOL) WITHOUT CONTRAST    CLINICAL HISTORY:  confusion, basal ganglia infarct;    TECHNIQUE:  Multiplanar, multisequence MRI of the brain was performed without intravenous contrast.    COMPARISON:  Prior CT from 07/09/2018    FINDINGS:  There is an area of restricted diffusion centered in the left basal ganglia consistent with acute or subacute infarct.  There is no evidence of hemorrhagic conversion or significant mass effect.  No additional areas of infarction are identified.    The ventricular system is of normal size for age and midline.  Mild confluent areas of increased T2/FLAIR signal are present in a periventricular distribution suggesting mild chronic microvascular ischemic change.  There are no extra-axial masses or fluid collections.    The orbits orbital contents sella turcica and pituitary gland have a normal appearance.  Visualized structures of the craniocervical junction and skull base are unremarkable.                               CT Abdomen Pelvis  Without Contrast (Final result)  Result time 07/09/18 20:47:37    Final result by Renetta Clarke MD (07/09/18 20:47:37)                 Impression:      1. Hepatomegaly with known diffuse hepatic metastatic disease.  2. Otherwise no acute intra-abdominal abnormalities identified.  3. High density material within the gallbladder, presumed biliary sludge.  4. Punctate nonobstructing right renal  stone.  5. Postsurgical changes and additional findings as detailed above.      Electronically signed by: Renetta Clarke MD  Date:    07/09/2018  Time:    20:47             Narrative:    EXAMINATION:  CT ABDOMEN PELVIS WITHOUT CONTRAST    CLINICAL HISTORY:  abdominal pain;    TECHNIQUE:  Low dose axial images, sagittal and coronal reformations were obtained from the lung bases to the pubic symphysis.  Oral contrast was not administered.    COMPARISON:  Multiple prior CT and abdominal MRI, last CT dated 04/30/2018.    FINDINGS:  The visualized portion of the heart is unremarkable.  There is minimal right basilar atelectasis.    Liver is enlarged measuring 24 cm with innumerable hypodense hepatic lesions seen consistent with known history of metastatic disease in this patient with history of neuroendocrine tumor.  There is no intra-or extrahepatic biliary ductal dilatation.  High density material, presumable sludge is seen within the gallbladder.  The stomach, pancreas, spleen, and right adrenal gland are unremarkable.  There is stable left adrenal thickening.    Single punctate nonobstructing stone is seen within the right kidney.  No left renal stones.  No evidence of hydronephrosis.  Ureters are difficult to track, however no definite stones are seen along their expected courses.  Urinary bladder is unremarkable.  Uterus has been removed.    Postsurgical changes are seen consistent with partial bowel resection.  The visualized loops of small and large bowel show no evidence of obstruction or inflammation.  Appendix is visualized and is unremarkable.  No free air or free fluid.    Aorta tapers normally.    No acute osseous abnormality identified. Subcutaneous soft tissue structures are unremarkable.                               CT Head Without Contrast (Final result)     Abnormal  Result time 07/09/18 17:38:58    Final result by Jose Enrique Richard MD (07/09/18 17:38:58)                 Impression:      Left basal  ganglia (corpus stratum) suspected acute/subacute infarct.    No intracranial hemorrhage or acute large vascular territory cortical infarct.    This report was flagged in Epic as abnormal.      Electronically signed by: Jose Enrique Richard MD  Date:    07/09/2018  Time:    17:38             Narrative:    EXAMINATION:  CT HEAD WITHOUT CONTRAST    CLINICAL HISTORY:  Confusion/delirium, altered LOC, unexplained;    TECHNIQUE:  Low dose axial CT images obtained throughout the head without intravenous contrast. Sagittal and coronal reconstructions were performed.    COMPARISON:  None.    FINDINGS:  Intracranial compartment:    Mild generalized cerebral volume loss.  The ventricles are midline without distortion by mass effect or evidence of acute hydrocephalus.  No extra-axial blood or fluid collections.    There is an area of hypoattenuation without volume loss within the anterior limb of the left internal capsule which also extends to the left caudate and left subinsular region and anterior aspect of the left lenticular nucleus concerning for acute/subacute infarct.  No parenchymal mass, hemorrhage, edema or major vascular distribution infarct.    Skull/extracranial contents (limited evaluation): No fracture. Mastoid air cells and paranasal sinuses are essentially clear.  Visualized portions of the orbits are within normal limits.                               X-Ray Chest AP Portable (Final result)  Result time 07/09/18 14:56:06    Final result by Darwin Ashford MD (07/09/18 14:56:06)                 Impression:      1. No acute intrathoracic processes.      Electronically signed by: Darwin Ashford MD  Date:    07/09/2018  Time:    14:56             Narrative:    EXAMINATION:  XR CHEST AP PORTABLE    CLINICAL HISTORY:  Epigastric pain    TECHNIQUE:  Single frontal view of the chest was performed.    COMPARISON:  Chest 09/19/2017    FINDINGS:  There is a poor inspiratory effort when compared to the previous study.  No acute lobar  consolidations or pneumothorax or pulmonary vascular congestion or pleural effusions.  Heart size is within normal limits.  There is faint atherosclerotic changes of the aortic arch.  There are spondylotic changes in the dorsal spine.                              Assessment and Plan:   68 y/o AA female with a pmhx of CAD, DMII with microvascular complications, hypertension and metastatic neuroendrocrine tumor who underwent TACE procedure for unresectable liver mets on 6/29/18 who was found to have acute vs subacute infarct on CT head noncontrast. Patient's presentation is likely 2/2 to elevated blood pressure as basal ganglia is commonly infarcted. No changes on MRI regarding the subacute infarct.      -Continue ASA 81mg and Plavix 75mg PO daily   -Continue Atorvastatin 40mg PO daily  -Patient likely going to hospice in the near future.       -Educated patient on risk factors for stroke including diabetes, hypertension, hyperlipidemia, tobacco smoking  -Educated patient on signs/symptoms of stroke and to call 911 immediately if such occurs                     Active Diagnoses:     Diagnosis Date Noted POA    PRINCIPAL PROBLEM:  Sepsis with multi-organ dysfunction [A41.9, R65.20] 07/09/2018 Yes    Cerebrovascular accident (CVA) [I63.9] 07/09/2018 Yes       Problems Resolved During this Admission:     Diagnosis Date Noted Date Resolved POA               VTE Risk Mitigation          Ordered       heparin (porcine) injection 5,000 Units  Every 8 hours      07/09/18 2128       IP VTE HIGH RISK PATIENT  Once      07/09/18 2128       Place sequential compression device  Until discontinued      07/09/18 2128             Donal Mitchell MD  Family Medicine, PGY-1   [facility]

## 2018-07-11 NOTE — PLAN OF CARE
Problem: Patient Care Overview  Goal: Plan of Care Review  Pt received on RA, no respiratory distress noted. Will cont to monitor.

## 2018-07-11 NOTE — PLAN OF CARE
Call received from Mag with Palliative care.   Family has decided to pursue hospice. TN spoke with Daughter, Taya regarding above. Daughter would like to discuss choices with friends and family.  Information and lists of home hospice and inpatient hospice provided to family. Family to updated TN tomorrow.      Future Appointments  Date Time Provider Department Center   7/30/2018 8:30 AM [facility] OIC-MRI1 [facility] MRI [facility]   8/16/2018 1:30 PM Michaela Modi MD [facility] NEPHRO [facility]   9/19/2018 8:30 AM Yang Child NP [facility] CARDIO [address] [place]   10/18/2018 7:20 AM [facility] LAB [facility] LAB [facility]   10/25/2018 10:40 AM Nidia Melendez NP [facility] FAM MED [facility]   4/23/2019 9:30 AM [facility] XR1 [facility] XRAY [facility]   4/23/2019 10:00 AM UROLOGY CLINIC [facility] UROLOGY [facility]

## 2018-07-11 NOTE — PROGRESS NOTES
"[facility] Medicine Progress Note    Primary Team: [facility] Hospitalist Team B  Attending Physician: Siddharth Howard MD  Resident: Ney Partida DO  Intern: Teddy Givens MD     Subjective:      Lala Ames is a 67 y.o. AA female who  has a past medical history of Anticoagulant long-term use; Arthritis; Blood transfusion; Coronary artery disease; Diabetes mellitus; Diabetes mellitus, type 2; Diabetic retinopathy; Disorder of kidney and ureter; H/O coronary angiogram; Hypertension; Liver cancer; Lung metastases (2017); MI (myocardial infarction); Nausea (2017); Neuroendocrine carcinoma, unknown primary site (2017); PDR (proliferative diabetic retinopathy); Secondary malignant carcinoid tumor of liver; and Secondary neuroendocrine tumor of liver (2017).. The patient presented to [facility] on 2018 with a primary complaint of Fatigue (pt had a femoral artery angiogram procedure on . Has been lethargic since then. Called Dr. Dorantes and was told to come to the ER. Family member reports pt was hyperglycemic last night, but that it improved today).    Overnight, she was in the ICU, but will be moved to hospice in the near future. She continues to endorse pain in her right upper abdomen. She denies N/V/D, chest pain, SOB. She is more alert today. Family has decided on hospice care, hopefully go today.      Objective:     Last 24 Hour Vital Signs:  BP  Min: 127/62  Max: 202/79  Temp  Av.7 °F (36.5 °C)  Min: 97.4 °F (36.3 °C)  Max: 98.1 °F (36.7 °C)  Pulse  Av.2  Min: 61  Max: 93  Resp  Av.5  Min: 14  Max: 24  SpO2  Av.8 %  Min: 98 %  Max: 100 %  Height  Av' 10" (147.3 cm)  Min: 4' 10" (147.3 cm)  Max: 4' 10" (147.3 cm)  Weight  Av.6 kg (124 lb 12.5 oz)  Min: 56.6 kg (124 lb 12.5 oz)  Max: 56.6 kg (124 lb 12.5 oz)  I/O last 3 completed shifts:  In: 3423.5 [I.V.:3123.5; IV Piggyback:300]  Out: 2600 [Urine:2600]    Physical " Examination:  General appearance: alert, appears stated age and cooperative  Neck: no adenopathy, no carotid bruit, no JVD, supple, symmetrical, trachea midline and thyroid not enlarged, symmetric, no tenderness/mass/nodules  Lungs: clear to auscultation bilaterally  Heart: regular rate and rhythm, S1, S2 normal, no murmur, click, rub or gallop  Abdomen: tender to palpation in RUQ, soft, nondistended  Extremities: extremities normal, atraumatic, no cyanosis or edema  Pulses: 2+ and symmetric  Skin: Skin color, texture, turgor normal. No rashes or lesions      Laboratory:  Laboratory Data Reviewed: yes    Microbiology Data Reviewed: yes  Pertinent Findings:  Blood cx: no growth  Urine cx: pending    Other Results:      Radiology Data Reviewed: yes    Current Medications:     Infusions:   lactated ringers 100 mL/hr at 07/10/18 2131    octreotide infusion (50 mcg/mL) 250 mcg/hr (07/10/18 1348)        Scheduled:   amLODIPine  10 mg Oral Daily    aspirin  81 mg Oral Daily    heparin (porcine)  5,000 Units Subcutaneous Q8H    piperacillin-tazobactam (ZOSYN) IVPB  4.5 g Intravenous Q12H        PRN:  dextrose 50%, dextrose 50%, glucagon (human recombinant), glucose, glucose, hydrALAZINE, insulin aspart U-100, ondansetron, sodium chloride 0.9%    Antibiotics and Day Number of Therapy:  Vanc/zosyn: day 2    Lines and Day Number of Therapy:  PIV, ortega    Assessment:     Lala Ames is a 67 y.o.female with  Patient Active Problem List    Diagnosis Date Noted    Palliative care encounter 07/10/2018    Goals of care, counseling/discussion 07/10/2018    Counseling regarding advanced care planning and goals of care 07/10/2018    Moderate malnutrition 07/10/2018    Abdominal pain     Leukocytosis     BREONNA (acute kidney injury)     Transaminitis     Cerebrovascular accident (CVA) 07/09/2018    Sepsis with multi-organ dysfunction 07/09/2018    Neuroendocrine tumor 06/29/2018    Dysphagia 12/14/2017     Gastritis 12/14/2017    Lung metastases 12/05/2017    Neuroendocrine carcinoma, 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    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    Nausea 11/21/2017    History of iron deficiency anemia 10/31/2017    Neuroendocrine cancer 08/07/2017    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 07/28/2017    Nausea 07/26/2017    Hyperkalemia 07/25/2017    Acute pyelonephritis 07/25/2017    Liver mass 07/17/2017    Nephrolithiasis 07/17/2017    Hypertension 07/09/2017    DISH (diffuse idiopathic skeletal hyperostosis) 06/12/2017    Diastolic dysfunction 11/09/2016    Low vitamin D level 11/09/2016    Nephrotic syndrome 11/09/2016    Proliferative diabetic retinopathy associated with type 2 diabetes mellitus 04/01/2016    Uncontrolled diabetes mellitus 03/01/2016    Diabetic macular edema 01/15/2016    Colon cancer screening 05/25/2015    Colon polyp 05/25/2015    Noncompliance with diabetes treatment 11/26/2014    DM (diabetes mellitus), type 2, uncontrolled 11/05/2014    Nuclear sclerosis of both eyes 10/17/2014    History of non-ST elevation myocardial infarction (NSTEMI) 08/29/2014    Stable angina 08/29/2014    Cardiomegaly 08/29/2014    HTN (hypertension) 08/29/2014    MVP (mitral valve prolapse) 08/29/2014    Obesity, Class II, BMI 35-39.9 08/29/2014    CKD (chronic kidney disease) stage 4, GFR 15-29 ml/min 08/29/2014    Vaginal yeast infection 07/25/2014    Non compliance w medication regimen 07/25/2014    H/O hysterectomy for benign disease 07/25/2014    Postmenopausal vaginal bleeding 07/25/2014    Mixed urge and stress incontinence 07/25/2014    Hyperlipidemia 08/20/2013    CAD (coronary artery disease) 08/19/2013        Plan:     Sepis, multiorgan failure:  - WBC 25.68, RR20 on admission  -  BUN/Cr: 108/3.2 (baseline 2.5) on admission  - NA/K: 154/5.1, on admission   - Alk Phos: 293; ALT/AST: 241/63; Lipase 158; TBili: 2.8 on admission    - arrived confused, which since resolved now alert and orientated x3   - admitted to the ICU  - on Zosyn/vanc to cover unknown source  - blood cx 2x and urine cx pending    Stroke vs infarct  - CT revealed basal ganglia stroke older than 12 hours  - will order MRI head in the morning for further evaluation  - heparin drip  - Neuro consult     Transaminitis:  -AST/ALT: 241/63; Tbili: 2.8; Albumin: 2.9; Total Protein: 6.5; Alk phos: 293; Calcium: 9.2, on admission  - pt displays scleral icterus   - pt admitted to ICU  - monitoring labs: today: ALT/AST: 144/40; alk phos: 183  - will check ammonia levels in the morning     BREONNA:  - BUN/Cr: 108/2.9 currently, was 108/3.2 on admission  - Na/K: 151/4.8, on admission: 154/5.1  - decreased PO intake + TACE procedure with contrast  - monitoring labs in the ICU     Hypothyroidism:  - TSH/T4: 0.277/0.69  - will continue to monitor    DMII:  - hypoglycemic overnight, hyperglycemic this morning at 243  - HA1C: 5.6  - PRN insulin ordered      HTN:  - hypertensive in the Unit  - meds pending Neuro consult   - hydralazine 10 mg PRN     DVT: heparin   Diet: NPO  Disposition: poor, ICU for monitoring of multiorgan failure        Teddy Givens MD  [facility]-I    [facility] Medicine Hospitalist Pager numbers:   [facility] Hospitalist Medicine Team A (Dhara/Sudhakar): [phone]  [facility] Hospitalist Medicine Team B (Lee/Jayson):  [phone]

## 2018-07-11 NOTE — PT/OT/SLP PROGRESS
"Speech Language Pathology Treatment  Discharge    Patient Name:  Lala Ames   MRN:  [id number]  Admitting Diagnosis: Sepsis with multi-organ dysfunction    Recommendations:                 General Recommendations:  Follow-up not indicated  Diet recommendations:  Dental Soft, Liquid Diet Level: Thin   Aspiration Precautions: 1 bite/sip at a time, Alternating bites/sips, Eliminate distractions, Feed only when awake/alert, Frequent oral care, Meds whole 1 at a time, Remain upright 30 minutes post meal, Small bites/sips and Standard aspiration precautions   General Precautions: Standard, fall  Communication strategies:  pt speaks softly and encourage her to speak loudly and clear so she can be understood.     Subjective     Pt seen in room for ongoing ST services. Family entered room during mid session.  Patient goals: "I just want something cold."     Pain/Comfort:  · Pain Rating 1:  (pt reports pain but generalized no pain level given)  · Location 1:  (pt is on R side of abdomen)  · Pain Addressed 1: Reposition, Distraction    Objective:     Has the patient been evaluated by SLP for swallowing?   Yes  Keep patient NPO? No   Current Respiratory Status: room air      COGNITION/COMMUNICATION: Pt is alert, oriented to self, place and situation. Pt continues to speak softly and is hard to understand. She tends to mumble and requires careful listening by SLP.   Pt's JOSEPH entered room and was educated on role of SLP and goals for diet advancement.   Pt able to follow commands with matheus CORONADO.     SWALLOWING: Pt presented with the following trials:1 tsp thin liquid apple juice x3 trials, 1 tsp thin liquid water x3 trials, thin liquids cup sip water x3 trials, 1 tsp ice cream x3 trials, 1 tsp apple sauce x8 trials self regulated bites.  Oral phase: Pt presented with mildly reduced labial seal with no anterior spillage, fair A/P transfer characterized by reduced lingual strength and control, fair oral motor control " "characterized by reduced lingual strength and control, mild residue after puree trials, and no bolus holding noted.  Pt demonstrates overall adequate oral phase of swallow.   Pharyngeal phase: Timely triggering of pharyngeal swallow and hyolaryngeal elevation upon finger palpation, clear vocal quality after each presented trial. Pt reports sensation of food "stuck" on right side of throat but peripheral line on right side could be cause of discomfort. Pt instructed to couch to clear throat but a weak cough was noted. SLP notified RN of upgrade to dental soft and thin liquid diet upon discharge.       Assessment:     Lala Ames is a 67 y.o. female admitted with Sepsis with multi-organ dysfunction who presents with dysphagia and poor PO intake. Pt will be discharged home with hospice. REC: dental soft diet and thin liquids at this time to keep her comfortable and for quality of life. RN confirmed DC plan for home with hospice.       Goals:    SLP Goals     Not on file          Multidisciplinary Problems (Resolved)        Problem: SLP Goal    Goal Priority Disciplines Outcome   SLP Goal   (Resolved)     SLP Outcome(s) achieved   Description:  Short Term Goals:  1. Pt will participate in ongoing swallow assessment to determine least restrictive diet.   2. Pt will tolerate clear liquid diet with no audible s/s of dysphagia and good oral clearance.   3. Pt will consume 50%-75% of clear liquid diet without overt s/s of aspiration given min assist.   4. Pt will tolerate advanced trials of solids and pureed with no audible s/s of dysphagia.   5. Pt state orientation info x4 with min cues.   6. Pt will follow complex commands with 90% acc with min A.   7. Pt will complete basic OMEx with min A to increase oral motor coordination/strength.                           Plan:     · Patient to be seen:  3 x/week   · Plan of Care expires:  08/08/18  · Plan of Care reviewed with:  patient (RN)   · SLP Follow-Up:  No  "      Discharge recommendations:   (home with hospice)     Time Tracking:     SLP Treatment Date:   07/11/18  Speech Start Time:  0950  Speech Stop Time:  1010     Speech Total Time (min):  20 min    Billable Minutes: Speech Therapy Individual 8 and Treatment Swallowing Dysfunction 12      VERONICA Collado, CCC-SLP  07/11/2018   I certify that I was present in the room directing the student in service delivery and guiding them using my skilled judgment. As the co-signing therapist I have reviewed the students documentation and am responsible for the treatment, assessment, and plan.   JUNIOR Joel.   SLP  Clinician   7/11/2018

## 2018-07-11 NOTE — PLAN OF CARE
Tn received call from Huma at [facility] that DME delivered and transport can be arranged. Tn received case # [id number] from [employer] @ [facility] for ambulance via [facility]. [place] called [facility] and was informed transport will be here within the hour. Tn notified pt, spouse, and other family members who were in the room and also notified pt's daughter , Taya via phone([phone]).  . Tn gave d/c packet to JAMAR [facility] ICU nurse for transport.       07/11/18 1517   Final Note   Assessment Type Final Discharge Note   Discharge Disposition HospiceHome   What phone number can be called within the next 1-3 days to see how you are doing after discharge? [phone]   Hospital Follow Up  Appt(s) scheduled? No  (hospice MD will follow)   Discharge plans and expectations educations in teach back method with documentation complete? Yes   Right Care Referral Info   Post Acute Recommendation Other   Facility Name [facility]

## 2018-07-13 NOTE — DISCHARGE SUMMARY
[facility] Hospital Medicine Discharge Summary    Primary Team: [facility] Hospitalist Team B  Attending Physician: Dr. Howard  Resident: Jaquan  Intern: Dr. Givens    Date of Admit: 7/9/2018  Date of Discharge: 7/11/2018    Discharge to: [facility]  Condition: Gaurded    Discharge Diagnoses     Patient Active Problem List   Diagnosis    CAD (coronary artery disease)    Hyperlipidemia    Vaginal yeast infection    Non compliance w medication regimen    H/O hysterectomy for benign disease    Postmenopausal vaginal bleeding    Mixed urge and stress incontinence    History of non-ST elevation myocardial infarction (NSTEMI)    Stable angina    Cardiomegaly    HTN (hypertension)    MVP (mitral valve prolapse)    Obesity, Class II, BMI 35-39.9    CKD (chronic kidney disease) stage 4, GFR 15-29 ml/min    Nuclear sclerosis of both eyes    DM (diabetes mellitus), type 2, uncontrolled    Noncompliance with diabetes treatment    Colon cancer screening    Colon polyp    Diabetic macular edema    Uncontrolled diabetes mellitus    Proliferative diabetic retinopathy associated with type 2 diabetes mellitus    Diastolic dysfunction    Low vitamin D level    Nephrotic syndrome    DISH (diffuse idiopathic skeletal hyperostosis)    Liver mass    Hypertension    Nephrolithiasis    Hyperkalemia    Acute pyelonephritis    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    Nausea    Neuroendocrine cancer    History of iron deficiency anemia    Neuroendocrine carcinoma, unknown primary site    Secondary neuroendocrine tumor of liver    Nausea    Lung metastases    Dysphagia    Gastritis    Neuroendocrine tumor    Cerebrovascular accident (CVA)    Sepsis with multi-organ dysfunction    Palliative care encounter    Goals of care, counseling/discussion    Counseling regarding advanced care planning and goals of care    Moderate malnutrition    Abdominal pain    Leukocytosis    BREONNA  (acute kidney injury)    Transaminitis       Consultants and Procedures     Consultants:  [facility]  Neuroendocrine Sx/General Surgery  Neurology  Interventional Radiology  Palliative Care    Procedures:   None    Brief History of Present Illness      Lala Ames is a 67 y.o. 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 The patient presented to [facility] on 7/9/2018 with a primary complaint of Fatigue (pt had a femoral artery angiogram procedure on 6/29. Has been lethargic since then. Called Dr. Dorantes and was told to come to the ER. Family member reports pt was hyperglycemic last night, but that it improved today)  And confusion. She had a TACE procedure done last week, and since then the family has described her as not orientated to place, making inappropriate statements and at times being combative and lethargic at other times. Daughter, who is primary caregiver, stopped oxycodone but pt remained confused. She describes decreased PO intake, decreased urine output. Pt denies nausea, vomiting, fevers, chills, SOB or chest pain.   Pt was brought to the ED for confusion, where she received IV fluids. When I went to see her she was orientated to person, place and time. She was not in any pain, but was generally fatigued and uncomfortable.     For the full HPI please refer to the History & Physical from this admission.    Hospital Course By Problem with Pertinent  Findings     Patient discharged to Home Hospice.    1. Sepsis with Multi-Organ Failure, Encephalopathy  - Presented with fatigue in setting of recent TACE procedure for patient's   - WBC 25, AST 63, , Tbili 2.8, Na 154, K 5.4, Cl 118, , Cr 3.7, anion gap of 15, Lactate 2.8, Procal 2.4, Uric acid 18.8  - Patient severely weak, cultures obtained and placed on broad spectrum antibiotics  - ABG: pH 7.348, pCO2 37, HCO3 20.5 on room air  - [facility] consulted given severe BREONNA, likely pre-renal in etiology with Luiz <20  - Placed on IVF; labs with gradual improvement in sodium, renal function, liver enzymes with aggressive hydration and IV Antibiotics; Lactate 1.2 on repeat  - BCX x2 and UCx both negative to date  - Given severity of condition; palliative care consulted and family made decision to pursue home hospice; discharged on 7/11.    2. Metastatic Disease 2/2 Neuroendocrine Tumor s/p recent TACE  - Patient with metastatic NET burden to liver and lungs. Recently had TACE procedure for her Neuroendocrine tumor with IR June 29, 2018 following multi-disciplinary tumor conference; discharge on 7/1  - Patient overall worsening post procedure with increased somnolence, sleeping more, confusion, and persistently elevated BP  - Patient with consistent symptoms of confusion and elevated BP  - Surgery and IR consulted in this setting; no surgical intervention available and per IR, symptoms not consistent with post TACE procedure  - Given severity of condition; palliative care consulted and family made decision to pursue home hospice; discharged on 7/11.    3. Acute vs. Subacute Infarct Left Basal Ganglia  - Identified on CT Head and MRI brain  - Neurology consulted with recs to initiate treatment and further imaging  - ECHO wnl, MRA deferred given patient's condition and goals of care being discussed  - Patient eventually cleared by speech and initiated on ASA, Plavix, Statin  - Per family, patient's  mentation close to baseline and did not desire further aggressive measures.  - Given severity of condition; palliative care consulted and family made decision to pursue home hospice; discharged on 7/11.    4. Hypertensive Emergency in Setting of Metastatic Neuroendocrine Cancer  - Patient with diffuse metabolic derangement and persistently elevated BP  - Neuroendocrine tumor likely contributing to elevated pressures  - Given patient with limited PO capacity, IV blood pressure control pursued  - Surgery consulted with recommendations to initiate octreotide gtt  - Given severity of condition; palliative care consulted and family made decision to pursue home hospice; discharged on 7/11.    5. BREONNA in setting of CKD  - , Cr 3.7 on admit (baseline ~2)  - Urine studies consistent with pre-renal with Luiz <20 but likely multi-factorial etiology given recent contrast from TACE, recent decreased PO intake, and likely infection given elevated lactate and procal.   - Nephrology consulted and discussed options that include RRT and decision made to forego RRT  - Alvarenga placed and fluids initiated    6. Hypernatremia  - Na elevated at 154 on admit, fluids initiated with gradual improvement in Na.   - Patient and family decided on Home Hospice   - Na 150 on day of discharge      Discharge Medications        Medication List      CONTINUE taking these medications    amLODIPine 10 MG tablet  Commonly known as:  NORVASC  Take 1 tablet (10 mg total) by mouth once daily.     aspirin 81 MG EC tablet  Commonly known as:  ECOTRIN  Take 1 tablet (81 mg total) by mouth once daily.     blood sugar diagnostic Strp  Commonly known as:  BLOOD GLUCOSE TEST  1 strip by Misc.(Non-Drug; Combo Route) route 3 (three) times daily as needed.     blood-glucose meter kit  Commonly known as:  FREESTYLE SYSTEM KIT  1 each by Other route 2 (two) times daily. Use as instructed     ergocalciferol 50,000 unit Cap  Commonly known as:  ERGOCALCIFEROL  Take 1  capsule (50,000 Units total) by mouth every 7 days.     lancets Misc  Commonly known as:  ONETOUCH ULTRASOFT LANCETS  1 each by Misc.(Non-Drug; Combo Route) route 2 (two) times daily as needed.     nitroGLYCERIN 0.4 MG SL tablet  Commonly known as:  NITROSTAT  Place 1 tablet (0.4 mg total) under the tongue every 5 (five) minutes as needed for Chest pain.     ondansetron 4 MG tablet  Commonly known as:  ZOFRAN  Take 1 tablet (4 mg total) by mouth every 6 (six) hours as needed for Nausea.     ondansetron 4 MG Tbdl  Commonly known as:  ZOFRAN-ODT  Dissolve 1 tablet (4 mg total) by mouth every 6 (six) hours as needed.     * oxyCODONE 5 MG immediate release tablet  Commonly known as:  ROXICODONE  Take 1 tablet (5 mg total) by mouth every 6 (six) hours as needed for Pain.     * oxyCODONE 10 mg Tab immediate release tablet  Commonly known as:  ROXICODONE  Take 1 tablet (10 mg total) by mouth every 3 (three) hours as needed for Pain.     promethazine 12.5 MG Supp  Commonly known as:  PHENERGAN  Place 1 suppository (12.5 mg total) rectally every 6 (six) hours as needed.     ranolazine 500 MG Tb12  Commonly known as:  RANEXA  Take 1 tablet (500 mg total) by mouth 2 (two) times daily.        * This list has 2 medication(s) that are the same as other medications prescribed for you. Read the directions carefully, and ask your doctor or other care provider to review them with you.            STOP taking these medications    amoxicillin-pot clavulanate 250-62.5 mg/5ml 250-62.5 mg/5 mL suspension  Commonly known as:  AUGMENTIN            Discharge Information:     Diet: Regular Diet    Physical Activity: As tolerated    Instructions:  Per Home Hospice    Thank you for allowing us to care for Lala Ames.     Ney Partida DO  [facility] Internal Medicine-Pediatrics PGY-IV  [facility] Hospitalist Service Team B

## 2018-07-14 NOTE — PHYSICIAN QUERY
"PT Name: Lala Ames  MR #: [id number]     Physician Query Form - Documentation Clarification      CDS: Solange Whitaker RN, CCDS         Contact information :[phone]  [email or web address]       This form is a permanent document in the medical record.       Query Date: July 14, 2018    By submitting this query, we are merely seeking further clarification of documentation. Please utilize your independent clinical judgment when addressing the question(s) below.    The Medical record reflects the following:    Supporting Clinical Findings Location in Medical Record     "Acute liver injury  -etiol likely 2/2 recent TACE procedure"    Total Bilirubin 2.8,AST 63,      "Placed on IVF; labs with gradual improvement in sodium, renal function, liver enzymes with aggressive hydration and IV Antibiotics; Lactate 1.2 on repeat"    "Metastatic Disease 2/2 Neuroendocrine Tumor s/p recent TACE  - Patient with metastatic NET burden to liver and lungs. Recently had TACE procedure for her Neuroendocrine tumor with IR June 29, 2018 following multi-disciplinary tumor conference; discharge on 7/1"    "Sepsis with Multi-Organ Failure, Encephalopathy  - Presented with fatigue in setting of recent TACE procedure for patient's   - WBC 25, AST 63, , Tbili 2.8, Na 154, K 5.4, Cl 118, , Cr 3.7, anion gap of 15, Lactate 2.8, Procal 2.4, Uric acid 18."   Nephrology consult 7/9/18    Lab 7/9/18      Discharge summary 7/12/18                                                                                Doctor, Please specify diagnosis or diagnoses associated with above clinical findings.  Please clarify diagnosis acute liver injury.    Provider Use Only      ___acute liver failure related to TACE procedure    ___acute liver failure related to metastatic liver disease    ___acute liver failure related to other etiology, please specify, ____    ___other clarification, ______                               "                                                                                 [ X ] Clinically undetermined (patient presented in sepsis with multiorgan failure; recent TACE; cannot definitely say etiology)

## 2018-07-15 LAB
BACTERIA BLD CULT: NORMAL
BACTERIA BLD CULT: NORMAL

## 2018-08-07 ENCOUNTER — TELEPHONE (OUTPATIENT)
Dept: NEUROLOGY | Facility: HOSPITAL | Age: 68
End: 2018-08-07

## 2018-08-07 NOTE — TELEPHONE ENCOUNTER
----- Message from Ovidio Mcguire sent at 8/7/2018  1:29 PM CDT -----  Contact: Hoda- Daughter  Will like a call from [place] in regards to disability paperwork     Contact::[phone]

## 2018-08-09 ENCOUNTER — TELEPHONE (OUTPATIENT)
Dept: NEUROLOGY | Facility: HOSPITAL | Age: 68
End: 2018-08-09

## 2018-08-09 NOTE — TELEPHONE ENCOUNTER
Patient needs a letter stating her disability.  She has adopted her grandchildren after another daughter passed away and she gets survivor benefits for them.  Due to her being in hospice, she needs to get a letter of disability to be able to transfer benefits to her  and eldest child who the secondary caregiver. I told her to use the hospice company as a source because they are in charge of her care now and have social workers that can take care of that, but if they weren't able to help, I will have Dr. Dorantes write a letter of disability.

## 2018-08-09 NOTE — TELEPHONE ENCOUNTER
----- Message from Joanna Feliciano sent at 8/9/2018 10:00 AM CDT -----  Contact: Hoda Rodarte (Daughter)  Daughter would like to speak with someone regarding disability paperwork    Contact number [phone]    Thanks

## 2018-10-10 ENCOUNTER — TELEPHONE (OUTPATIENT)
Dept: ADMINISTRATIVE | Facility: HOSPITAL | Age: 68
End: 2018-10-10

## 2019-05-19 NOTE — ED PROVIDER NOTES
Encounter Date: 1/11/2018    SCRIBE #1 NOTE: I, Leanna Smith, am scribing for, and in the presence of, Dr. Davison.       History     Chief Complaint   Patient presents with    Emesis     States vomits constantly since being diagnosed with carcinoid in May, 2017. Today, unable to hold meds down. When questioned further, patient states that she has been able to tolerate some Ensure. Presents awake, alert, oriented with c/o abdominal and chest pain.        01/11/2018  8:38 PM    Chief Complaint: Emesis      The patient is a 67 y.o. female with PMHx of CAD, HTN, DM, coronary angiogram, and MI who presents with constant emesis since being diagnosed with carcinoid tumor in May 2017. Pt states she needs chemo, but before she could undergo treatment, they discovered she had H. Pylori. She is intolerant of the abx because of her difficulty swallowing. The dysphagia is mechanical and related to weak esophagus muscle and carcinoid of the liver that obstructs swallowing. Pt also endorses intermittent diffuse abd pain, but not at present. Denies fever, diarrhea. PSHx includes hysterectomy, hernia repair, colectomy, colonoscopy, and cardiac catheterization.      The history is provided by the patient.     Review of patient's allergies indicates:   Allergen Reactions    Epinephrine     Diltiazem Rash and Other (See Comments)     Skin peels    Morphine Rash     Past Medical History:   Diagnosis Date    Anticoagulant long-term use     Arthritis     Blood transfusion     Coronary artery disease     Diabetes mellitus     Diabetes mellitus, type 2     Diabetic retinopathy     Disorder of kidney and ureter     H/O coronary angiogram     Hypertension     Liver cancer     Lung metastases 12/5/2017    Malignant carcinoid tumor of pancreas     MI (myocardial infarction)     Nausea 11/21/2017    Neuroendocrine carcinoma, 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 Notified Dr. Sandy Rocha; see new orders. RT called to bring prn breathing tx. Vitals stable as charted. carcinoid tumor of liver     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     Past Surgical History:   Procedure Laterality Date    ABDOMINAL SURGERY      CARDIAC CATHETERIZATION      COLECTOMY      r/t bowel injury from hysterectomy    COLONOSCOPY      CYST REMOVAL      soft tissue on forehead    EYE SURGERY      HERNIA REPAIR      hiatal hernia    HYSTERECTOMY      LIVER BIOPSY  07/2017     Family History   Problem Relation Age of Onset    Mental illness Mother     Diabetes Father     Cancer Father      pancreatic    Diabetes Sister     Stomach cancer Sister     Cancer Sister      gastric    Kidney disease Brother     Diabetes Daughter     No Known Problems Daughter     No Known Problems Son     No Known Problems Son     No Known Problems Brother     No Known Problems Brother     No Known Problems Brother     Cancer Maternal Aunt     Cancer Maternal Aunt      Social History   Substance Use Topics    Smoking status: Never Smoker    Smokeless tobacco: Never Used    Alcohol use No      Comment: Occasionally     Review of Systems   Constitutional: Negative for fever.   HENT: Positive for trouble swallowing.    Eyes: Negative for visual disturbance.   Respiratory: Negative for shortness of breath.    Cardiovascular: Negative for chest pain.   Gastrointestinal: Positive for abdominal pain, nausea and vomiting. Negative for diarrhea.   Genitourinary: Negative for dysuria.   Musculoskeletal: Negative for back pain.   Skin: Negative for wound.   Neurological: Negative for headaches.   Hematological: Does not bruise/bleed easily.       Physical Exam     Initial Vitals [01/11/18 1835]   BP Pulse Resp Temp SpO2   (!) 196/91 88 16 97.6 °F (36.4 °C) 100 %      MAP       126         Physical Exam    Nursing note and vitals reviewed.  Constitutional: She appears well-developed and well-nourished. She is not diaphoretic. No distress.   HENT:   Head: Normocephalic and atraumatic.   Mouth/Throat:  "Oropharynx is clear and moist.   Eyes: Conjunctivae and EOM are normal. Pupils are equal, round, and reactive to light.   Malar "mask".   Neck: Normal range of motion. Neck supple. No Brudzinski's sign and no Kernig's sign noted. Carotid bruit is not present. No JVD present.   Cardiovascular: Normal rate, regular rhythm, normal heart sounds and intact distal pulses. Exam reveals no gallop and no friction rub.    No murmur heard.  No peripheral edema.   Pulmonary/Chest: Breath sounds normal. No respiratory distress. She has no wheezes. She has no rhonchi. She has no rales.   Abdominal: Soft. Bowel sounds are normal. She exhibits no distension and no mass. There is generalized tenderness. There is no rigidity, no rebound, no guarding and no CVA tenderness.   Pannus.   Musculoskeletal: Normal range of motion.   Neurological: She is alert and oriented to person, place, and time.   Skin: Skin is warm. No rash noted. No pallor.   Very dry skin. No varicosities.    Psychiatric: She has a normal mood and affect. Her behavior is normal. Judgment and thought content normal.         ED Course   Procedures  Labs Reviewed   CBC W/ AUTO DIFFERENTIAL - Abnormal; Notable for the following:        Result Value    Hemoglobin 11.6 (*)     Hematocrit 35.5 (*)     Gran% 73.4 (*)     Lymph% 17.8 (*)     All other components within normal limits   URINALYSIS - Abnormal; Notable for the following:     Protein, UA 2+ (*)     All other components within normal limits   COMPREHENSIVE METABOLIC PANEL - Abnormal; Notable for the following:     CO2 20 (*)     BUN, Bld 26 (*)     Creatinine 1.6 (*)     Albumin 3.3 (*)     Alkaline Phosphatase 154 (*)     AST 42 (*)     eGFR if  39 (*)     eGFR if non  34 (*)     All other components within normal limits   LACTIC ACID, PLASMA   URINALYSIS MICROSCOPIC         Imaging Results          X-Ray Abdomen Flat And Erect (Final result)  Result time 01/11/18 20:55:14    Final " result by Peggy Lofton MD (01/11/18 20:55:14)                 Impression:      1.  Nonobstructive bowel gas pattern noting constipation.      Electronically signed by: PEGGY LOFTON MD  Date:     01/11/18  Time:    20:55              Narrative:    Abdomen flat and erect    Clinical history: vomiting    Comparison: MRI 11/24/2017, CT 7/19/2017    Findings:  1 upright view, one supine view.    No significant air fluid levels on upright view.  Air and stool is seen within the large bowel, and projected over the rectum, noting large amount stool throughout the colon, concerning for constipation.  Postsurgical changes are noted of the distal bowel.  No focally dilated small bowel loops.  No large volume free air or pneumatosis.  There are no calcifications to convincingly suggest nephrolithiasis or cholelithiasis.  Lower lung zones are grossly clear.  No acute osseous abnormality.                                 Medical Decision Making:   History:   Old Medical Records: I decided to obtain old medical records.  Clinical Tests:   Lab Tests: Ordered and Reviewed  Radiological Study: Ordered and Reviewed            Scribe Attestation:   Scribe #1: I performed the above scribed service and the documentation accurately describes the services I performed. I attest to the accuracy of the note.            ED Course      Clinical Impression:     1. Vomiting

## 2023-08-08 NOTE — PROGRESS NOTES
[place]:  [facility]  A collaboration between [facility] and [facility]    PATIENT: Lala Ames  MRN: [id number]  DATE: 5/2/2018      Diagnosis:   1. Neuroendocrine carcinoma, unknown primary site    2. Secondary neuroendocrine tumor of liver    3. Malignant neoplasm metastatic to lung, unspecified laterality        Chief Complaint: Carcinoid Tumor      Oncologic History:      Oncologic History Neuroendocrine  tumor unknown primary   Metastatic disease to liver diagnosed 7/2017   Metastatic disease to lung diagnosed 11/2017     Oncologic Treatment Sandostatin 8/2017 - 10/2017  Sutent 8/2017 x 8 weeks (discontinue due to side effects)    Pathology Well differentiated neuroendocrine tumor, Ki-67 5%           Subjective:    Interval History: Ms. Ames is a 67 y.o. female who is seen in follow-up for a neuroendocrine tumor of unknown primary.  I last saw her in 1/2018 and recommended initiation of treatment with Afinitor following clearance of infection.  She has decided that she does not want to take any further chemotherapy.  She states that she does have some intermittent fatigue, shortness of breath, abdominal pain.  She does try to stay active.  She has lost a few pounds since last weighing herself.  No other new complaints.    Her history dates to 7/2017 when she sought medical attention for abdominal pain she does have a history of chronic kidney disease and a noncontrasted CT of the abdomen was performed as well as an ultrasound which had shown  findings consistent with metastatic disease to the liver.  She underwent a liver biopsy with pathology revealing a well-differentiated neuroendocrine tumor with Ki-67 of 5%.  Subsequent MRI was performed which also did not reveal a primary site but did confirm disease in her liver.  She was initially started on Sandostatin in 8/2017 as well as Sutent.  She  took Sutent for approximately 8 weeks but experienced worsening nausea, vomiting, decreased appetite and weight loss and this was discontinued.  At the same time a chromogranin level was measured which had increased since her initial diagnosis.  MRI had revealed substantial disease in the liver and CT of the chest had also revealed the presence of lung metastases.          Past Medical History:   Past Medical History:   Diagnosis Date    Anticoagulant long-term use     Arthritis     Blood transfusion     Coronary artery disease     Diabetes mellitus     Diabetes mellitus, type 2     Diabetic retinopathy     Disorder of kidney and ureter     H/O coronary angiogram     Hypertension     Liver cancer     Lung metastases 12/5/2017    Malignant carcinoid tumor of pancreas     MI (myocardial infarction)     Nausea 11/21/2017    Neuroendocrine carcinoma, 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    PDR (proliferative diabetic retinopathy)     Secondary malignant carcinoid tumor of liver     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       Past Surgical HIstory:   Past Surgical History:   Procedure Laterality Date    ABDOMINAL SURGERY      CARDIAC CATHETERIZATION      COLECTOMY      r/t bowel injury from hysterectomy    COLONOSCOPY      CYST REMOVAL      soft tissue on forehead    EYE SURGERY      HERNIA REPAIR      hiatal hernia    HYSTERECTOMY      LIVER BIOPSY  07/2017       Family History:   Family History   Problem Relation Age of Onset    Mental illness Mother     Diabetes Father     Cancer Father      pancreatic    Diabetes Sister     Stomach cancer Sister     Cancer Sister      gastric    Kidney disease Brother     Diabetes Daughter     No Known Problems Daughter     No Known Problems Son     No Known Problems Son     No Known Problems Brother     No Known Problems Brother     No Known Problems Brother     Cancer Maternal Aunt     Cancer Maternal Aunt        Social History:   reports that she has never smoked. She has never used smokeless tobacco. She reports that she does not drink alcohol or use drugs.    Allergies:  Review of patient's allergies indicates:   Allergen Reactions    Epinephrine     Diltiazem Rash and Other (See Comments)     Skin peels    Morphine Rash       Medications:  Current Outpatient Prescriptions   Medication Sig Dispense Refill    amlodipine (NORVASC) 10 MG tablet Take 1 tablet (10 mg total) by mouth once daily. 90 tablet 3    aspirin (ECOTRIN) 81 MG EC tablet Take 1 tablet (81 mg total) by mouth once daily.      blood sugar diagnostic (BLOOD GLUCOSE TEST) Strp 1 strip by Misc.(Non-Drug; Combo Route) route 3 (three) times daily as needed. 300 strip 3    blood-glucose meter (FREESTYLE SYSTEM KIT) kit 1 each by Other route 2 (two) times daily. Use as instructed 1 each 0    ergocalciferol (ERGOCALCIFEROL) 50,000 unit Cap Take 1 capsule (50,000 Units total) by mouth every 7 days. 12 capsule 0    lancets (ONETOUCH ULTRASOFT LANCETS) Misc 1 each by Misc.(Non-Drug; Combo Route) route 2 (two) times daily as needed. 100 each 5    nitroGLYCERIN (NITROSTAT) 0.4 MG SL tablet Place 1 tablet (0.4 mg total) under the tongue every 5 (five) minutes as needed for Chest pain. 30 tablet 11    everolimus 10 mg Tab Take 1 tablet (10 mg total) by mouth once daily. 30 tablet 6    ondansetron (ZOFRAN) 4 MG tablet Take 1 tablet (4 mg total) by mouth every 6 (six) hours as needed for Nausea. 20 tablet 2    promethazine (PHENERGAN) 12.5 MG Supp Place 1 suppository (12.5 mg total) rectally every 6 (six) hours as needed. 60 suppository 0     No current facility-administered medications for this visit.        Review of Systems   Constitutional: Positive for unexpected weight change. Negative for activity change, appetite change, chills and fever.   HENT: Positive for trouble swallowing. Negative for congestion, hearing loss and nosebleeds.    Eyes: Negative for visual  "disturbance.   Respiratory: Positive for shortness of breath. Negative for cough.    Cardiovascular: Negative for chest pain and palpitations.   Gastrointestinal: Positive for abdominal pain, diarrhea, nausea and vomiting. Negative for blood in stool and constipation.   Genitourinary: Negative for dysuria.   Musculoskeletal: Negative for back pain and gait problem.   Skin: Negative for color change and rash.   Neurological: Negative for dizziness, weakness and headaches.   Hematological: Negative for adenopathy. Does not bruise/bleed easily.   Psychiatric/Behavioral: Negative for confusion.       ECOG Performance Status: 1   Objective:      Vitals:   Vitals:    05/02/18 0954   BP: (!) 181/86   Pulse: 81   Resp: 16   Temp: 98.4 °F (36.9 °C)   SpO2: 99%   Weight: 64.3 kg (141 lb 12.1 oz)   Height: 4' 11" (1.499 m)     BMI: Body mass index is 28.63 kg/m².    Physical Exam   Constitutional: She is oriented to person, place, and time. She appears well-developed and well-nourished. No distress.   HENT:   Head: Normocephalic.   Mouth/Throat: No oropharyngeal exudate.   Eyes: EOM are normal. No scleral icterus.   Neck: Neck supple. No tracheal deviation present. No thyromegaly present.   Cardiovascular: Normal rate and regular rhythm.    Pulmonary/Chest: Effort normal and breath sounds normal. No respiratory distress. She has no wheezes. She has no rales.   Abdominal: Soft. She exhibits no distension and no mass. There is tenderness. There is no rebound and no guarding.   Musculoskeletal: Normal range of motion. She exhibits no edema.   Lymphadenopathy:     She has no cervical adenopathy.   Neurological: She is alert and oriented to person, place, and time. No cranial nerve deficit.   Skin: Skin is warm and dry.   Psychiatric: She has a normal mood and affect.       Laboratory Data:  No visits with results within 1 Month(s) from this visit.   Latest known visit with results is:   Lab Visit on 03/15/2018   Component Date " Value Ref Range Status    Sodium 03/15/2018 138  136 - 145 mmol/L Final    Potassium 03/15/2018 4.5  3.5 - 5.1 mmol/L Final    Chloride 03/15/2018 107  95 - 110 mmol/L Final    CO2 03/15/2018 25  23 - 29 mmol/L Final    Glucose 03/15/2018 92  70 - 110 mg/dL Final    BUN, Bld 03/15/2018 28* 8 - 23 mg/dL Final    Creatinine 03/15/2018 1.6* 0.5 - 1.4 mg/dL Final    Calcium 03/15/2018 9.5  8.7 - 10.5 mg/dL Final    Total Protein 03/15/2018 7.0  6.0 - 8.4 g/dL Final    Albumin 03/15/2018 3.6  3.5 - 5.2 g/dL Final    Total Bilirubin 03/15/2018 0.8  0.1 - 1.0 mg/dL Final    Comment: For infants and newborns, interpretation of results should be based  on gestational age, weight and in agreement with clinical  observations.  Premature Infant recommended reference ranges:  Up to 24 hours.............<8.0 mg/dL  Up to 48 hours............<12.0 mg/dL  3-5 days..................<15.0 mg/dL  6-29 days.................<15.0 mg/dL      Alkaline Phosphatase 03/15/2018 140* 55 - 135 U/L Final    AST 03/15/2018 38  10 - 40 U/L Final    ALT 03/15/2018 27  10 - 44 U/L Final    Anion Gap 03/15/2018 6* 8 - 16 mmol/L Final    eGFR if  03/15/2018 38.7* >60 mL/min/1.73 m^2 Final    eGFR if non African American 03/15/2018 33.6* >60 mL/min/1.73 m^2 Final    Comment: Calculation used to obtain the estimated glomerular filtration  rate (eGFR) is the CKD-EPI equation.       Hemoglobin A1C 03/15/2018 5.9  4.5 - 6.2 % Final    Comment: According to ADA guidelines, hemoglobin A1C <7.0% represents  optimal control in non-pregnant diabetic patients.  Different  metrics may apply to specific populations.   Standards of Medical Care in Diabetes - 2016.  For the purpose of screening for the presence of diabetes:  <5.7%     Consistent with the absence of diabetes  5.7-6.4%  Consistent with increasing risk for diabetes   (prediabetes)  >or=6.5%  Consistent with diabetes  Currently no consensus exists for use of hemoglobin  A1C  for diagnosis of diabetes for children.      Estimated Avg Glucose 03/15/2018 123  68 - 131 mg/dL Final       Supplemental Diagnosis  Supplemental (2):  This case was sent to Lela Chopra MD at [facility] for consultation ([id number]). Her  consultation diagnosis is as follows:  This addendum is issued for the results of ancillary studies.  All immunostains performed and interpreted at [facility].  KI67: Positive, 5%, consistent with intermediate grade tumor. Please correlate with clinical and imaging findings for  primary tumor site.  The stain controls reacted appropriately.  Supplemental (1):  This case was sent to Lela Chopra MD at [facility] for consultation ([id number]). Her  consultation diagnosis is as follows:  OUTSIDE SLIDE REVIEW  ORIGINAL BIOPSY DATE: 7/19/2017  LIVER MASS (NEEDLE BIOPSY):  -Positive for metastatic well-differentiated neuroendocrine tumor  -No necrosis  -No significant mitotic activity  -Pending Ki-67  -Stains in microscopic section  (Electronically Signed: 2017-07-24 10:18:12 )  Diagnosed by: Dakota Javier II M.D.  FINAL PATHOLOGIC DIAGNOSIS  Liver, biopsy:  -This case is being seen in consultation at [facility]. The consultant's opinion will be reported in  an addendum.      Imaging:     MRI 4/20/1718  EXAMINATION:  MRI ABDOMEN W WO CONTRAST    CLINICAL HISTORY:  Neuroendocrine carcinoma, unknown primary site; Other malignant neuroendocrine tumors    TECHNIQUE:  A series of coronal and axial imaging sequences were obtained of the abdomen before and after the administration of intravenous contrast.    COMPARISON:  This examination was correlated with a prior MRI of the abdomen from November 24, 2017.    FINDINGS:  The visualized portions of the lung bases are clear.  There are no pleural effusions.    There are multifocal areas of abnormal signal intensity throughout the liver suggestive of neoplastic disease.  The largest lesion is noted  in the inferior aspect of the right lobe and measures at least 11.4 x 7.9 x 10.6 cm in greatest dimension.  There is sludge in the dependent portion of the gallbladder.  The spleen, pancreas and adrenal glands are unremarkable.  There is a small cyst in the upper pole of the left kidney posteriorly.  The right kidney is normal in signal intensity.  The abdominal aorta is normal in caliber.  There is a trace amount of ascites adjacent to the liver.  There are no definite enlarged lymph nodes in the upper abdomen.   Impression       Multifocal lesions throughout the liver suggestive of neoplastic disease and consistent with the patient's history of a neuroendocrine tumor.    Sludge in the dependent portion of the gallbladder.    Left renal cyst.                  Assessment:       1. Neuroendocrine carcinoma, unknown primary site    2. Secondary neuroendocrine tumor of liver    3. Malignant neoplasm metastatic to lung, unspecified laterality           Plan:     I've had a long conversation with Mrs. Ames and family members today concerning further treatment options.  We have discussed Afinitor but she does not want to take this or other chemotherapy and therefore CAPTEM would also not be preferred.  She is negative on her octreotide scan and would not be a candidate for Nancy-177.  At this point, I'm not sure if radioembolization would be beneficial but will discuss her case in our multidisciplinary neuroendocrine tumor board.  Additionally, I have discussed the role of I-131 MIBG and we'll check an I-123 MIBG scan at this time.  We will notify her following tumor board recommendations.  We have also extensively discussed the role of hospice which would be appropriate if she desires no further treatment.  Multiple questions were answered and she is agreeable with this plan.    Doyle Dorantes DO, FACP  Hematology & Oncology, [facility]\"" [facility]  [address]  [place]  [address]  ph.  [phone]; [phone]  fax. [phone]    40 minutes were spent in coordination of patient's care, record review and counseling.  More than 50% of the time was face-to-face.       Include Location In Plan?: No Detail Level: Detailed